# Patient Record
Sex: MALE | Race: WHITE
[De-identification: names, ages, dates, MRNs, and addresses within clinical notes are randomized per-mention and may not be internally consistent; named-entity substitution may affect disease eponyms.]

---

## 2022-03-25 ENCOUNTER — HOSPITAL ENCOUNTER (OUTPATIENT)
Dept: HOSPITAL 22 - ER | Age: 57
Setting detail: OBSERVATION
Discharge: HOME | End: 2022-03-25
Payer: COMMERCIAL

## 2022-03-25 VITALS
DIASTOLIC BLOOD PRESSURE: 99 MMHG | HEART RATE: 86 BPM | RESPIRATION RATE: 16 BRPM | OXYGEN SATURATION: 98 % | TEMPERATURE: 98.3 F | SYSTOLIC BLOOD PRESSURE: 134 MMHG

## 2022-03-25 VITALS
SYSTOLIC BLOOD PRESSURE: 123 MMHG | RESPIRATION RATE: 18 BRPM | OXYGEN SATURATION: 97 % | DIASTOLIC BLOOD PRESSURE: 91 MMHG | HEART RATE: 88 BPM

## 2022-03-25 VITALS
RESPIRATION RATE: 18 BRPM | SYSTOLIC BLOOD PRESSURE: 120 MMHG | DIASTOLIC BLOOD PRESSURE: 82 MMHG | HEART RATE: 110 BPM | OXYGEN SATURATION: 94 %

## 2022-03-25 VITALS
OXYGEN SATURATION: 95 % | SYSTOLIC BLOOD PRESSURE: 121 MMHG | RESPIRATION RATE: 18 BRPM | DIASTOLIC BLOOD PRESSURE: 86 MMHG | HEART RATE: 86 BPM

## 2022-03-25 VITALS
RESPIRATION RATE: 18 BRPM | HEART RATE: 95 BPM | DIASTOLIC BLOOD PRESSURE: 84 MMHG | OXYGEN SATURATION: 95 % | SYSTOLIC BLOOD PRESSURE: 142 MMHG

## 2022-03-25 VITALS
DIASTOLIC BLOOD PRESSURE: 80 MMHG | RESPIRATION RATE: 18 BRPM | SYSTOLIC BLOOD PRESSURE: 111 MMHG | HEART RATE: 85 BPM | OXYGEN SATURATION: 96 %

## 2022-03-25 VITALS
OXYGEN SATURATION: 92 % | HEART RATE: 103 BPM | SYSTOLIC BLOOD PRESSURE: 137 MMHG | DIASTOLIC BLOOD PRESSURE: 87 MMHG | RESPIRATION RATE: 18 BRPM

## 2022-03-25 VITALS
HEART RATE: 86 BPM | RESPIRATION RATE: 16 BRPM | OXYGEN SATURATION: 98 % | BODY MASS INDEX: 57.3 KG/M2 | DIASTOLIC BLOOD PRESSURE: 99 MMHG | SYSTOLIC BLOOD PRESSURE: 134 MMHG | BODY MASS INDEX: 53.8 KG/M2 | TEMPERATURE: 98.24 F

## 2022-03-25 VITALS
RESPIRATION RATE: 18 BRPM | HEART RATE: 92 BPM | SYSTOLIC BLOOD PRESSURE: 130 MMHG | OXYGEN SATURATION: 96 % | DIASTOLIC BLOOD PRESSURE: 86 MMHG

## 2022-03-25 VITALS
OXYGEN SATURATION: 95 % | HEART RATE: 88 BPM | RESPIRATION RATE: 16 BRPM | SYSTOLIC BLOOD PRESSURE: 128 MMHG | DIASTOLIC BLOOD PRESSURE: 81 MMHG

## 2022-03-25 VITALS
SYSTOLIC BLOOD PRESSURE: 129 MMHG | OXYGEN SATURATION: 95 % | RESPIRATION RATE: 16 BRPM | HEART RATE: 93 BPM | DIASTOLIC BLOOD PRESSURE: 84 MMHG

## 2022-03-25 VITALS
OXYGEN SATURATION: 97 % | SYSTOLIC BLOOD PRESSURE: 125 MMHG | DIASTOLIC BLOOD PRESSURE: 85 MMHG | HEART RATE: 86 BPM | RESPIRATION RATE: 16 BRPM

## 2022-03-25 VITALS — HEART RATE: 90 BPM

## 2022-03-25 VITALS
SYSTOLIC BLOOD PRESSURE: 124 MMHG | HEART RATE: 96 BPM | RESPIRATION RATE: 18 BRPM | DIASTOLIC BLOOD PRESSURE: 80 MMHG | OXYGEN SATURATION: 95 %

## 2022-03-25 DIAGNOSIS — I25.110: Primary | ICD-10-CM

## 2022-03-25 DIAGNOSIS — E78.5: ICD-10-CM

## 2022-03-25 DIAGNOSIS — Z79.4: ICD-10-CM

## 2022-03-25 DIAGNOSIS — I10: ICD-10-CM

## 2022-03-25 DIAGNOSIS — Z20.822: ICD-10-CM

## 2022-03-25 DIAGNOSIS — R07.9: ICD-10-CM

## 2022-03-25 DIAGNOSIS — R94.31: ICD-10-CM

## 2022-03-25 DIAGNOSIS — Z79.899: ICD-10-CM

## 2022-03-25 DIAGNOSIS — E11.9: ICD-10-CM

## 2022-03-25 LAB
ANION GAP SERPL CALC-SCNC: 10.9 MEQ/L (ref 5–15)
BUN SERPL-MCNC: 14 MG/DL (ref 9–20)
CALCIUM SPEC-MCNC: 9.7 MG/DL (ref 8.4–10.2)
CHLORIDE SPEC-SCNC: 102 MMOL/L (ref 98–107)
CO2 SERPL-SCNC: 28 MMOL/L (ref 22–30)
CREAT BLD-SCNC: 0.8 MG/DL (ref 0.66–1.25)
CREATININE CLEARANCE ESTIMATED: 102 ML/MIN (ref 50–200)
ESTIMATED GLOMERULAR FILT RATE: 101 ML/MIN (ref 60–?)
GFR (AFRICAN AMERICAN): 122 ML/MIN (ref 60–?)
GLUCOSE: 97 MG/DL (ref 74–100)
HCT VFR BLD CALC: 44.9 % (ref 42–52)
HGB BLD-MCNC: 14.2 G/DL (ref 14.1–18)
MCHC RBC-ENTMCNC: 31.5 G/DL (ref 31.8–35.4)
MCV RBC: 82 FL (ref 80–94)
MEAN CORPUSCULAR HEMOGLOBIN: 25.9 PG (ref 27–31.2)
PLATELET # BLD: 270 K/MM3 (ref 142–424)
POTASSIUM: 3.9 MMOL/L (ref 3.5–5.1)
RBC # BLD AUTO: 5.48 M/MM3 (ref 4.6–6.2)
SODIUM SPEC-SCNC: 137 MMOL/L (ref 136–145)
TROPONIN I: < 0.01 NG/ML (ref 0–0.03)
WBC # BLD AUTO: 5.1 K/MM3 (ref 4.8–10.8)

## 2022-03-25 PROCEDURE — 71275 CT ANGIOGRAPHY CHEST: CPT

## 2022-03-25 PROCEDURE — C1725 CATH, TRANSLUMIN NON-LASER: HCPCS

## 2022-03-25 PROCEDURE — C1769 GUIDE WIRE: HCPCS

## 2022-03-25 PROCEDURE — 93458 L HRT ARTERY/VENTRICLE ANGIO: CPT

## 2022-03-25 PROCEDURE — C9803 HOPD COVID-19 SPEC COLLECT: HCPCS

## 2022-03-25 PROCEDURE — C1760 CLOSURE DEV, VASC: HCPCS

## 2022-03-25 PROCEDURE — 99285 EMERGENCY DEPT VISIT HI MDM: CPT

## 2022-03-25 PROCEDURE — G0378 HOSPITAL OBSERVATION PER HR: HCPCS

## 2022-03-25 PROCEDURE — U0003 INFECTIOUS AGENT DETECTION BY NUCLEIC ACID (DNA OR RNA); SEVERE ACUTE RESPIRATORY SYNDROME CORONAVIRUS 2 (SARS-COV-2) (CORONAVIRUS DISEASE [COVID-19]), AMPLIFIED PROBE TECHNIQUE, MAKING USE OF HIGH THROUGHPUT TECHNOLOGIES AS DESCRIBED BY CMS-2020-01-R: HCPCS

## 2022-03-25 PROCEDURE — 80048 BASIC METABOLIC PNL TOTAL CA: CPT

## 2022-03-25 PROCEDURE — U0005 INFEC AGEN DETEC AMPLI PROBE: HCPCS

## 2022-03-25 PROCEDURE — 99152 MOD SED SAME PHYS/QHP 5/>YRS: CPT

## 2022-03-25 PROCEDURE — 85025 COMPLETE CBC W/AUTO DIFF WBC: CPT

## 2022-03-25 PROCEDURE — 84484 ASSAY OF TROPONIN QUANT: CPT

## 2022-03-25 NOTE — HMH.PHAINT
I verified the patients home medication list utilizing list from pharmacy and information provided by the patient.

## 2022-03-28 NOTE — CARE MANAGER
Called and spoke with Dr. Olivera about post discharge status. Patient states that he is doing well, back to work with no issues. He also stated that he was very pleased with the care he received at this facility. No known issues at this time.

## 2022-05-26 ENCOUNTER — DOCUMENTATION (OUTPATIENT)
Dept: BARIATRICS/WEIGHT MGMT | Facility: CLINIC | Age: 57
End: 2022-05-26

## 2022-05-26 ENCOUNTER — OFFICE VISIT (OUTPATIENT)
Dept: BEHAVIORAL HEALTH | Facility: CLINIC | Age: 57
End: 2022-05-26

## 2022-05-26 ENCOUNTER — OFFICE VISIT (OUTPATIENT)
Dept: BARIATRICS/WEIGHT MGMT | Facility: CLINIC | Age: 57
End: 2022-05-26

## 2022-05-26 VITALS
BODY MASS INDEX: 46.65 KG/M2 | TEMPERATURE: 97.9 F | WEIGHT: 315 LBS | DIASTOLIC BLOOD PRESSURE: 86 MMHG | HEIGHT: 69 IN | SYSTOLIC BLOOD PRESSURE: 130 MMHG | HEART RATE: 90 BPM | OXYGEN SATURATION: 98 %

## 2022-05-26 DIAGNOSIS — I10 ESSENTIAL HYPERTENSION: ICD-10-CM

## 2022-05-26 DIAGNOSIS — Z71.89 ENCOUNTER FOR PSYCHOLOGICAL ASSESSMENT PRIOR TO BARIATRIC SURGERY: ICD-10-CM

## 2022-05-26 DIAGNOSIS — Z79.4 TYPE 2 DIABETES MELLITUS WITHOUT COMPLICATION, WITH LONG-TERM CURRENT USE OF INSULIN: ICD-10-CM

## 2022-05-26 DIAGNOSIS — E11.9 TYPE 2 DIABETES MELLITUS WITHOUT COMPLICATION, WITH LONG-TERM CURRENT USE OF INSULIN: ICD-10-CM

## 2022-05-26 DIAGNOSIS — Z56.6 STRESSFUL WORKPLACE: ICD-10-CM

## 2022-05-26 DIAGNOSIS — J45.909 ASTHMA, UNSPECIFIED ASTHMA SEVERITY, UNSPECIFIED WHETHER COMPLICATED, UNSPECIFIED WHETHER PERSISTENT: ICD-10-CM

## 2022-05-26 DIAGNOSIS — K44.9 HIATAL HERNIA: ICD-10-CM

## 2022-05-26 DIAGNOSIS — E78.49 OTHER HYPERLIPIDEMIA: ICD-10-CM

## 2022-05-26 DIAGNOSIS — R12 HEARTBURN: ICD-10-CM

## 2022-05-26 DIAGNOSIS — F43.21 SITUATIONAL DEPRESSION: ICD-10-CM

## 2022-05-26 DIAGNOSIS — E66.01 MORBID OBESITY: Primary | ICD-10-CM

## 2022-05-26 PROBLEM — M54.9 BACK PAIN: Status: ACTIVE | Noted: 2022-05-26

## 2022-05-26 PROBLEM — H81.10 BENIGN PAROXYSMAL POSITIONAL VERTIGO: Status: ACTIVE | Noted: 2022-05-26

## 2022-05-26 PROBLEM — G47.33 OSA (OBSTRUCTIVE SLEEP APNEA): Status: ACTIVE | Noted: 2022-05-26

## 2022-05-26 PROCEDURE — 90791 PSYCH DIAGNOSTIC EVALUATION: CPT | Performed by: PSYCHOLOGIST

## 2022-05-26 PROCEDURE — 99204 OFFICE O/P NEW MOD 45 MIN: CPT | Performed by: PHYSICIAN ASSISTANT

## 2022-05-26 RX ORDER — AMLODIPINE BESYLATE 10 MG/1
10 TABLET ORAL
COMMUNITY
Start: 2022-03-15 | End: 2022-10-11 | Stop reason: HOSPADM

## 2022-05-26 RX ORDER — FLASH GLUCOSE SENSOR
KIT MISCELLANEOUS
COMMUNITY
Start: 2022-04-13

## 2022-05-26 RX ORDER — LOSARTAN POTASSIUM AND HYDROCHLOROTHIAZIDE 12.5; 1 MG/1; MG/1
TABLET ORAL
COMMUNITY
End: 2022-10-11 | Stop reason: HOSPADM

## 2022-05-26 RX ORDER — CARVEDILOL 12.5 MG/1
12.5 TABLET ORAL
COMMUNITY
Start: 2022-03-04 | End: 2022-10-11 | Stop reason: HOSPADM

## 2022-05-26 RX ORDER — DULAGLUTIDE 1.5 MG/.5ML
INJECTION, SOLUTION SUBCUTANEOUS
COMMUNITY
Start: 2022-03-30 | End: 2022-10-11 | Stop reason: HOSPADM

## 2022-05-26 RX ORDER — FLURBIPROFEN SODIUM 0.3 MG/ML
SOLUTION/ DROPS OPHTHALMIC
COMMUNITY
Start: 2022-03-04

## 2022-05-26 RX ORDER — ROSUVASTATIN CALCIUM 10 MG/1
20 TABLET, COATED ORAL
COMMUNITY
Start: 2022-04-05

## 2022-05-26 RX ORDER — INSULIN GLARGINE-YFGN 100 [IU]/ML
INJECTION, SOLUTION SUBCUTANEOUS
COMMUNITY
Start: 2022-03-30 | End: 2022-10-11 | Stop reason: HOSPADM

## 2022-05-26 RX ORDER — INSULIN GLARGINE 100 [IU]/ML
INJECTION, SOLUTION SUBCUTANEOUS EVERY 12 HOURS SCHEDULED
COMMUNITY

## 2022-05-26 RX ORDER — OMEPRAZOLE 20 MG/1
20 TABLET, DELAYED RELEASE ORAL
COMMUNITY
End: 2022-10-11 | Stop reason: HOSPADM

## 2022-05-26 SDOH — HEALTH STABILITY - MENTAL HEALTH: OTHER PHYSICAL AND MENTAL STRAIN RELATED TO WORK: Z56.6

## 2022-05-26 NOTE — PROGRESS NOTES
"[facility] BARIATRIC SURGERY  [address]  [address]  [facility] [address]  [phone]      Patient  Name:  Von Rodríguez  :  1965      Date of Visit: 2022      Chief Complaint:  weight gain; unable to maintain weight loss      History of Present Illness:  Von Rodríguez is a 56 y.o. male who presents today for evaluation, education and consultation regarding metabolic and bariatric surgery with Dr. Connelly.     Von has been overweight for at least 10 years, has been 35 pounds or more overweight for at least 6+ years, has been 100 pounds or more overweight for 6 or more years and started dieting at age 35.  Previous diet attempts include: Body for Life/Bill Martinez, High Protein, Low Carbohydrate, Rubio's Diet, [place] and Fasting; Weight Watchers; Wellbutrin and Ionamin/Adipex.  The most weight Von lost was 80-90 pounds following low carb diet and exercising, but was unable to maintain that weight loss.  His maximum lifetime weight is 380 pounds.  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     Patient was previously undergoing bariatric process at [facility].  He was scheduled for Zonia-en-Y gastric bypass August 3, 2021 with Dr. Jenkins.  The gastric bypass was aborted due to \"high trocar torque \".    GI: He has a history of chronic, episodic heartburn that is treated with daily omeprazole.  He underwent an EGD in May 2021 which did reveal a moderate sized hiatal hernia.  No prior history of H. pylori infection.  He had a laparoscopic cholecystectomy in  for gallstones.    Cardiac/Pulm: Reported history of asthma-not currently using any inhalers.  He also has a history of hypertension.  He underwent LHC 2022 w/ Dr. Berkowitz- no CAD.     Endo: He is a type II diabetic diagnosed in 2018.  He has been on insulin since time of diagnosis.  Most recent A1c 6.2.     Complete history has been obtained and discussed today, as pertinent to " metabolic/ bariatric surgery.     Past Medical History:   Diagnosis Date   • Asthma     childhood; does not have inhaler   • Back pain     chronic; no meds; no steroids   • Benign paroxysmal positional vertigo    • Heartburn     EGD May 2021; no hx of h pylori; symptoms well controlled on omeprazole   • Hypertension    • JAD (obstructive sleep apnea)     s/p uvulaplasty   • Other hyperlipidemia    • S/P cholecystectomy 1993    lap; gallstones   • Type 2 diabetes mellitus without complication, with long-term current use of insulin (HCC)     dx in 2018; on insulin since diagnosis; last A1c 6.2     Past Surgical History:   Procedure Laterality Date   • DIAGNOSTIC LAPAROSCOPY      aborted RNY gastric bypass due to high trocar torque. Dr. Jenkins [facility]   • LAPAROSCOPIC CHOLECYSTECTOMY  1993    gallstones   • TONSILLECTOMY AND ADENOIDECTOMY  2007       Allergies   Allergen Reactions   • Codeine Rash       Current Outpatient Medications:   •  amLODIPine (NORVASC) 10 MG tablet, 10 mg., Disp: , Rfl:   •  B-D UF III MINI PEN NEEDLES 31G X 5 MM misc, , Disp: , Rfl:   •  carvedilol (COREG) 12.5 MG tablet, 12.5 mg., Disp: , Rfl:   •  Continuous Blood Gluc Sensor (FreeStyle Matt 14 Day Sensor) misc, , Disp: , Rfl:   •  Insulin Glargine (Lantus SoloStar) 100 UNIT/ML injection pen, Every 12 (Twelve) Hours., Disp: , Rfl:   •  losartan-hydrochlorothiazide (HYZAAR) 100-12.5 MG per tablet, losartan 100 mg-hydrochlorothiazide 12.5 mg tablet  Take 1 tablet every day by oral route., Disp: , Rfl:   •  metFORMIN (GLUCOPHAGE) 1000 MG tablet, 1,000 mg 2 (Two) Times a Day., Disp: , Rfl:   •  omeprazole OTC (PriLOSEC OTC) 20 MG EC tablet, 20 mg., Disp: , Rfl:   •  rosuvastatin (CRESTOR) 10 MG tablet, 20 mg., Disp: , Rfl:   •  Semglee, yfgn, 100 UNIT/ML solution pen-injector, , Disp: , Rfl:   •  Trulicity 1.5 MG/0.5ML solution pen-injector, , Disp: , Rfl:   •  VITAMIN D PO, Take  by mouth., Disp: , Rfl:   •  Paxlovid 20 x 150 MG & 10 x  100MG tablet therapy pack tablet, TAKE 2 NIRMATRELVIR TABLETS AND 1 RITONAVIR TABLET TOGETHER BY MOUTH TWICE DAILY FOR 5 DAYS, Disp: , Rfl:     Social History     Socioeconomic History   • Marital status:    Tobacco Use   • Smoking status: Never Smoker   • Smokeless tobacco: Never Used   Vaping Use   • Vaping Use: Never used   Substance and Sexual Activity   • Alcohol use: Not Currently   • Drug use: Never     Social History     Social History Narrative    Lives in [place], [place]. 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 .        Family History   Problem Relation Age of Onset   • Hypertension Mother    • Dementia Mother    • Heart disease Father    • Hypertension Father    • Stroke Father    • Hypertension Brother    • Diabetes Brother    • No Known Problems Brother        Review of Systems:  Constitutional:  reports fatigue, weight gain and denies fevers, chills.  HEENT:  denies headache, ear pain or loss of hearing, blurred or double vision, nasal discharge or sore throat.  Cardiovascular:  reports HTN, HLD and denies CAD, Atrial Fib, hx heart disease, heart murmur, hx MI, chest pain, palpitations, edema, hx DVT.  Respiratory:  reports sleep apnea, asthma and denies dyspnea on exertion, shortness of breath , cough , wheezing, COPD, hx PE.  Gastrointestinal:  reports heartburn, gallbladder issues and denies dysphagia, nausea, vomiting, abdominal pain, IBS, diarrhea, constipation, melena, blood in stool, liver disease, hx pancreatitis.  Genitourinary:  reports none and denies history of  frequent UTI, incontinence, hematuria, dysuria, polyuria, polydipsia, renal insufficiency, renal failure.    Musculoskeletal:  reports back pain and denies joint pain, fibromyalgia, arthritis and autoimmune disease.  Neurological:  reports dizziness and denies headaches, migraines, numbness /tingling, confusion, seizure, stroke.  Psychiatric:  reports none and denies depressed mood, hx depression, feeling anxious, hx  anxiety, bipolar disorder, suicidal ideation, hx suicide attempt, hx self injury, hx substance abuse, eating disorder.  Endocrine:  reports diabetes and denies glucose intolerance, thyroid disease, gout.  Hematologic:  reports none and denies bruising, bleeding disorder, hx anemia, hx blood transfusion.  Skin:  reports none and denies rashes, hx MRSA.      COVID-19 Questionnaire:    1.  Have you previously been tested for COVID-19?    []  No  [x]  Yes  2.  Were you ever positive for COVID-19?    [x]  No  []  Yes  3.  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    []  No  [x]  Yes  4.  Are you symptomatic for COVID-19 as defined by the CDC (fever, cough)?  If so, when did symptoms begin?    [x]  No  []  Yes, symptoms began **  5.  Have you been hospitalized for COVID-19?  If so, were you in the ICU?  [x]  No  []  Yes, but not in the ICU  []  Yes, and I was in the ICU  6.  Are you a resident in a congregate (group care setting?)    [x]  No  []  Yes        Physical Exam:  Vital Signs:  Weight: (!) 171 kg (376 lb)   Body mass index is 56.33 kg/m².  Temp: 97.9 °F (36.6 °C)   Heart Rate: 90   BP: 130/86     Physical Exam  Constitutional:       General: He is not in acute distress.     Appearance: He is obese.   HENT:      Head: Normocephalic and atraumatic.   Cardiovascular:      Rate and Rhythm: Normal rate and regular rhythm.      Heart sounds: Normal heart sounds.   Pulmonary:      Effort: Pulmonary effort is normal.      Breath sounds: Normal breath sounds.   Abdominal:      General: Bowel sounds are normal. There is no distension.      Palpations: Abdomen is soft. There is no mass.      Tenderness: There is no abdominal tenderness.      Comments: Old lap scars   Musculoskeletal:      Cervical back: Normal range of motion and neck supple.   Skin:     General: Skin is warm and dry.   Neurological:      General: No focal deficit present.      Mental Status: He is alert and oriented to person, place, and time.    Psychiatric:         Mood and Affect: Mood normal.         Behavior: Behavior normal.         Patient Active Problem List   Diagnosis   • Asthma   • Hypertension   • Other hyperlipidemia   • JAD (obstructive sleep apnea)   • Heartburn   • S/P cholecystectomy   • Back pain   • Benign paroxysmal positional vertigo   • Type 2 diabetes mellitus without complication, with long-term current use of insulin (HCC)   • Hiatal hernia       Assessment:  56 y.o. male with medically complicated obesity pursuing sleeve gastrectomy.    Metabolic & Bariatric Surgery is deemed medically necessary given the following: BMI has not been calculated during today's encounter.         Plan:  Further evaluation will include: CBC, CMP, Lipids, TSH, HgA1C, H.Pylori serum, EKG, CXR, Pulmonary Function Testing, Gastric Emptying Study and EGD.    EGD March 2021-Dr. Debra Tucker:    Findings: Moderate hiatal hernia.    Additional clearances needed prior to surgery will include: Cardiology.     Patient understands that bariatric surgery is not cosmetic surgery but rather a tool to help make a lifelong commitment to lifestyle changes including diet, exercise and behavior modifications.  The patient has been educated today on those expected postoperative lifestyle changes.  Psychological and Nutritional consultations will be completed prior to surgery.  Instructions on how to access [employer] (an internet based site w/ educational surgical videos) were given to the patient.  Recommended perioperative vitamin supplementation was reviewed.  The importance of avoiding ASA/ NSAIDS/ steroids/ tobacco/nicotine/ hormones/ immunomodulators perioperatively was discussed in detail.  All questions/concerns have been addressed.      Further input to follow pending the above.           FABIANO Lyn      ADDENDUM:     h pylori Iga antibody test positive. Have sent in rx for Prevpac. Patient had EGD 5/2021. Discussed with Dr. Connelly and will not plan to repeat  EGD. Patient very adamant he can not hold his PPI for two weeks in order to retest. Has attempted in past with Pepcid as a substitute and was only able to hold for 3 days.

## 2022-05-26 NOTE — PROGRESS NOTES
PROGRESS NOTE    Data:    Sheriff Rodríguez is a 56 y.o. male who met with the undersigned for a scheduled psychological evaluation from 9:00 - 9:45am.      Clinical Maneuvering/Intervention:      Chief complaint and history of presenting illness/Problems: struggling with obesity for several years. Despite trying different weight loss plans and diets, the pt reported being unsuccessful in losing weight. A psychological evaluation was conducted in order to assess past and current level of functioning. Areas assessed included, but were not limited to: perception of social support, perception of ability to face and deal with challenges in life (positive functioning), anxiety symptoms, depressive symptoms, perspective on beliefs/belief system, coping skills for stress, intelligence level, addiction issues, etc. Therapeutic rapport was established. Interventions conducted today were geared towards assessing the pt's readiness for weight loss surgery and identifying and psychological contraindications for undergoing such a major life change. Social support was deemed strong (specific to weight loss surgery/weight loss in this manner and in a general sense): significant other, daughters, friends, and co-workers. Current psychological struggles were described as low, but included: depression situational to being overweight and irritability at work. 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 The pt endorsed having characteristics of readiness to undergo major life changes inherent in the journey of weight loss surgery. The pt could speak to the detailed process of becoming ready mentally for this major  life change, having 'suffered' enough, and how the pt has started incorporating healthier foods into his diet. The pt expressed gratitude for today's visit.     Past Family and Social History:      History of family mental health problems: brother (schizophrenia)     Psychosocial history: treatment of psychiatric care in the past (N/A), alcohol/substance abuse treatment in the past (N/A) , alcohol/substance abuse problems (N/A), inpatient psychiatric care (N/A).    Mental Status Exam (MSE):  Hygiene:  good  Dress: normal  Attitude:  cooperative and proactive  Motor Activity: normal  Speech: normal  Mood:   determined  Affect:  congruent  Thought Processes: normal  Thought Content:  normal  Suicidal Thoughts:  not endorsed  Homicidal Thoughts:  not endorsed  Crisis Safety Plan: not needed   Hallucinations:  none      Patient's Support Network Includes:  family, friends      Progress toward goal: there is evidence to suggest that he is taking measures to improve the quality of his life including seeking weight loss surgery.       Functional Status: moderate to high      Prognosis: good with weight loss surgery    Evaluation, Diagnoses, and Ability/Capacity to Respond to Treatment:      The pt presented to be struggling with depression situational to obesity (BMI = 56.3, morbid obesity) and irritability at work/work stress. Results of MSE demonstrated a functional status of moderate to high. Strengths: belief in self that he will be successful with weight loss surgery, etc (see detailed list of coping skills above). Needed for growth (CPT code requirement for Weaknesses): weight loss.       From a psychological standpoint, the pt presents as a good candidate for bariatric surgery. He is motivated for the surgery, has showed readiness for the lifestyle change in terms of starting to adjust his eating habits, and seems to have appropriate expectations of how to prepare and how to live after surgery in order to lose  weight successfully.    Treatment Plan:      Short term goals: Start improving his health by following up with his bariatric surgeon in order to receive weight loss surgery as soon as feasible/appropriate and demonstrate success with compliance to adhering to the recommended diet. Long term goals: reach a healthy weight and remittance of depression/work stress via taking control of his health.     Camille Rivas, PhD, LP

## 2022-05-26 NOTE — PROGRESS NOTES
"Weight Loss Surgery  Presurgical Nutrition Assessment     Von Rodríguez  05/26/2022  [id number]  [id number]  1965  male    Surgery desired: Sleeve Gastrectomy    Height: 174 cm (68.4\")  Weight: 171 kg (376 #)  BMI: 56.33    Past Medical History:   Diagnosis Date   • Asthma     childhood; does not have inhaler   • Back pain     chronic; no meds; no steroids   • Benign paroxysmal positional vertigo    • Heartburn     EGD May 2021; no hx of h pylori; symptoms well controlled on omeprazole   • Hypertension    • JAD (obstructive sleep apnea)     s/p uvulaplasty   • Other hyperlipidemia    • S/P cholecystectomy 1993    lap; gallstones   • Type 2 diabetes mellitus without complication, with long-term current use of insulin ([facility])     dx in 2018; on insulin since diagnosis; last A1c 6.2     Past Surgical History:   Procedure Laterality Date   • DIAGNOSTIC LAPAROSCOPY      aborted RNY gastric bypass due to high trocar torque. Dr. Gregory Pradhan [facility]   • LAPAROSCOPIC CHOLECYSTECTOMY  1993    gallstones   • TONSILLECTOMY AND ADENOIDECTOMY  2007     Allergies   Allergen Reactions   • Codeine Rash       Current Outpatient Medications:   •  amLODIPine (NORVASC) 10 MG tablet, 10 mg., Disp: , Rfl:   •  B-D UF III MINI PEN NEEDLES 31G X 5 MM misc, , Disp: , Rfl:   •  carvedilol (COREG) 12.5 MG tablet, 12.5 mg., Disp: , Rfl:   •  Continuous Blood Gluc Sensor (FreeStyle Matt 14 Day Sensor) misc, , Disp: , Rfl:   •  Insulin Glargine (Lantus SoloStar) 100 UNIT/ML injection pen, Every 12 (Twelve) Hours., Disp: , Rfl:   •  losartan-hydrochlorothiazide (HYZAAR) 100-12.5 MG per tablet, losartan 100 mg-hydrochlorothiazide 12.5 mg tablet  Take 1 tablet every day by oral route., Disp: , Rfl:   •  metFORMIN (GLUCOPHAGE) 1000 MG tablet, 1,000 mg 2 (Two) Times a Day., Disp: , Rfl:   •  omeprazole OTC (PriLOSEC OTC) 20 MG EC tablet, 20 mg., Disp: , Rfl:   •  Paxlovid 20 x 150 MG & 10 x 100MG tablet therapy pack tablet, TAKE 2 NIRMATRELVIR " TABLETS AND 1 RITONAVIR TABLET TOGETHER BY MOUTH TWICE DAILY FOR 5 DAYS, Disp: , Rfl:   •  rosuvastatin (CRESTOR) 10 MG tablet, 20 mg., Disp: , Rfl:   •  Semglee, yfgn, 100 UNIT/ML solution pen-injector, , Disp: , Rfl:   •  Trulicity 1.5 MG/0.5ML solution pen-injector, , Disp: , Rfl:   •  VITAMIN D PO, Take  by mouth., Disp: , Rfl:       Nutrition Assessment    Estimated energy needs:  2530 kcal    Estimated calories for weight loss:  1800 kcal    IBW (Pounds):  165 #        Excess body weight (Pounds):  210 #       Nutrition Recall  24 Hour recall: (B) (L) (D) -  Reviewed and discussed with patient, who states he is well aware that the convenience food on which he is currently relying is not conducive at all to reaching his weight loss goals. On the other hand, because of previous programs in which he has been involved (SANDY, et al) he capably articulates the principles of healthy lower carb lower total calorie eating. With eating episodes (B-L-D & snacks) not designated on food history form, intake was recorded as follows, in this order:  2 salami & cheese sandwiches on low carb bread, an orange, strawberries, 1 chicken strip, 2 pieces of cake, 1 diet Mt Dew, 2 Zero sugar Gatorade, 6 chicken nuggets, 6 cheese sticks, burrito, egg McMuffin.  Protein needs are approached.  Diet is high in processed foods, carbohydrate ones especially. Meals are fast convenience food primarily. Diet is lacking a variety of fruits and vegetables. Patient will focus on ingesting adequate protein for successful weight loss in 3 regular balanced meals + 2 to 3 high protein snacks per day.  He will wean himself off of all carbonated beverages.         Exercise  never      Education    Provided information packet re:  Sleeve Gastrectomy  1. Reviewed guidelines for higher protein, limited carbohydrate diet to promote weight loss.  Encouraged patient to incorporate these principles of healthy eating from now until approximately 2 weeks prior  to bariatric surgery date, when an even lower carbohydrate “liver-shrinking” regimen will be followed. (Information sheet re pre-op diet given).  Explained that after recovery from surgery this diet will again be followed to ensure further loss and for weight maintenance.    2. Encouraged patient to choose an acceptable protein supplement powder or shake for post-surgery liquid diet.  Provided product guidelines and examples.    3. Explained importance of goal setting to help in changing eating behaviors that are not conducive to weight loss.  Targeted several on a worksheet which also included spaces for patient to work on issues specific to them.  4. Provided follow-up options for support, including contact information for dietitians here, if desired.  Web-based support information and apps for smart phones and computers given.  Noted that monthly support group is offered at this clinic, and that support is associated with successful weight loss.    Recommend that team proceed with surgery and follow per protocol.      Nutrition Goals   Dietary Guidelines per information packet as described above  Protein goal:  grams per day   Carbohydrate goal:  100-140 grams per day  Eliminate soda, sweet tea, etc.     Exercise Goals  Continue current exercise routine   Add 15-30 minutes of activity per day as tolerated      Cris Hansen, KACEY  05/26/2022  10:48 EDT

## 2022-05-28 LAB
ALBUMIN SERPL-MCNC: 4.5 G/DL (ref 3.5–5.2)
ALBUMIN/GLOB SERPL: 1.4 G/DL
ALP SERPL-CCNC: 57 U/L (ref 39–117)
ALT SERPL-CCNC: 43 U/L (ref 1–41)
AST SERPL-CCNC: 28 U/L (ref 1–40)
BASOPHILS # BLD AUTO: 0.03 10*3/MM3 (ref 0–0.2)
BASOPHILS NFR BLD AUTO: 0.5 % (ref 0–1.5)
BILIRUB SERPL-MCNC: 0.4 MG/DL (ref 0–1.2)
BUN SERPL-MCNC: 17 MG/DL (ref 6–20)
BUN/CREAT SERPL: 18.9 (ref 7–25)
CALCIUM SERPL-MCNC: 10.5 MG/DL (ref 8.6–10.5)
CHLORIDE SERPL-SCNC: 101 MMOL/L (ref 98–107)
CHOLEST SERPL-MCNC: 127 MG/DL (ref 0–200)
CO2 SERPL-SCNC: 20.8 MMOL/L (ref 22–29)
CREAT SERPL-MCNC: 0.9 MG/DL (ref 0.76–1.27)
EGFRCR SERPLBLD CKD-EPI 2021: 101.5 ML/MIN/1.73
EOSINOPHIL # BLD AUTO: 0.33 10*3/MM3 (ref 0–0.4)
EOSINOPHIL NFR BLD AUTO: 5.6 % (ref 0.3–6.2)
ERYTHROCYTE [DISTWIDTH] IN BLOOD BY AUTOMATED COUNT: 14.2 % (ref 12.3–15.4)
GLOBULIN SER CALC-MCNC: 3.2 GM/DL
GLUCOSE SERPL-MCNC: 95 MG/DL (ref 65–99)
H PYLORI IGA SER-ACNC: 20.4 UNITS (ref 0–8.9)
H PYLORI IGG SER IA-ACNC: 0.29 INDEX VALUE (ref 0–0.79)
HBA1C MFR BLD: 6.1 % (ref 4.8–5.6)
HCT VFR BLD AUTO: 44 % (ref 37.5–51)
HDLC SERPL-MCNC: 45 MG/DL (ref 40–60)
HGB BLD-MCNC: 13.9 G/DL (ref 13–17.7)
IMM GRANULOCYTES # BLD AUTO: 0.02 10*3/MM3 (ref 0–0.05)
IMM GRANULOCYTES NFR BLD AUTO: 0.3 % (ref 0–0.5)
LDLC SERPL CALC-MCNC: 58 MG/DL (ref 0–100)
LYMPHOCYTES # BLD AUTO: 2.75 10*3/MM3 (ref 0.7–3.1)
LYMPHOCYTES NFR BLD AUTO: 46.8 % (ref 19.6–45.3)
MCH RBC QN AUTO: 25 PG (ref 26.6–33)
MCHC RBC AUTO-ENTMCNC: 31.6 G/DL (ref 31.5–35.7)
MCV RBC AUTO: 79.1 FL (ref 79–97)
MONOCYTES # BLD AUTO: 0.47 10*3/MM3 (ref 0.1–0.9)
MONOCYTES NFR BLD AUTO: 8 % (ref 5–12)
NEUTROPHILS # BLD AUTO: 2.28 10*3/MM3 (ref 1.7–7)
NEUTROPHILS NFR BLD AUTO: 38.8 % (ref 42.7–76)
NRBC BLD AUTO-RTO: 0 /100 WBC (ref 0–0.2)
PLATELET # BLD AUTO: 268 10*3/MM3 (ref 140–450)
POTASSIUM SERPL-SCNC: 4.8 MMOL/L (ref 3.5–5.2)
PROT SERPL-MCNC: 7.7 G/DL (ref 6–8.5)
RBC # BLD AUTO: 5.56 10*6/MM3 (ref 4.14–5.8)
SODIUM SERPL-SCNC: 141 MMOL/L (ref 136–145)
TRIGL SERPL-MCNC: 139 MG/DL (ref 0–150)
TSH SERPL DL<=0.005 MIU/L-ACNC: 1.74 UIU/ML (ref 0.27–4.2)
VLDLC SERPL CALC-MCNC: 24 MG/DL (ref 5–40)
WBC # BLD AUTO: 5.88 10*3/MM3 (ref 3.4–10.8)

## 2022-06-03 ENCOUNTER — CLINICAL SUPPORT NO REQUIREMENTS (OUTPATIENT)
Dept: PREADMISSION TESTING | Facility: HOSPITAL | Age: 57
End: 2022-06-03

## 2022-06-03 DIAGNOSIS — J45.909 ASTHMA, UNSPECIFIED ASTHMA SEVERITY, UNSPECIFIED WHETHER COMPLICATED, UNSPECIFIED WHETHER PERSISTENT: ICD-10-CM

## 2022-06-03 LAB — SARS-COV-2 RNA PNL SPEC NAA+PROBE: NOT DETECTED

## 2022-06-03 PROCEDURE — U0004 COV-19 TEST NON-CDC HGH THRU: HCPCS

## 2022-06-03 PROCEDURE — C9803 HOPD COVID-19 SPEC COLLECT: HCPCS

## 2022-06-06 ENCOUNTER — HOSPITAL ENCOUNTER (OUTPATIENT)
Dept: PULMONOLOGY | Facility: HOSPITAL | Age: 57
Discharge: HOME | End: 2022-06-06
Admitting: PHYSICIAN ASSISTANT

## 2022-06-06 DIAGNOSIS — J45.909 ASTHMA, UNSPECIFIED ASTHMA SEVERITY, UNSPECIFIED WHETHER COMPLICATED, UNSPECIFIED WHETHER PERSISTENT: ICD-10-CM

## 2022-06-06 PROCEDURE — 94727 GAS DIL/WSHOT DETER LNG VOL: CPT

## 2022-06-06 PROCEDURE — 94060 EVALUATION OF WHEEZING: CPT

## 2022-06-06 PROCEDURE — 94727 GAS DIL/WSHOT DETER LNG VOL: CPT | Performed by: INTERNAL MEDICINE

## 2022-06-06 PROCEDURE — 94060 EVALUATION OF WHEEZING: CPT | Performed by: INTERNAL MEDICINE

## 2022-06-06 PROCEDURE — 94729 DIFFUSING CAPACITY: CPT | Performed by: INTERNAL MEDICINE

## 2022-06-06 PROCEDURE — 94729 DIFFUSING CAPACITY: CPT

## 2022-06-06 RX ORDER — ALBUTEROL SULFATE 2.5 MG/3ML
2.5 SOLUTION RESPIRATORY (INHALATION) ONCE
Status: COMPLETED | OUTPATIENT
Start: 2022-06-06 | End: 2022-06-06

## 2022-06-06 RX ADMIN — ALBUTEROL SULFATE 2.5 MG: 2.5 SOLUTION RESPIRATORY (INHALATION) at 09:24

## 2022-06-08 RX ORDER — OMEPRAZOLE 20 MG/1
20 CAPSULE, DELAYED RELEASE ORAL 2 TIMES DAILY
Qty: 28 CAPSULE | Refills: 0 | Status: SHIPPED | OUTPATIENT
Start: 2022-06-08 | End: 2022-06-22

## 2022-06-08 RX ORDER — CLARITHROMYCIN 500 MG/1
500 TABLET, COATED ORAL 2 TIMES DAILY
Qty: 28 TABLET | Refills: 0 | Status: SHIPPED | OUTPATIENT
Start: 2022-06-08 | End: 2022-06-22

## 2022-06-08 RX ORDER — AMOXICILLIN 500 MG/1
1000 CAPSULE ORAL 2 TIMES DAILY
Qty: 56 CAPSULE | Refills: 0 | Status: SHIPPED | OUTPATIENT
Start: 2022-06-08 | End: 2022-06-22

## 2022-06-13 ENCOUNTER — HOSPITAL ENCOUNTER (OUTPATIENT)
Dept: NUCLEAR MEDICINE | Facility: HOSPITAL | Age: 57
Discharge: HOME | End: 2022-06-13

## 2022-06-13 DIAGNOSIS — E11.9 TYPE 2 DIABETES MELLITUS WITHOUT COMPLICATION, WITH LONG-TERM CURRENT USE OF INSULIN: ICD-10-CM

## 2022-06-13 DIAGNOSIS — Z79.4 TYPE 2 DIABETES MELLITUS WITHOUT COMPLICATION, WITH LONG-TERM CURRENT USE OF INSULIN: ICD-10-CM

## 2022-06-13 PROCEDURE — 78264 GASTRIC EMPTYING IMG STUDY: CPT

## 2022-06-13 PROCEDURE — A9541 TC99M SULFUR COLLOID: HCPCS | Performed by: PHYSICIAN ASSISTANT

## 2022-06-13 PROCEDURE — 0 TECHNETIUM SULFUR COLLOID: Performed by: PHYSICIAN ASSISTANT

## 2022-06-13 RX ADMIN — TECHNETIUM TC 99M SULFUR COLLOID 1 DOSE: KIT at 13:25

## 2022-07-25 DIAGNOSIS — R53.83 FATIGUE, UNSPECIFIED TYPE: ICD-10-CM

## 2022-07-25 DIAGNOSIS — R06.00 DYSPNEA, UNSPECIFIED TYPE: Primary | ICD-10-CM

## 2022-08-15 ENCOUNTER — LAB (OUTPATIENT)
Dept: LAB | Facility: HOSPITAL | Age: 57
End: 2022-08-15

## 2022-08-15 ENCOUNTER — HOSPITAL ENCOUNTER (OUTPATIENT)
Dept: GENERAL RADIOLOGY | Facility: HOSPITAL | Age: 57
Discharge: HOME | End: 2022-08-15

## 2022-08-15 DIAGNOSIS — R06.00 DYSPNEA, UNSPECIFIED TYPE: ICD-10-CM

## 2022-08-15 DIAGNOSIS — Z01.818 PRE-OP TESTING: Primary | ICD-10-CM

## 2022-08-15 DIAGNOSIS — R53.83 FATIGUE, UNSPECIFIED TYPE: ICD-10-CM

## 2022-08-15 LAB
ALBUMIN SERPL-MCNC: 4.1 G/DL (ref 3.5–5.2)
ALBUMIN/GLOB SERPL: 1.6 G/DL
ALP SERPL-CCNC: 50 U/L (ref 39–117)
ALT SERPL W P-5'-P-CCNC: 38 U/L (ref 1–41)
ANION GAP SERPL CALCULATED.3IONS-SCNC: 11.3 MMOL/L (ref 5–15)
AST SERPL-CCNC: 25 U/L (ref 1–40)
BILIRUB SERPL-MCNC: 0.3 MG/DL (ref 0–1.2)
BUN SERPL-MCNC: 19 MG/DL (ref 6–20)
BUN/CREAT SERPL: 17.9 (ref 7–25)
CALCIUM SPEC-SCNC: 10 MG/DL (ref 8.6–10.5)
CHLORIDE SERPL-SCNC: 102 MMOL/L (ref 98–107)
CO2 SERPL-SCNC: 28.7 MMOL/L (ref 22–29)
CREAT SERPL-MCNC: 1.06 MG/DL (ref 0.76–1.27)
DEPRECATED RDW RBC AUTO: 38.7 FL (ref 37–54)
EGFRCR SERPLBLD CKD-EPI 2021: 83.4 ML/MIN/1.73
ERYTHROCYTE [DISTWIDTH] IN BLOOD BY AUTOMATED COUNT: 14.2 % (ref 12.3–15.4)
GLOBULIN UR ELPH-MCNC: 2.5 GM/DL
GLUCOSE SERPL-MCNC: 99 MG/DL (ref 65–99)
HCT VFR BLD AUTO: 43.6 % (ref 37.5–51)
HGB BLD-MCNC: 14.2 G/DL (ref 13–17.7)
MCH RBC QN AUTO: 25.2 PG (ref 26.6–33)
MCHC RBC AUTO-ENTMCNC: 32.6 G/DL (ref 31.5–35.7)
MCV RBC AUTO: 77.4 FL (ref 79–97)
PLATELET # BLD AUTO: 230 10*3/MM3 (ref 140–450)
PMV BLD AUTO: 9.6 FL (ref 6–12)
POTASSIUM SERPL-SCNC: 4.6 MMOL/L (ref 3.5–5.2)
PROT SERPL-MCNC: 6.6 G/DL (ref 6–8.5)
QT INTERVAL: 348 MS
QTC INTERVAL: 408 MS
RBC # BLD AUTO: 5.63 10*6/MM3 (ref 4.14–5.8)
SODIUM SERPL-SCNC: 142 MMOL/L (ref 136–145)
WBC NRBC COR # BLD: 5.07 10*3/MM3 (ref 3.4–10.8)

## 2022-08-15 PROCEDURE — 71046 X-RAY EXAM CHEST 2 VIEWS: CPT

## 2022-08-15 PROCEDURE — 93010 ELECTROCARDIOGRAM REPORT: CPT | Performed by: INTERNAL MEDICINE

## 2022-08-15 PROCEDURE — 85027 COMPLETE CBC AUTOMATED: CPT

## 2022-08-15 PROCEDURE — 36415 COLL VENOUS BLD VENIPUNCTURE: CPT

## 2022-08-15 PROCEDURE — 80053 COMPREHEN METABOLIC PANEL: CPT

## 2022-08-15 PROCEDURE — 93005 ELECTROCARDIOGRAM TRACING: CPT

## 2022-09-06 ENCOUNTER — CONSULT (OUTPATIENT)
Dept: BARIATRICS/WEIGHT MGMT | Facility: CLINIC | Age: 57
End: 2022-09-06

## 2022-09-06 VITALS
HEART RATE: 110 BPM | BODY MASS INDEX: 46.65 KG/M2 | TEMPERATURE: 98.2 F | DIASTOLIC BLOOD PRESSURE: 82 MMHG | RESPIRATION RATE: 18 BRPM | SYSTOLIC BLOOD PRESSURE: 118 MMHG | WEIGHT: 315 LBS | HEIGHT: 69 IN | OXYGEN SATURATION: 97 %

## 2022-09-06 DIAGNOSIS — K44.9 HIATAL HERNIA: ICD-10-CM

## 2022-09-06 PROBLEM — E66.01 MORBID OBESITY WITH BODY MASS INDEX (BMI) OF 50.0 TO 59.9 IN ADULT: Status: ACTIVE | Noted: 2022-09-06

## 2022-09-06 PROCEDURE — 99214 OFFICE O/P EST MOD 30 MIN: CPT | Performed by: SURGERY

## 2022-09-06 RX ORDER — SCOLOPAMINE TRANSDERMAL SYSTEM 1 MG/1
1 PATCH, EXTENDED RELEASE TRANSDERMAL ONCE
Status: CANCELLED | OUTPATIENT
Start: 2022-09-06 | End: 2022-09-06

## 2022-09-06 RX ORDER — SODIUM CHLORIDE 0.9 % (FLUSH) 0.9 %
3-10 SYRINGE (ML) INJECTION AS NEEDED
Status: CANCELLED | OUTPATIENT
Start: 2022-09-06

## 2022-09-06 RX ORDER — SODIUM CHLORIDE 0.9 % (FLUSH) 0.9 %
3 SYRINGE (ML) INJECTION EVERY 12 HOURS SCHEDULED
Status: CANCELLED | OUTPATIENT
Start: 2022-09-06

## 2022-09-06 RX ORDER — CHLORHEXIDINE GLUCONATE 0.12 MG/ML
30 RINSE ORAL
Status: CANCELLED | OUTPATIENT
Start: 2022-09-06 | End: 2022-09-06

## 2022-09-06 RX ORDER — ENOXAPARIN SODIUM 150 MG/ML
40 INJECTION SUBCUTANEOUS ONCE
Status: CANCELLED | OUTPATIENT
Start: 2022-09-06 | End: 2022-09-06

## 2022-09-06 RX ORDER — INSULIN LISPRO 200 [IU]/ML
INJECTION, SOLUTION SUBCUTANEOUS
COMMUNITY
Start: 2022-06-25

## 2022-09-06 RX ORDER — ACETAMINOPHEN 500 MG
1000 TABLET ORAL ONCE
Status: CANCELLED | OUTPATIENT
Start: 2022-09-06 | End: 2022-09-06

## 2022-09-06 RX ORDER — GABAPENTIN 100 MG/1
600 CAPSULE ORAL ONCE
Status: CANCELLED | OUTPATIENT
Start: 2022-09-06 | End: 2022-09-06

## 2022-09-06 RX ORDER — PANTOPRAZOLE SODIUM 40 MG/10ML
40 INJECTION, POWDER, LYOPHILIZED, FOR SOLUTION INTRAVENOUS ONCE
Status: CANCELLED | OUTPATIENT
Start: 2022-09-06 | End: 2022-09-06

## 2022-09-06 RX ORDER — FLUCONAZOLE 150 MG/1
150 TABLET ORAL DAILY
COMMUNITY
Start: 2022-08-30 | End: 2022-10-11 | Stop reason: HOSPADM

## 2022-09-06 RX ORDER — SODIUM CHLORIDE 9 MG/ML
150 INJECTION, SOLUTION INTRAVENOUS CONTINUOUS
Status: CANCELLED | OUTPATIENT
Start: 2022-09-06

## 2022-09-06 RX ORDER — LANSOPRAZOLE 30 MG/1
CAPSULE, DELAYED RELEASE ORAL
COMMUNITY
Start: 2022-06-10 | End: 2022-09-06

## 2022-09-06 NOTE — PROGRESS NOTES
[facility] GROUP BARIATRIC SURGERY  [address]  [address]  [facility] [address]  [phone]      Patient  Name:  Von Rodríguez  :  1965      Date of Visit: 22    Chief Complaint:  weight gain; unable to maintain weight loss.   Evaluate for possible metabolic and bariatric surgery    History of Present Illness:  Von Rodríguez is a 56 y.o. male who presents today for evaluation, education and consultation regarding metabolic and bariatric surgery (MBS).  Since last seen 2022 she has gained 5 pounds.  The patient returns for final visit prior to metabolic and bariatric surgery specifically the sleeve gastrectomy.  Original intake evaluation Mayi Aguilar PA-C dated 2022 reviewed.    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  At the patient underwent the bariatric process at [facility] and underwent attempted Zonia-en-Y gastric bypass on 8/3/2021 with Dr. Jenkins and that the procedure was aborted due to high trocar torque.  And that he has history of chronic episodic heartburn treated with daily omeprazole and underwent EGD in May 2021 which revealed a moderate size hiatal hernia and that he had his gallbladder removed for gallstones  and he has history of asthma not currently using inhalers also has a history of hypertension and underwent left heart cath in 2022 with Dr. Berkowitz, no coronary artery disease and that he is a type II diabetic diagnosed in  and has been on insulin since the time of his diagnosis with a most recent A1c of 6.2.      The patient has had issues with morbid obesity for years and only temporary success with non-surgical methods of weight loss.  The patient is seeking LSG to help with the morbid obesity related conditions of abnormal pulmonary function tests, asthma, chronic back pain, fungal intertrigo, elevated ALT level, hiatal hernia  with GE reflux disease, history of H. pylori antibody positive, hypertension, incisional hernia, microcytic erythrocytes, hyperlipidemia, type 2 insulin-dependent diabetes mellitus.    56-year-old morbidly obese male physician from [place].  He is aware at his BMI, at least theoretically, sleeve gastrectomy may be a first stage procedure.  His tachycardia in the office today is asymptomatic.  He is here today with his significant other female.  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  He says he had intense pain in his right upper abdominal incision after his recent attempt at laparoscopic bypass and then at 3 weeks he heard a pop and was pain-free and now has noted an incisional hernia at that site.  He does not recall what the biopsies for H. pylori at the time of his outlying EGD showed.  He says he did take the Prevpac for H. pylori positivity antibody testing and noticed no difference in his symptoms.  He says he only gets reflux when he overeats and not with particular foods.  No dysphagia.  He said that Dr. Jenkins said that given the technical difficulties he had with the patient's body habitus at surgery, it would be helpful may be to do his sleeve with the robot which he apparently does not offer.  After discussion with the patient that although I frequently use the robotics in my bariatric surgical practice, I do not feel using the robot would be necessary and he is comfortable with this.  He says call him by his first name.  He says they did not discuss post op home anticoagulation after planned RNY gastric bypass but he thinks this is a good idea and is agreeable to post op Eliquis as below.  [facility] could not get a [employer] scale readout.      Past Medical History:   Diagnosis Date   • Abnormal PFT     mild obstruction   • Asthma     childhood; does not have inhaler   • Back pain     chronic; no meds; no steroids   • Benign paroxysmal positional  vertigo    • Candidal intertrigo    • Elevated alanine aminotransferase (ALT) level    • Heartburn     EGD May 2021; no hx of h pylori; symptoms well controlled on omeprazole   • Helicobacter pylori ab+     took prevpak 6/22   • Hypertension    • Incisional hernia     s/p attempted lap RNY RUQ incision   • Microcytic erythrocytes    • JAD (obstructive sleep apnea)     s/p uvulaplasty   • Other hyperlipidemia    • S/P cholecystectomy 1993    lap; gallstones   • Type 2 diabetes mellitus without complication, with long-term current use of insulin (HCC)     dx in 2018; on insulin since diagnosis; last A1c 6.2     Past Surgical History:   Procedure Laterality Date   • DIAGNOSTIC LAPAROSCOPY      aborted RNY gastric bypass due to high trocar torque. Dr. Gregory Pradhan [facility]. Complicated by incisional hernia   • LAPAROSCOPIC CHOLECYSTECTOMY  1993    gallstones   • TONSILLECTOMY AND ADENOIDECTOMY  2007   • UVULOPLASTY      at time tonsils 2007 for JAD       Allergies   Allergen Reactions   • Codeine Rash       Current Outpatient Medications:   •  amLODIPine (NORVASC) 10 MG tablet, 10 mg., Disp: , Rfl:   •  B-D UF III MINI PEN NEEDLES 31G X 5 MM misc, , Disp: , Rfl:   •  carvedilol (COREG) 12.5 MG tablet, 12.5 mg., Disp: , Rfl:   •  Continuous Blood Gluc Sensor (FreeStyle Matt 14 Day Sensor) misc, , Disp: , Rfl:   •  fluconazole (DIFLUCAN) 150 MG tablet, Take 150 mg by mouth Daily., Disp: , Rfl:   •  HumaLOG KwikPen 200 UNIT/ML solution pen-injector, , Disp: , Rfl:   •  Insulin Glargine (Lantus SoloStar) 100 UNIT/ML injection pen, Every 12 (Twelve) Hours., Disp: , Rfl:   •  losartan-hydrochlorothiazide (HYZAAR) 100-12.5 MG per tablet, losartan 100 mg-hydrochlorothiazide 12.5 mg tablet  Take 1 tablet every day by oral route., Disp: , Rfl:   •  metFORMIN (GLUCOPHAGE) 1000 MG tablet, 1,000 mg 2 (Two) Times a Day., Disp: , Rfl:   •  omeprazole OTC (PriLOSEC OTC) 20 MG EC tablet, 20 mg., Disp: , Rfl:   •  rosuvastatin (CRESTOR) 10  MG tablet, 20 mg., Disp: , Rfl:   •  Semglee, yfgn, 100 UNIT/ML solution pen-injector, , Disp: , Rfl:   •  Trulicity 1.5 MG/0.5ML solution pen-injector, , Disp: , Rfl:   •  VITAMIN D PO, Take  by mouth., Disp: , Rfl:   •  apixaban (Eliquis) 2.5 MG tablet tablet, Take 1 tablet by mouth Every 12 (Twelve) Hours for 42 doses., Disp: 42 tablet, Rfl: 0  •  Paxlovid 20 x 150 MG & 10 x 100MG tablet therapy pack tablet, TAKE 2 NIRMATRELVIR TABLETS AND 1 RITONAVIR TABLET TOGETHER BY MOUTH TWICE DAILY FOR 5 DAYS, Disp: , Rfl:     Social History     Socioeconomic History   • Marital status:    Tobacco Use   • Smoking status: Never Smoker   • Smokeless tobacco: Never Used   Vaping Use   • Vaping Use: Never used   Substance and Sexual Activity   • Alcohol use: Not Currently   • Drug use: Never     Family History   Problem Relation Age of Onset   • Hypertension Mother    • Dementia Mother    • Heart disease Father    • Hypertension Father    • Stroke Father    • Hypertension Brother    • Diabetes Brother    • No Known Problems Brother        Review of Systems   Constitutional: Positive for fatigue and unexpected weight gain. Negative for chills, diaphoresis, fever and unexpected weight loss.   HENT: Negative for congestion and facial swelling.    Eyes: Negative for blurred vision, double vision and discharge.   Respiratory: Negative for chest tightness, shortness of breath and stridor.    Cardiovascular: Negative for chest pain, palpitations and leg swelling.   Gastrointestinal: Negative for blood in stool.   Endocrine: Negative for polydipsia.   Genitourinary: Negative for hematuria.   Musculoskeletal: Positive for back pain.   Skin: Negative for color change.   Allergic/Immunologic: Negative for immunocompromised state.   Neurological: Negative for confusion.   Psychiatric/Behavioral: Negative for self-injury.       I have reviewed the ROS and confirm that it's accurate today.    Physical Exam:  Vital Signs:  Weight: (!)  173 kg (381 lb)   Body mass index is 57.09 kg/m².  Temp: 98.2 °F (36.8 °C)   Heart Rate: 110   BP: 118/82     Physical Exam  Vitals reviewed.   Constitutional:       Appearance: He is well-developed.   HENT:      Head: Normocephalic and atraumatic.      Nose:      Comments: mask  Eyes:      Conjunctiva/sclera: Conjunctivae normal.      Pupils: Pupils are equal, round, and reactive to light.   Neck:      Thyroid: No thyromegaly.      Vascular: No carotid bruit.      Trachea: No tracheal deviation.   Cardiovascular:      Rate and Rhythm: Regular rhythm. Tachycardia present.      Heart sounds: Normal heart sounds.   Pulmonary:      Effort: Pulmonary effort is normal. No respiratory distress.      Breath sounds: Normal breath sounds.   Abdominal:      General: There is no distension.      Palpations: Abdomen is soft.      Tenderness: There is no abdominal tenderness.          Comments: Pannus   Musculoskeletal:         General: No deformity. Normal range of motion.      Cervical back: Normal range of motion and neck supple.   Skin:     General: Skin is warm and dry.      Findings: No rash.   Neurological:      Mental Status: He is alert and oriented to person, place, and time.      Cranial Nerves: No cranial nerve deficit.      Coordination: Coordination normal.   Psychiatric:         Behavior: Behavior normal.         Thought Content: Thought content normal.         Judgment: Judgment normal.         Patient Active Problem List   Diagnosis   • Asthma   • Hypertension   • Other hyperlipidemia   • JAD (obstructive sleep apnea)   • Heartburn   • S/P cholecystectomy   • Back pain   • Benign paroxysmal positional vertigo   • Type 2 diabetes mellitus without complication, with long-term current use of insulin ([facility])   • Hiatal hernia   • Morbid obesity with body mass index (BMI) of 50.0 to 59.9 in adult ([facility])       Assessment:    Von Rodríguez is a 56 y.o. year old male with medically complicated obesity.    Metabolic and  bariatric surgery is deemed medically necessary given the following obesity related comorbidities including abnormal pulmonary function tests, asthma, chronic back pain, fungal intertrigo, elevated ALT level, hiatal hernia with GE reflux disease, history of H. pylori antibody positive, hypertension, incisional hernia, microcytic erythrocytes, hyperlipidemia, type 2 insulin-dependent diabetes mellitus with current Weight: (!) 173 kg (381 lb) and Body mass index is 57.09 kg/m²..    Encounter Diagnoses   Name Primary?   • Body mass index (BMI) of 50-59.9 in adult (HCC) Yes   • Hiatal hernia       Patient is aware that surgery is a tool, and that weight loss and improvement in comorbidities is not guaranteed but only seen in the context of appropriate use, follow up and physical activity.    The patient was present for an approximately a 2.5 hour discussion of the purpose of MBS, how MBS is a tool to assist in achieving weight loss goals, the most common complications and how best to avoid them, and the strategies for short and long term weight loss and improvement in comorbidities.  Ample opportunity to discuss questions was available both in group and during the time of individual examination.    I reviewed his Aditya report which is negative.  Labs dated 8/15/2022 normal CMP normal CBC except for microcytic indices of note hemoglobin 14.2 chest x-ray dated 8/15/20.  Pulmonary function tests dated 6/9/2022 showing mild obstruction read by , no response to bronchodilators.  Of note FVC 92 FEV1 78 FEF 25-70 542 and FEF 7528 of note negative numbers after bronchodilators.  DLCO 82.  EKG dated 8/15/2022 normal sinus rhythm with ST elevation probably due to early repolarization confirmed by Dr. Hernández.  Psychosocial evaluation dated 5/26/2022 Camille Rivas, PhD good candidate.  Dietitian evaluation dated 5/26/2022 Cris cesar RD noting that the diet is high in processed foods carbohydrate especially and meals  "are fast convenience food primarily in diet is lacking a variety of fruits and vegetables.  Negative serum H. pylori testing dated 5/28/2022 other labs from that date normal TSH normal lipid panel hemoglobin A1c 6.10 unremarkable CMP except for CO2 of 20.8 and ALT of 43 unremarkable CBC of note hemoglobin 13.9 and low MCH.  Normal gastric emptying study dated 6/13/2022 with 69% emptying at 90 minutes no reflux visualized.  Cardiology clearance dated 6/10/2022 signature illegible noting it is okay to hold antiplatelet agents 1 week prior in 6 weeks after sleeve gastrectomy.  Cardiac catheterization dated 3/25/2022 Dr. Ye Berkowitz noting normal coronary arteries and normal ejection fraction and mildly elevated left ventricular end-diastolic pressure previous psychological evaluation dated 5/10/2021 with [facility] psychology appropriate candidate.  Note dated 5/5/2022 from the patient saying that he attempted to have the gastric bypass at [facility] which cannot be achieved and then he followed up with [place] [facility] \"but their end has been quite suboptimal to see the least.  For reasons I do not know I cannot get a response from them despite numerous emails and phone calls\".  He notes this brings him back to [facility] bariatrics where he originally started and that he had previously filled out an information packet but however the time we were not [facility] certified.  EGD dated 5/10/2021 Debra Arzola MD noting a moderate hiatal hernia.  No pathology available.  Please see scanned records that I have reviewed and signed off on today.  All of this in addition to the patient's unique history and exam has been taken into consideration in determining their appropriate candidacy for MBS.    Complications  of laparoscopic/possible robotic gastric sleeve were discussed. The patient is well aware of the potential complications of surgery that include but not limited to " "bleeding, infections, deep venous thrombosis, pulmonary embolism, pulmonary complications such as pneumonia, cardiac events, hernias, small bowel obstruction, damage to the spleen or other organs, bowel injury, disfiguring scars, failure to lose weight, need for additional surgery, conversion to an open procedure, and death. Patient is also aware of complications which apply in this particular procedure that can include but are not limited to a \"leak\" at the staple line which in some instances may require conversion to gastric bypass.    The patient is aware if a hiatal hernia is encountered, it likely will be repaired.  R/B/A Rx to hiatal hernia repair were discussed as outlined in our long consent form.  Briefly risks in addition to those for LSG include recurrent hernia, FILI, dysphagia, esophageal injury, pneumothorax, injury to the vagus nerves, injury to the thoracic duct, aorta or vena cava.    I discussed avoiding all tobacco products, nicotine,  and second hand smoke at least 2 weeks pre-operatively and 6 weeks post-operatively to minimize the risk of sleeve leak.  This included discussing the importance of avoiding even secondhand smoke as the risk of leak is increased.  Examples discussed:  Avoid going in a house or riding in a car where someone has previously smoked in the last 2 weeks and for 6 weeks postoperatively.  Avoid living in a house where someone smokes (even if it's in a separate room/patio/attached garage, etc.).   Avoid congregating with a group of people who are smoking even if it's outside.  It is OK to be around wood burning fires and barbecue.  I explained that I do not know if marijuana has a same effects but my overall recommendation is to avoid it for 2 weeks prior in 6 weeks after surgery.     Discussed the risks, benefits and alternative therapies at great length as outlined in our extensive consent forms, consent videos, and educational teaching process under the direction of the " center's .    A copy of the patient's signed informed consent is on file.    R/B/A Rx discussed to postop anticoagulation incl but not limited to bleeding, drug reaction, venothromboembolic events, etc. and agreeable to taking post op  Eliquis 2.5 mg po Q 12 hrs #42      Plan: After evaluation today I think the patient is a reasonable candidate for laparoscopic sleeve gastrectomy, hiatal hernia repair, and EGD.  He is not interested in Zonia-en-Y gastric bypass at this time.  Once again given his BMI and poor dietary habits noted on dietitian evaluation sleeve gastrectomy may be a first stage procedure.  This may be technically challenging as previous bariatric surgeon at [facility] could not technically perform the procedure due to the patient's body habitus and what is described as high trocar torque.  Once again nonetheless I suspect this can be done laparoscopically and do not feel at this time that the robot will be required.  We discussed the risks, benefits, and alternative therapies to concomitant ventral incisional hernia repair.  He is interested in concomitant repair.  After discussion he agrees that I would not proceed with repair with mesh (he saw complications with mesh fascial reinforcement with open Zonia-en-Y gastric bypass during his training and agrees).  He also understands that it is unlikely that I would attempt primary repair and if so there would be a high risk of recurrence.  He understands I may have similar technical issues with inability to perform the procedure and nonetheless wishes to proceed.    No primary care provider listed.    Pete Connelly MD              Answers for HPI/ROS submitted by the patient on 8/30/2022  What is the primary reason for your visit?: Other  Please describe your symptoms.: Bariatric surgery preop  Have you had these symptoms before?: No  How long have you been having these symptoms?: Greater than 2 weeks

## 2022-09-26 ENCOUNTER — TELEPHONE (OUTPATIENT)
Dept: BARIATRICS/WEIGHT MGMT | Facility: CLINIC | Age: 57
End: 2022-09-26

## 2022-09-26 NOTE — TELEPHONE ENCOUNTER
Pt messaged that his Eliquis is not covered with insurance. Pt does not have pharmacy benefits so I cannot do a prior authorization. Please advise, thanks!

## 2022-09-28 NOTE — TELEPHONE ENCOUNTER
Pt declined Lovenox injections, will keep trying to get ahold of his insurance and see what I can get done for a PA.

## 2022-09-30 ENCOUNTER — PRE-ADMISSION TESTING (OUTPATIENT)
Dept: PREADMISSION TESTING | Facility: HOSPITAL | Age: 57
End: 2022-09-30

## 2022-09-30 ENCOUNTER — PRIOR AUTHORIZATION (OUTPATIENT)
Dept: BARIATRICS/WEIGHT MGMT | Facility: CLINIC | Age: 57
End: 2022-09-30

## 2022-09-30 DIAGNOSIS — K44.9 HIATAL HERNIA: ICD-10-CM

## 2022-09-30 LAB
ABO GROUP BLD: NORMAL
DEPRECATED RDW RBC AUTO: 43.4 FL (ref 37–54)
ERYTHROCYTE [DISTWIDTH] IN BLOOD BY AUTOMATED COUNT: 15.1 % (ref 12.3–15.4)
HBA1C MFR BLD: 6.2 % (ref 4.8–5.6)
HCT VFR BLD AUTO: 42 % (ref 37.5–51)
HGB BLD-MCNC: 13.4 G/DL (ref 13–17.7)
MCH RBC QN AUTO: 25.3 PG (ref 26.6–33)
MCHC RBC AUTO-ENTMCNC: 31.9 G/DL (ref 31.5–35.7)
MCV RBC AUTO: 79.2 FL (ref 79–97)
PLATELET # BLD AUTO: 215 10*3/MM3 (ref 140–450)
PMV BLD AUTO: 9.3 FL (ref 6–12)
POTASSIUM SERPL-SCNC: 4 MMOL/L (ref 3.5–5.2)
RBC # BLD AUTO: 5.3 10*6/MM3 (ref 4.14–5.8)
RH BLD: POSITIVE
WBC NRBC COR # BLD: 4.56 10*3/MM3 (ref 3.4–10.8)

## 2022-09-30 PROCEDURE — 84132 ASSAY OF SERUM POTASSIUM: CPT

## 2022-09-30 PROCEDURE — 83036 HEMOGLOBIN GLYCOSYLATED A1C: CPT

## 2022-09-30 PROCEDURE — 87081 CULTURE SCREEN ONLY: CPT

## 2022-09-30 PROCEDURE — 36415 COLL VENOUS BLD VENIPUNCTURE: CPT

## 2022-09-30 PROCEDURE — 86901 BLOOD TYPING SEROLOGIC RH(D): CPT

## 2022-09-30 PROCEDURE — 85027 COMPLETE CBC AUTOMATED: CPT

## 2022-09-30 PROCEDURE — 86900 BLOOD TYPING SEROLOGIC ABO: CPT

## 2022-09-30 NOTE — TELEPHONE ENCOUNTER
[facility] ([place]: [id number]) - [phone]  Eliquis 2.5MG tablets       Status: PA Request    Created: September 30th, 2022    Sent: September 30th, 2022

## 2022-10-01 LAB — MRSA SPEC QL CULT: NORMAL

## 2022-10-04 ENCOUNTER — ANESTHESIA EVENT (OUTPATIENT)
Dept: PERIOP | Facility: HOSPITAL | Age: 57
End: 2022-10-04

## 2022-10-04 ENCOUNTER — TELEPHONE (OUTPATIENT)
Dept: BARIATRICS/WEIGHT MGMT | Facility: CLINIC | Age: 57
End: 2022-10-04

## 2022-10-04 RX ORDER — ENOXAPARIN SODIUM 100 MG/ML
40 INJECTION SUBCUTANEOUS
Qty: 8.4 ML | Refills: 0 | Status: SHIPPED | OUTPATIENT
Start: 2022-10-04 | End: 2022-10-11 | Stop reason: HOSPADM

## 2022-10-04 RX ORDER — SODIUM CHLORIDE 0.9 % (FLUSH) 0.9 %
10 SYRINGE (ML) INJECTION AS NEEDED
Status: CANCELLED | OUTPATIENT
Start: 2022-10-04

## 2022-10-04 RX ORDER — SODIUM CHLORIDE 0.9 % (FLUSH) 0.9 %
10 SYRINGE (ML) INJECTION EVERY 12 HOURS SCHEDULED
Status: CANCELLED | OUTPATIENT
Start: 2022-10-04

## 2022-10-04 NOTE — TELEPHONE ENCOUNTER
He was agreeable to the Lovenox injections. Please send them in and he will go pick them up :) Also advised about the gatorade and he understood with no further questions or concerns.

## 2022-10-04 NOTE — TELEPHONE ENCOUNTER
Pts Eliquis was denied twice through prior auth. Pt is persistant on not wanting to do Lovenox injections. Please advise, thanks!

## 2022-10-05 ENCOUNTER — ANESTHESIA (OUTPATIENT)
Dept: PERIOP | Facility: HOSPITAL | Age: 57
End: 2022-10-05

## 2022-10-05 ENCOUNTER — APPOINTMENT (OUTPATIENT)
Dept: GENERAL RADIOLOGY | Facility: HOSPITAL | Age: 57
End: 2022-10-05

## 2022-10-05 ENCOUNTER — HOSPITAL ENCOUNTER (INPATIENT)
Facility: HOSPITAL | Age: 57
LOS: 6 days | Discharge: HOME HEALTH | End: 2022-10-11
Attending: SURGERY | Admitting: SURGERY

## 2022-10-05 ENCOUNTER — ANESTHESIA EVENT CONVERTED (OUTPATIENT)
Dept: ANESTHESIOLOGY | Facility: HOSPITAL | Age: 57
End: 2022-10-05

## 2022-10-05 DIAGNOSIS — E66.01 MORBID OBESITY WITH BODY MASS INDEX (BMI) OF 50.0 TO 59.9 IN ADULT: Primary | ICD-10-CM

## 2022-10-05 DIAGNOSIS — K44.9 HIATAL HERNIA: ICD-10-CM

## 2022-10-05 PROBLEM — K21.9 GERD (GASTROESOPHAGEAL REFLUX DISEASE): Status: ACTIVE | Noted: 2022-05-26

## 2022-10-05 LAB
ABO GROUP BLD: NORMAL
BLD GP AB SCN SERPL QL: NEGATIVE
GLUCOSE BLDC GLUCOMTR-MCNC: 131 MG/DL (ref 70–130)
GLUCOSE BLDC GLUCOMTR-MCNC: 175 MG/DL (ref 70–130)
GLUCOSE BLDC GLUCOMTR-MCNC: 189 MG/DL (ref 70–130)
HCT VFR BLD AUTO: 43.6 % (ref 37.5–51)
HGB BLD-MCNC: 13.5 G/DL (ref 13–17.7)
RH BLD: POSITIVE
T&S EXPIRATION DATE: NORMAL

## 2022-10-05 PROCEDURE — 43843 GSTR RSTCV OTH/THN V-BAND: CPT | Performed by: PHYSICIAN ASSISTANT

## 2022-10-05 PROCEDURE — 25010000002 HYDROMORPHONE HCL-NACL 30-0.9 MG/30ML-% SOLUTION PREFILLED SYRINGE

## 2022-10-05 PROCEDURE — 85014 HEMATOCRIT: CPT | Performed by: SURGERY

## 2022-10-05 PROCEDURE — 25010000002 DEXAMETHASONE PER 1 MG: Performed by: NURSE ANESTHETIST, CERTIFIED REGISTERED

## 2022-10-05 PROCEDURE — 25010000002 AMISULPRIDE (ANTIEMETIC) 10 MG/4ML SOLUTION: Performed by: NURSE ANESTHETIST, CERTIFIED REGISTERED

## 2022-10-05 PROCEDURE — C9399 UNCLASSIFIED DRUGS OR BIOLOG: HCPCS | Performed by: NURSE ANESTHETIST, CERTIFIED REGISTERED

## 2022-10-05 PROCEDURE — 86923 COMPATIBILITY TEST ELECTRIC: CPT

## 2022-10-05 PROCEDURE — 82962 GLUCOSE BLOOD TEST: CPT

## 2022-10-05 PROCEDURE — P9041 ALBUMIN (HUMAN),5%, 50ML: HCPCS | Performed by: NURSE ANESTHETIST, CERTIFIED REGISTERED

## 2022-10-05 PROCEDURE — 25010000002 FENTANYL CITRATE (PF) 50 MCG/ML SOLUTION: Performed by: NURSE ANESTHETIST, CERTIFIED REGISTERED

## 2022-10-05 PROCEDURE — 86900 BLOOD TYPING SEROLOGIC ABO: CPT | Performed by: SURGERY

## 2022-10-05 PROCEDURE — 94799 UNLISTED PULMONARY SVC/PX: CPT

## 2022-10-05 PROCEDURE — 88307 TISSUE EXAM BY PATHOLOGIST: CPT | Performed by: SURGERY

## 2022-10-05 PROCEDURE — 25010000002 HYDROMORPHONE 1 MG/ML SOLUTION

## 2022-10-05 PROCEDURE — 25010000002 DROPERIDOL PER 5 MG

## 2022-10-05 PROCEDURE — 25010000002 PROPOFOL 10 MG/ML EMULSION: Performed by: NURSE ANESTHETIST, CERTIFIED REGISTERED

## 2022-10-05 PROCEDURE — 25010000002 ALBUMIN HUMAN 5% PER 50 ML: Performed by: NURSE ANESTHETIST, CERTIFIED REGISTERED

## 2022-10-05 PROCEDURE — 0DB60Z3 EXCISION OF STOMACH, OPEN APPROACH, VERTICAL: ICD-10-PCS | Performed by: SURGERY

## 2022-10-05 PROCEDURE — 0DJ08ZZ INSPECTION OF UPPER INTESTINAL TRACT, VIA NATURAL OR ARTIFICIAL OPENING ENDOSCOPIC: ICD-10-PCS | Performed by: SURGERY

## 2022-10-05 PROCEDURE — 63710000001 INSULIN LISPRO (HUMAN) PER 5 UNITS: Performed by: SURGERY

## 2022-10-05 PROCEDURE — 25010000002 ONDANSETRON PER 1 MG: Performed by: NURSE ANESTHETIST, CERTIFIED REGISTERED

## 2022-10-05 PROCEDURE — 43843 GSTR RSTCV OTH/THN V-BAND: CPT | Performed by: SURGERY

## 2022-10-05 PROCEDURE — 25010000002 DEXAMETHASONE SODIUM PHOSPHATE 10 MG/ML SOLUTION: Performed by: NURSE ANESTHETIST, CERTIFIED REGISTERED

## 2022-10-05 PROCEDURE — 25010000002 FENTANYL CITRATE (PF) 50 MCG/ML SOLUTION

## 2022-10-05 PROCEDURE — 86901 BLOOD TYPING SEROLOGIC RH(D): CPT | Performed by: SURGERY

## 2022-10-05 PROCEDURE — 99222 1ST HOSP IP/OBS MODERATE 55: CPT | Performed by: PHYSICIAN ASSISTANT

## 2022-10-05 PROCEDURE — 25010000002 GLUCAGON (HUMAN RECOMBINANT) 1 MG RECONSTITUTED SOLUTION: Performed by: NURSE ANESTHETIST, CERTIFIED REGISTERED

## 2022-10-05 PROCEDURE — 86850 RBC ANTIBODY SCREEN: CPT | Performed by: SURGERY

## 2022-10-05 PROCEDURE — 99024 POSTOP FOLLOW-UP VISIT: CPT | Performed by: SURGERY

## 2022-10-05 PROCEDURE — 85018 HEMOGLOBIN: CPT | Performed by: SURGERY

## 2022-10-05 DEVICE — SEALANT WND FIBRIN TISSEEL PREFIL/SYR/PRIMAFZ 10ML: Type: IMPLANTABLE DEVICE | Site: ABDOMEN | Status: FUNCTIONAL

## 2022-10-05 DEVICE — ABSORBABLE WOUND CLOSURE DEVICE
Type: IMPLANTABLE DEVICE | Site: ABDOMEN | Status: FUNCTIONAL
Brand: SYNETURE

## 2022-10-05 RX ORDER — IPRATROPIUM BROMIDE AND ALBUTEROL SULFATE 2.5; .5 MG/3ML; MG/3ML
3 SOLUTION RESPIRATORY (INHALATION) ONCE AS NEEDED
Status: DISCONTINUED | OUTPATIENT
Start: 2022-10-05 | End: 2022-10-05 | Stop reason: HOSPADM

## 2022-10-05 RX ORDER — CHLORHEXIDINE GLUCONATE 0.12 MG/ML
30 RINSE ORAL
Status: COMPLETED | OUTPATIENT
Start: 2022-10-05 | End: 2022-10-05

## 2022-10-05 RX ORDER — SCOLOPAMINE TRANSDERMAL SYSTEM 1 MG/1
1 PATCH, EXTENDED RELEASE TRANSDERMAL ONCE
Status: DISCONTINUED | OUTPATIENT
Start: 2022-10-05 | End: 2022-10-05

## 2022-10-05 RX ORDER — PANTOPRAZOLE SODIUM 40 MG/10ML
40 INJECTION, POWDER, LYOPHILIZED, FOR SOLUTION INTRAVENOUS
Status: DISCONTINUED | OUTPATIENT
Start: 2022-10-06 | End: 2022-10-07

## 2022-10-05 RX ORDER — CARVEDILOL 12.5 MG/1
12.5 TABLET ORAL EVERY 12 HOURS SCHEDULED
Status: DISCONTINUED | OUTPATIENT
Start: 2022-10-05 | End: 2022-10-11 | Stop reason: HOSPADM

## 2022-10-05 RX ORDER — CEFAZOLIN SODIUM IN 0.9 % NACL 3 G/100 ML
3 INTRAVENOUS SOLUTION, PIGGYBACK (ML) INTRAVENOUS ONCE
Status: COMPLETED | OUTPATIENT
Start: 2022-10-05 | End: 2022-10-05

## 2022-10-05 RX ORDER — AMLODIPINE BESYLATE 10 MG/1
10 TABLET ORAL
Status: DISCONTINUED | OUTPATIENT
Start: 2022-10-06 | End: 2022-10-11 | Stop reason: HOSPADM

## 2022-10-05 RX ORDER — MAGNESIUM HYDROXIDE 1200 MG/15ML
LIQUID ORAL AS NEEDED
Status: DISCONTINUED | OUTPATIENT
Start: 2022-10-05 | End: 2022-10-05 | Stop reason: HOSPADM

## 2022-10-05 RX ORDER — NALOXONE HCL 0.4 MG/ML
0.1 VIAL (ML) INJECTION
Status: DISCONTINUED | OUTPATIENT
Start: 2022-10-05 | End: 2022-10-11 | Stop reason: HOSPADM

## 2022-10-05 RX ORDER — ALBUTEROL SULFATE 2.5 MG/3ML
2.5 SOLUTION RESPIRATORY (INHALATION) EVERY 4 HOURS PRN
Status: DISCONTINUED | OUTPATIENT
Start: 2022-10-05 | End: 2022-10-11 | Stop reason: HOSPADM

## 2022-10-05 RX ORDER — CEFAZOLIN SODIUM IN 0.9 % NACL 3 G/100 ML
3 INTRAVENOUS SOLUTION, PIGGYBACK (ML) INTRAVENOUS EVERY 8 HOURS
Status: DISPENSED | OUTPATIENT
Start: 2022-10-05 | End: 2022-10-06

## 2022-10-05 RX ORDER — ACETAMINOPHEN 500 MG
1000 TABLET ORAL ONCE
Status: COMPLETED | OUTPATIENT
Start: 2022-10-05 | End: 2022-10-05

## 2022-10-05 RX ORDER — SODIUM CHLORIDE 9 MG/ML
250 INJECTION, SOLUTION INTRAVENOUS CONTINUOUS
Status: ACTIVE | OUTPATIENT
Start: 2022-10-05 | End: 2022-10-06

## 2022-10-05 RX ORDER — DEXAMETHASONE SODIUM PHOSPHATE 10 MG/ML
INJECTION, SOLUTION INTRAMUSCULAR; INTRAVENOUS
Status: COMPLETED | OUTPATIENT
Start: 2022-10-05 | End: 2022-10-05

## 2022-10-05 RX ORDER — ACETAMINOPHEN 160 MG/5ML
1000 SOLUTION ORAL EVERY 8 HOURS SCHEDULED
Status: ACTIVE | OUTPATIENT
Start: 2022-10-05 | End: 2022-10-07

## 2022-10-05 RX ORDER — OXYCODONE HYDROCHLORIDE 5 MG/1
5 TABLET ORAL EVERY 6 HOURS PRN
Status: DISCONTINUED | OUTPATIENT
Start: 2022-10-05 | End: 2022-10-11 | Stop reason: HOSPADM

## 2022-10-05 RX ORDER — CYANOCOBALAMIN 1000 UG/ML
1000 INJECTION, SOLUTION INTRAMUSCULAR; SUBCUTANEOUS ONCE
Status: COMPLETED | OUTPATIENT
Start: 2022-10-06 | End: 2022-10-06

## 2022-10-05 RX ORDER — DIPHENHYDRAMINE HYDROCHLORIDE 50 MG/ML
25 INJECTION INTRAMUSCULAR; INTRAVENOUS EVERY 4 HOURS PRN
Status: DISCONTINUED | OUTPATIENT
Start: 2022-10-05 | End: 2022-10-11 | Stop reason: HOSPADM

## 2022-10-05 RX ORDER — LIDOCAINE HYDROCHLORIDE 10 MG/ML
0.5 INJECTION, SOLUTION EPIDURAL; INFILTRATION; INTRACAUDAL; PERINEURAL ONCE AS NEEDED
Status: COMPLETED | OUTPATIENT
Start: 2022-10-05 | End: 2022-10-05

## 2022-10-05 RX ORDER — SODIUM CHLORIDE 0.9 % (FLUSH) 0.9 %
3-10 SYRINGE (ML) INJECTION AS NEEDED
Status: DISCONTINUED | OUTPATIENT
Start: 2022-10-05 | End: 2022-10-05 | Stop reason: HOSPADM

## 2022-10-05 RX ORDER — FIBRINOGEN HUMAN AND THROMBIN HUMAN 10 ML
KIT TOPICAL AS NEEDED
Status: DISCONTINUED | OUTPATIENT
Start: 2022-10-05 | End: 2022-10-05 | Stop reason: HOSPADM

## 2022-10-05 RX ORDER — GABAPENTIN 100 MG/1
100 CAPSULE ORAL 3 TIMES DAILY
Status: DISPENSED | OUTPATIENT
Start: 2022-10-05 | End: 2022-10-07

## 2022-10-05 RX ORDER — SODIUM CHLORIDE AND POTASSIUM CHLORIDE 150; 450 MG/100ML; MG/100ML
125 INJECTION, SOLUTION INTRAVENOUS CONTINUOUS
Status: DISCONTINUED | OUTPATIENT
Start: 2022-10-06 | End: 2022-10-11 | Stop reason: HOSPADM

## 2022-10-05 RX ORDER — SODIUM CHLORIDE, SODIUM LACTATE, POTASSIUM CHLORIDE, CALCIUM CHLORIDE 600; 310; 30; 20 MG/100ML; MG/100ML; MG/100ML; MG/100ML
9 INJECTION, SOLUTION INTRAVENOUS CONTINUOUS
Status: DISCONTINUED | OUTPATIENT
Start: 2022-10-05 | End: 2022-10-05

## 2022-10-05 RX ORDER — ONDANSETRON 2 MG/ML
4 INJECTION INTRAMUSCULAR; INTRAVENOUS ONCE AS NEEDED
Status: DISCONTINUED | OUTPATIENT
Start: 2022-10-05 | End: 2022-10-05 | Stop reason: HOSPADM

## 2022-10-05 RX ORDER — TRANEXAMIC ACID 10 MG/ML
1000 INJECTION, SOLUTION INTRAVENOUS ONCE
Status: DISCONTINUED | OUTPATIENT
Start: 2022-10-05 | End: 2022-10-05 | Stop reason: HOSPADM

## 2022-10-05 RX ORDER — FENTANYL CITRATE 50 UG/ML
INJECTION, SOLUTION INTRAMUSCULAR; INTRAVENOUS
Status: COMPLETED
Start: 2022-10-05 | End: 2022-10-05

## 2022-10-05 RX ORDER — PROCHLORPERAZINE MALEATE 10 MG
10 TABLET ORAL EVERY 6 HOURS PRN
Status: DISCONTINUED | OUTPATIENT
Start: 2022-10-05 | End: 2022-10-11 | Stop reason: HOSPADM

## 2022-10-05 RX ORDER — PROMETHAZINE HYDROCHLORIDE 12.5 MG/1
12.5 TABLET ORAL EVERY 6 HOURS PRN
Status: DISCONTINUED | OUTPATIENT
Start: 2022-10-05 | End: 2022-10-11 | Stop reason: HOSPADM

## 2022-10-05 RX ORDER — ONDANSETRON 2 MG/ML
INJECTION INTRAMUSCULAR; INTRAVENOUS AS NEEDED
Status: DISCONTINUED | OUTPATIENT
Start: 2022-10-05 | End: 2022-10-05 | Stop reason: SURG

## 2022-10-05 RX ORDER — PROMETHAZINE HYDROCHLORIDE 25 MG/1
25 SUPPOSITORY RECTAL ONCE AS NEEDED
Status: DISCONTINUED | OUTPATIENT
Start: 2022-10-05 | End: 2022-10-05 | Stop reason: HOSPADM

## 2022-10-05 RX ORDER — FENTANYL CITRATE 50 UG/ML
INJECTION, SOLUTION INTRAMUSCULAR; INTRAVENOUS AS NEEDED
Status: DISCONTINUED | OUTPATIENT
Start: 2022-10-05 | End: 2022-10-05 | Stop reason: SURG

## 2022-10-05 RX ORDER — ONDANSETRON 4 MG/1
4 TABLET, FILM COATED ORAL EVERY 4 HOURS PRN
Status: DISCONTINUED | OUTPATIENT
Start: 2022-10-05 | End: 2022-10-11 | Stop reason: HOSPADM

## 2022-10-05 RX ORDER — LABETALOL HYDROCHLORIDE 5 MG/ML
5 INJECTION, SOLUTION INTRAVENOUS
Status: DISCONTINUED | OUTPATIENT
Start: 2022-10-05 | End: 2022-10-05 | Stop reason: HOSPADM

## 2022-10-05 RX ORDER — SIMETHICONE 80 MG
80 TABLET,CHEWABLE ORAL 4 TIMES DAILY PRN
Status: DISCONTINUED | OUTPATIENT
Start: 2022-10-05 | End: 2022-10-10

## 2022-10-05 RX ORDER — MIDAZOLAM HYDROCHLORIDE 1 MG/ML
1 INJECTION INTRAMUSCULAR; INTRAVENOUS
Status: DISCONTINUED | OUTPATIENT
Start: 2022-10-05 | End: 2022-10-05 | Stop reason: HOSPADM

## 2022-10-05 RX ORDER — ONDANSETRON 4 MG/1
4 TABLET, FILM COATED ORAL EVERY 6 HOURS PRN
Status: DISCONTINUED | OUTPATIENT
Start: 2022-10-09 | End: 2022-10-11 | Stop reason: HOSPADM

## 2022-10-05 RX ORDER — SODIUM CHLORIDE 0.9 % (FLUSH) 0.9 %
3 SYRINGE (ML) INJECTION EVERY 12 HOURS SCHEDULED
Status: DISCONTINUED | OUTPATIENT
Start: 2022-10-05 | End: 2022-10-05 | Stop reason: HOSPADM

## 2022-10-05 RX ORDER — GABAPENTIN 300 MG/1
600 CAPSULE ORAL ONCE
Status: COMPLETED | OUTPATIENT
Start: 2022-10-05 | End: 2022-10-05

## 2022-10-05 RX ORDER — NALOXONE HCL 0.4 MG/ML
0.4 VIAL (ML) INJECTION AS NEEDED
Status: DISCONTINUED | OUTPATIENT
Start: 2022-10-05 | End: 2022-10-05 | Stop reason: HOSPADM

## 2022-10-05 RX ORDER — ACETAMINOPHEN 500 MG
1000 TABLET ORAL EVERY 8 HOURS SCHEDULED
Status: DISPENSED | OUTPATIENT
Start: 2022-10-05 | End: 2022-10-07

## 2022-10-05 RX ORDER — SODIUM CHLORIDE 9 MG/ML
150 INJECTION, SOLUTION INTRAVENOUS CONTINUOUS
Status: DISCONTINUED | OUTPATIENT
Start: 2022-10-05 | End: 2022-10-05

## 2022-10-05 RX ORDER — LIDOCAINE HYDROCHLORIDE 10 MG/ML
INJECTION, SOLUTION EPIDURAL; INFILTRATION; INTRACAUDAL; PERINEURAL AS NEEDED
Status: DISCONTINUED | OUTPATIENT
Start: 2022-10-05 | End: 2022-10-05 | Stop reason: SURG

## 2022-10-05 RX ORDER — NALOXONE HCL 0.4 MG/ML
0.4 VIAL (ML) INJECTION
Status: DISCONTINUED | OUTPATIENT
Start: 2022-10-05 | End: 2022-10-11 | Stop reason: HOSPADM

## 2022-10-05 RX ORDER — DROPERIDOL 2.5 MG/ML
0.62 INJECTION, SOLUTION INTRAMUSCULAR; INTRAVENOUS ONCE AS NEEDED
Status: DISCONTINUED | OUTPATIENT
Start: 2022-10-05 | End: 2022-10-05 | Stop reason: HOSPADM

## 2022-10-05 RX ORDER — ENOXAPARIN SODIUM 100 MG/ML
40 INJECTION SUBCUTANEOUS ONCE
Status: DISCONTINUED | OUTPATIENT
Start: 2022-10-05 | End: 2022-10-05 | Stop reason: HOSPADM

## 2022-10-05 RX ORDER — BUPIVACAINE HYDROCHLORIDE AND EPINEPHRINE 5; 5 MG/ML; UG/ML
INJECTION, SOLUTION PERINEURAL AS NEEDED
Status: DISCONTINUED | OUTPATIENT
Start: 2022-10-05 | End: 2022-10-05 | Stop reason: HOSPADM

## 2022-10-05 RX ORDER — GABAPENTIN 250 MG/5ML
100 SOLUTION ORAL 3 TIMES DAILY
Status: ACTIVE | OUTPATIENT
Start: 2022-10-05 | End: 2022-10-07

## 2022-10-05 RX ORDER — TRANEXAMIC ACID 10 MG/ML
1000 INJECTION, SOLUTION INTRAVENOUS ONCE
Status: COMPLETED | OUTPATIENT
Start: 2022-10-05 | End: 2022-10-05

## 2022-10-05 RX ORDER — FENTANYL CITRATE 50 UG/ML
50 INJECTION, SOLUTION INTRAMUSCULAR; INTRAVENOUS
Status: COMPLETED | OUTPATIENT
Start: 2022-10-05 | End: 2022-10-05

## 2022-10-05 RX ORDER — INSULIN LISPRO 100 [IU]/ML
0-9 INJECTION, SOLUTION INTRAVENOUS; SUBCUTANEOUS
Status: DISCONTINUED | OUTPATIENT
Start: 2022-10-05 | End: 2022-10-11 | Stop reason: HOSPADM

## 2022-10-05 RX ORDER — DROPERIDOL 2.5 MG/ML
0.62 INJECTION, SOLUTION INTRAMUSCULAR; INTRAVENOUS
Status: DISCONTINUED | OUTPATIENT
Start: 2022-10-05 | End: 2022-10-05 | Stop reason: HOSPADM

## 2022-10-05 RX ORDER — PROMETHAZINE HYDROCHLORIDE 25 MG/1
25 TABLET ORAL ONCE AS NEEDED
Status: DISCONTINUED | OUTPATIENT
Start: 2022-10-05 | End: 2022-10-05 | Stop reason: HOSPADM

## 2022-10-05 RX ORDER — LORAZEPAM 1 MG/1
1 TABLET ORAL EVERY 12 HOURS PRN
Status: DISCONTINUED | OUTPATIENT
Start: 2022-10-05 | End: 2022-10-11 | Stop reason: HOSPADM

## 2022-10-05 RX ORDER — DEXAMETHASONE SODIUM PHOSPHATE 4 MG/ML
INJECTION, SOLUTION INTRA-ARTICULAR; INTRALESIONAL; INTRAMUSCULAR; INTRAVENOUS; SOFT TISSUE AS NEEDED
Status: DISCONTINUED | OUTPATIENT
Start: 2022-10-05 | End: 2022-10-05 | Stop reason: SURG

## 2022-10-05 RX ORDER — ROCURONIUM BROMIDE 10 MG/ML
INJECTION, SOLUTION INTRAVENOUS AS NEEDED
Status: DISCONTINUED | OUTPATIENT
Start: 2022-10-05 | End: 2022-10-05 | Stop reason: SURG

## 2022-10-05 RX ORDER — LABETALOL HYDROCHLORIDE 5 MG/ML
INJECTION, SOLUTION INTRAVENOUS
Status: COMPLETED
Start: 2022-10-05 | End: 2022-10-05

## 2022-10-05 RX ORDER — HYDRALAZINE HYDROCHLORIDE 20 MG/ML
5 INJECTION INTRAMUSCULAR; INTRAVENOUS
Status: DISCONTINUED | OUTPATIENT
Start: 2022-10-05 | End: 2022-10-05 | Stop reason: HOSPADM

## 2022-10-05 RX ORDER — MORPHINE SULFATE 4 MG/ML
4 INJECTION, SOLUTION INTRAMUSCULAR; INTRAVENOUS
Status: DISCONTINUED | OUTPATIENT
Start: 2022-10-05 | End: 2022-10-11 | Stop reason: HOSPADM

## 2022-10-05 RX ORDER — DROPERIDOL 2.5 MG/ML
INJECTION, SOLUTION INTRAMUSCULAR; INTRAVENOUS
Status: COMPLETED
Start: 2022-10-05 | End: 2022-10-05

## 2022-10-05 RX ORDER — MEPERIDINE HYDROCHLORIDE 25 MG/ML
12.5 INJECTION INTRAMUSCULAR; INTRAVENOUS; SUBCUTANEOUS
Status: DISCONTINUED | OUTPATIENT
Start: 2022-10-05 | End: 2022-10-05 | Stop reason: HOSPADM

## 2022-10-05 RX ORDER — ONDANSETRON 2 MG/ML
4 INJECTION INTRAMUSCULAR; INTRAVENOUS EVERY 4 HOURS PRN
Status: ACTIVE | OUTPATIENT
Start: 2022-10-05 | End: 2022-10-09

## 2022-10-05 RX ORDER — LOSARTAN POTASSIUM 50 MG/1
100 TABLET ORAL
Status: DISCONTINUED | OUTPATIENT
Start: 2022-10-06 | End: 2022-10-11 | Stop reason: HOSPADM

## 2022-10-05 RX ORDER — SODIUM CHLORIDE, SODIUM LACTATE, POTASSIUM CHLORIDE, CALCIUM CHLORIDE 600; 310; 30; 20 MG/100ML; MG/100ML; MG/100ML; MG/100ML
150 INJECTION, SOLUTION INTRAVENOUS CONTINUOUS
Status: DISCONTINUED | OUTPATIENT
Start: 2022-10-05 | End: 2022-10-11 | Stop reason: HOSPADM

## 2022-10-05 RX ORDER — BUPIVACAINE HYDROCHLORIDE 2.5 MG/ML
INJECTION, SOLUTION EPIDURAL; INFILTRATION; INTRACAUDAL
Status: COMPLETED | OUTPATIENT
Start: 2022-10-05 | End: 2022-10-05

## 2022-10-05 RX ORDER — ALPRAZOLAM 0.25 MG/1
0.25 TABLET ORAL ONCE AS NEEDED
Status: DISCONTINUED | OUTPATIENT
Start: 2022-10-05 | End: 2022-10-11 | Stop reason: HOSPADM

## 2022-10-05 RX ORDER — LORAZEPAM 2 MG/ML
0.5 INJECTION INTRAMUSCULAR EVERY 12 HOURS PRN
Status: DISCONTINUED | OUTPATIENT
Start: 2022-10-05 | End: 2022-10-11 | Stop reason: HOSPADM

## 2022-10-05 RX ORDER — ENOXAPARIN SODIUM 100 MG/ML
40 INJECTION SUBCUTANEOUS EVERY 12 HOURS
Status: DISCONTINUED | OUTPATIENT
Start: 2022-10-06 | End: 2022-10-11 | Stop reason: HOSPADM

## 2022-10-05 RX ORDER — PANTOPRAZOLE SODIUM 40 MG/10ML
40 INJECTION, POWDER, LYOPHILIZED, FOR SOLUTION INTRAVENOUS ONCE
Status: COMPLETED | OUTPATIENT
Start: 2022-10-05 | End: 2022-10-05

## 2022-10-05 RX ORDER — HYDROMORPHONE HYDROCHLORIDE 1 MG/ML
0.5 INJECTION, SOLUTION INTRAMUSCULAR; INTRAVENOUS; SUBCUTANEOUS
Status: DISCONTINUED | OUTPATIENT
Start: 2022-10-05 | End: 2022-10-05 | Stop reason: HOSPADM

## 2022-10-05 RX ORDER — ROSUVASTATIN CALCIUM 10 MG/1
10 TABLET, COATED ORAL DAILY
Status: DISCONTINUED | OUTPATIENT
Start: 2022-10-06 | End: 2022-10-11 | Stop reason: HOSPADM

## 2022-10-05 RX ORDER — PROPOFOL 10 MG/ML
VIAL (ML) INTRAVENOUS AS NEEDED
Status: DISCONTINUED | OUTPATIENT
Start: 2022-10-05 | End: 2022-10-05 | Stop reason: SURG

## 2022-10-05 RX ORDER — HYDROMORPHONE HYDROCHLORIDE 2 MG/1
2 TABLET ORAL EVERY 4 HOURS PRN
Status: DISCONTINUED | OUTPATIENT
Start: 2022-10-05 | End: 2022-10-11 | Stop reason: HOSPADM

## 2022-10-05 RX ORDER — HYDRALAZINE HYDROCHLORIDE 20 MG/ML
10 INJECTION INTRAMUSCULAR; INTRAVENOUS
Status: DISCONTINUED | OUTPATIENT
Start: 2022-10-05 | End: 2022-10-11 | Stop reason: HOSPADM

## 2022-10-05 RX ORDER — HYDROCODONE BITARTRATE AND ACETAMINOPHEN 5; 325 MG/1; MG/1
1 TABLET ORAL ONCE AS NEEDED
Status: DISCONTINUED | OUTPATIENT
Start: 2022-10-05 | End: 2022-10-05 | Stop reason: HOSPADM

## 2022-10-05 RX ORDER — ALBUMIN, HUMAN INJ 5% 5 %
SOLUTION INTRAVENOUS CONTINUOUS PRN
Status: DISCONTINUED | OUTPATIENT
Start: 2022-10-05 | End: 2022-10-05 | Stop reason: SURG

## 2022-10-05 RX ADMIN — SODIUM CHLORIDE 1000 ML: 9 INJECTION, SOLUTION INTRAVENOUS at 06:38

## 2022-10-05 RX ADMIN — Medication 3 G: at 11:40

## 2022-10-05 RX ADMIN — ROCURONIUM BROMIDE 10 MG: 50 INJECTION, SOLUTION INTRAVENOUS at 08:45

## 2022-10-05 RX ADMIN — GABAPENTIN 600 MG: 300 CAPSULE ORAL at 06:37

## 2022-10-05 RX ADMIN — AMISULPRIDE 10 MG: 2.5 INJECTION, SOLUTION INTRAVENOUS at 10:29

## 2022-10-05 RX ADMIN — DEXAMETHASONE SODIUM PHOSPHATE 4 MG: 10 INJECTION, SOLUTION INTRAMUSCULAR; INTRAVENOUS at 07:43

## 2022-10-05 RX ADMIN — ALBUMIN HUMAN: 0.05 INJECTION, SOLUTION INTRAVENOUS at 09:55

## 2022-10-05 RX ADMIN — FENTANYL CITRATE 50 MCG: 50 INJECTION, SOLUTION INTRAMUSCULAR; INTRAVENOUS at 12:47

## 2022-10-05 RX ADMIN — HYDROMORPHONE HYDROCHLORIDE 0.5 MG: 1 INJECTION, SOLUTION INTRAMUSCULAR; INTRAVENOUS; SUBCUTANEOUS at 13:05

## 2022-10-05 RX ADMIN — Medication 3 G: at 07:43

## 2022-10-05 RX ADMIN — ONDANSETRON 4 MG: 2 INJECTION INTRAMUSCULAR; INTRAVENOUS at 10:29

## 2022-10-05 RX ADMIN — LABETALOL HYDROCHLORIDE 5 MG: 5 INJECTION, SOLUTION INTRAVENOUS at 12:56

## 2022-10-05 RX ADMIN — DROPERIDOL 0.62 MG: 2.5 INJECTION, SOLUTION INTRAMUSCULAR; INTRAVENOUS at 13:42

## 2022-10-05 RX ADMIN — FENTANYL CITRATE 50 MCG: 50 INJECTION, SOLUTION INTRAMUSCULAR; INTRAVENOUS at 12:05

## 2022-10-05 RX ADMIN — ROCURONIUM BROMIDE 10 MG: 50 INJECTION, SOLUTION INTRAVENOUS at 08:26

## 2022-10-05 RX ADMIN — LABETALOL HYDROCHLORIDE 5 MG: 5 INJECTION, SOLUTION INTRAVENOUS at 14:15

## 2022-10-05 RX ADMIN — PROPOFOL 200 MG: 10 INJECTION, EMULSION INTRAVENOUS at 07:40

## 2022-10-05 RX ADMIN — DEXAMETHASONE SODIUM PHOSPHATE 8 MG: 4 INJECTION, SOLUTION INTRA-ARTICULAR; INTRALESIONAL; INTRAMUSCULAR; INTRAVENOUS; SOFT TISSUE at 07:43

## 2022-10-05 RX ADMIN — SODIUM CHLORIDE 250 ML/HR: 9 INJECTION, SOLUTION INTRAVENOUS at 20:25

## 2022-10-05 RX ADMIN — GLUCAGON HYDROCHLORIDE 1 MG: KIT at 10:02

## 2022-10-05 RX ADMIN — INSULIN LISPRO 2 UNITS: 100 INJECTION, SOLUTION INTRAVENOUS; SUBCUTANEOUS at 20:34

## 2022-10-05 RX ADMIN — ACETAMINOPHEN 1000 MG: 500 TABLET, FILM COATED ORAL at 06:37

## 2022-10-05 RX ADMIN — ROCURONIUM BROMIDE 100 MG: 50 INJECTION, SOLUTION INTRAVENOUS at 07:40

## 2022-10-05 RX ADMIN — FENTANYL CITRATE 50 MCG: 50 INJECTION, SOLUTION INTRAMUSCULAR; INTRAVENOUS at 12:59

## 2022-10-05 RX ADMIN — ROCURONIUM BROMIDE 50 MG: 50 INJECTION, SOLUTION INTRAVENOUS at 08:05

## 2022-10-05 RX ADMIN — GLUCAGON HYDROCHLORIDE 1 MG: KIT at 09:19

## 2022-10-05 RX ADMIN — ROCURONIUM BROMIDE 10 MG: 50 INJECTION, SOLUTION INTRAVENOUS at 09:00

## 2022-10-05 RX ADMIN — HYDROMORPHONE HYDROCHLORIDE 0.5 MG: 1 INJECTION, SOLUTION INTRAMUSCULAR; INTRAVENOUS; SUBCUTANEOUS at 12:35

## 2022-10-05 RX ADMIN — FENTANYL CITRATE 50 MCG: 50 INJECTION, SOLUTION INTRAMUSCULAR; INTRAVENOUS at 12:21

## 2022-10-05 RX ADMIN — CARVEDILOL 12.5 MG: 12.5 TABLET, FILM COATED ORAL at 20:31

## 2022-10-05 RX ADMIN — CHLORHEXIDINE GLUCONATE 0.12% ORAL RINSE 30 ML: 1.2 LIQUID ORAL at 06:46

## 2022-10-05 RX ADMIN — PANTOPRAZOLE SODIUM 40 MG: 40 INJECTION, POWDER, FOR SOLUTION INTRAVENOUS at 06:37

## 2022-10-05 RX ADMIN — CHLORHEXIDINE GLUCONATE 0.12% ORAL RINSE 30 ML: 1.2 LIQUID ORAL at 06:38

## 2022-10-05 RX ADMIN — BUPIVACAINE HYDROCHLORIDE 60 ML: 2.5 INJECTION, SOLUTION EPIDURAL; INFILTRATION; INTRACAUDAL; PERINEURAL at 07:43

## 2022-10-05 RX ADMIN — ACETAMINOPHEN 1000 MG: 500 TABLET, FILM COATED ORAL at 22:27

## 2022-10-05 RX ADMIN — FENTANYL CITRATE 100 MCG: 50 INJECTION, SOLUTION INTRAMUSCULAR; INTRAVENOUS at 07:40

## 2022-10-05 RX ADMIN — LIDOCAINE HYDROCHLORIDE 100 MG: 10 INJECTION, SOLUTION EPIDURAL; INFILTRATION; INTRACAUDAL; PERINEURAL at 07:40

## 2022-10-05 RX ADMIN — SODIUM CHLORIDE 150 ML/HR: 9 INJECTION, SOLUTION INTRAVENOUS at 07:12

## 2022-10-05 RX ADMIN — TRANEXAMIC ACID 1000 MG: 10 INJECTION, SOLUTION INTRAVENOUS at 08:26

## 2022-10-05 RX ADMIN — LIDOCAINE HYDROCHLORIDE 0.5 ML: 10 INJECTION, SOLUTION EPIDURAL; INFILTRATION; INTRACAUDAL; PERINEURAL at 06:37

## 2022-10-05 RX ADMIN — GABAPENTIN 100 MG: 100 CAPSULE ORAL at 20:31

## 2022-10-05 RX ADMIN — Medication: at 13:17

## 2022-10-05 RX ADMIN — ROCURONIUM BROMIDE 20 MG: 50 INJECTION, SOLUTION INTRAVENOUS at 09:57

## 2022-10-05 RX ADMIN — SCOPALAMINE 1 PATCH: 1 PATCH, EXTENDED RELEASE TRANSDERMAL at 06:37

## 2022-10-05 RX ADMIN — SUGAMMADEX 200 MG: 100 INJECTION, SOLUTION INTRAVENOUS at 11:14

## 2022-10-05 RX ADMIN — SODIUM CHLORIDE: 9 INJECTION, SOLUTION INTRAVENOUS at 08:30

## 2022-10-05 NOTE — PLAN OF CARE
Goal Outcome Evaluation:     Pt. Admitted to [facility] from PACU, VSS on nonrebretaher, c/o pain controlled wih pca, pt. Still very drowsy, has not yet walked, will continue to monitor

## 2022-10-05 NOTE — OP NOTE
Preoperative Diagnosis:   Super morbid Obesity (173 kg, BMI 57.09) with Multiple Co-Morbidities    Postoperative Diagnosis:   Same    Procedure:                                             Laparoscopic converted to open vertical sleeve gastrectomy (85% subtotal vertical gastrectomy) over a 38 Niuean bougie dilator                                                                        EGD                                                                       Surgeon:                                                       SYDNI Connelly MD    Assistant:  Rosa Elena Rincon PA  was responsible for performing the following activities: Retraction, Suction, Irrigation, Closing and Placing Dressing and their skilled assistance was necessary for the success of this case.    Anesthesia:                                                   GETA    EBL:                                                              300 cc    Fluids:                                                           Crystalloid    Specimens:                                                   Subtotal gastrectomy    Drains:                                                           #10 RIP    Counts:                                                          Correct    Complications:                                               Intraperitoneal bleeding requiring emergent conversion to open laparotomy    Findings:   Extremely technically challenging laparoscopic operation.  After placing the third staple line and attempting posterior exposure there was grasper injury to a large vein requiring emergent conversion to open laparotomy.  On entering the abdomen the bleeding had stopped and the exact etiology unclear and relatively minor amount of blood and clot in the abdomen.  Sleeve completed but extremely technically challenging open as well.  Counts correct but x-rays obtained as there was emergent conversion to open.  No visible hiatal hernia laparoscopically or  "endoscopically, Z-line 40 cm.  Operative time roughly 3 hours 20 minutes.    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  He presents for elective laparoscopic sleeve gastrectomy.  He has undergone our extensive preoperative education teaching and consent process everything is in order and he wishes to proceed.      Operative technique:     The patient was brought to the operating room, and placed supine upon the operating room table.  SCD hose were placed, he underwent uneventful general endotracheal anesthesia per the anesthesiology staff, he received IV Ancef, and subcutaneous Lovenox, the anesthesiology staff performed a tap block, and his abdomen was prepped and draped with ChloraPrep in a sterile fashion, an Ioban was used as well, a Portillo catheter was not placed.    The peritoneal cavity was entered in the left upper quadrant initially using an 11 mm fascial splitting trocar however it did not reach and therefore an extra long 12 mm fascial splitting trocar was used utilizing an Optiview technique and the abdomen was insufflated to a pressure of 15 mmHg with CO2 gas.  Exploratory laparoscopy revealed no evidence of injury from the entrance technique, a mildly enlarged smooth fatty liver, status postcholecystectomy, a couple widemouth fascial weaknesses in the right upper abdomen without definite incisional hernia and a single omental adhesion to the right lateral abdomen.    Remaining trocars were placed under direct visualization including extra long 5 mm trochars in the right, mid, and left lateral abdomen and after infiltration of the peritoneum with local anesthetic (using a spinal needle which barely reached) under direct visualization the 19 mm Titan trocar was placed in the upper abdomen to the right of midline away " from the rectus diastases.  It did not reach the peritoneal cavity and therefore temporarily this incision was reapproximated with towel clips.    Through a stab incision in the epigastrium a Irving retractor was used to elevate the left lobe of the liver.  As noted in the operative report at [facility], there was extreme torque on the abdominal wall with resulted decrease in pneumoperitoneum.  We assured the patient was fully paralyzed.  There was an inordinate amount of intra-abdominal fat.  The visible stomach was very enlarged and thickened even after decompression.  In order to perform the procedure I essentially had a move all trochars up under direct visualization to a subcostal position and several laparoscopy incisions made as needed as the procedure proceeded.  It seemed to work best when I repositioned the entrance trocar at a low almost horizontal angle so as not to push down as hard on the abdominal wall.    Beginning approximately two thirds of the way around the greater curvature the stomach, the gastrocolic vessels were divided using the Enseal device.  This was a slow tedious process requiring repositioning of the fascial entrances of the trochars.  Excessive oozing noted and 1 g TXA IV given.  I replaced one of the upper left subcostal trochars with an extra long 12 mm fascial splitting trocar to allow placement of a Ray-Alena and better CO2 insufflation.  The several additional trochars were used to help retract.  Gastrocolic division proceeded slowly and tediously proximally taking down all the short gastric vessels and exposing the left cipriano.  No visible anterior or posterior hiatal hernias or lipomas and photodocumentation of the hiatus obtained.  Splenomegaly noted.  1 mg glucagon IV given.  Gastrocolic vessels were then divided medially to a few cm proximal to the pylorus.  Extensive adhesions of the posterior stomach to the pancreas and retroperitoneum essentially obliterating the  lesser sac were divided.  In addition omental adhesions to the medial right lobe of the liver were divided in order to fully mobilized the antrum for subtotal gastrectomy.      The Titan stapler had been opened at the beginning of the case but it was clear with the lack of trocar length, his body habitus, and his very enlarged stomach that its use would not be possible.  A iKONVERSE electric articulating stapler was therefore obtained.  Loads were 60 mm with included attached absorbable strips.  3 cm from the angularis was marked with a Kitner saturated with a marking pen.  The previously placed 38 Tunisian blunt-tipped Titan bougie was positioned into the antrum and the balloon inflated with 15 cc of saline and withdrawn to the angularis with the balloon's center lined up with the marking.  Under direct visualization an extra long 15 mm trocar was placed in the upper abdomen to the right and the first firing performed approximately 6 cm from the pylorus to the marking at the angularis lateral to the balloon with a black load.  The 15 mm fascial splitting trocar was then moved more medially through one of the other laparoscopy sites and a second firing was performed with another 60 mm black load.  A third firing of a 60 mm black load was positioned and on trying to expose a view from the posterior stomach the grasper inadvertently hit what appeared to be a large vein with brisk bleeding.  Given his body habitus and degree of bleeding I felt there was no way to control this laparoscopically and to minimize the amount of blood loss and hopefully avoid hemodynamic instability I elected to convert emergently to open laparotomy.  The stapler was removed without firing and pressure held on the area while open instruments were obtained.  An additional 1 g of TXA IV given.    Fortunately the patient remained hemodynamically stable while waiting as well as throughout the procedure.  The peritoneal cavity was entered  through a generous left subcostal incision extended in a chevron fashion for short distance on the right.  Large amount of subcutaneous tissue as expected.  A Bookwalter retractor was placed.  On entering the abdomen there was much less blood than expected.  Clot noted.  Surprisingly there appeared to be no active bleeding.  Is difficult as exposure was laparoscopically, it was even more challenging open.  The upper abdomen could only be exposed briefly with extreme difficulty.  The previous bougie dilator remained in place.  The subtotal vertical sleeve gastrectomy was completed with 3 additional black loads.  Incidentally all firings registered as thick tissue on the Signia indicator (3).  The bougie dilator balloon was desufflated and the bougie was removed.  The subtotal gastrectomy specimen was retrieved, it was a massively enlarged and thickened specimen.  It was sent to pathology for permanent section.    The sleeve was submerged under saline.  Upper endoscopy was performed, and the endoscope was advanced into the duodenal bulb.  No air bubbles or leak seen, no bleeding at the staple line, no narrowing at the angularis, no pyloric spasm or deformity, no gastritis, no hiatal hernia or Stuart's esophagus, Z-line roughly 40 cm, and the endoscope was withdrawn.  Endoscopic photodocumentation of the hiatus obtained showing no hiatal hernia.  Irrigation fluid was suctioned free.  The sleeve was resting nicely and hemostatic.  The sleeve staple line was treated with previously obtained 10 cc of aerosolized Tisseel fibrin glue.  A #10 flat fully perforated RIP drain was placed under the left lobe of the liver and up over the spleen and brought out through the superior right-sided trocar site incision and anchored to the skin with a 2-0 nylon suture.   10 mg Barhemsys IV given.  Fascia was closed in 2 layers using a running #1 Vicryl to close peritoneum, posterior rectus fascia on the right and posterior rectus  fascia, transversalis and internal oblique fascias on the left.  Anterior rectus fascia, linea alba, and external oblique fascia were closed with running #1 PDS suture beginning at both ends and tying in the middle of the right rectus.  Subcutaneous tissue was irrigated with saline and skin in all incisions were reapproximated with staples.  Dry sterile covderderms placed in a dry sterile dressing was placed around the RIP site.    Intraoperative x-rays confirmed no foreign bodies and confirmed by the radiologist. The patient tolerated the procedure well without further incident and was taken to the recovery room in stable condition.

## 2022-10-05 NOTE — PROGRESS NOTES
"Cc: NOS lap converted to open LSG  \"doing ok\"    He is in bed wearing a nonrebreather facemask.  He says he does not use a CPAP.  He has not used his spirometer.  He was able to get it to almost 2000.  No pulmonary complaints.  Says his pain is controlled with PCA.  He says his mouth feels dry.  He has voided at the bedside but not been out of bed.  RIP bulb full with bloody nonclotting fluid and the bulb is not compressed, emptied with the nurse.  Blood pressure has been running very high since surgery, currently listed at 163/113 but as high as 183/116.  Heart rate at one-point listed at 186, currently 96.  Saturation 93%.  He is groggy but no apparent distress.  Not pale.  Abdomen seems appropriate but difficult to assess given the size.  Dressings are dry.    Will check an H&H, consult hospitalist service for blood pressure management.  Bolus normal saline.  Continue close observation and telemetry with heart monitor and oxygen saturation monitor.  Discussed your findings and that no hiatal hernia encountered laparoscopically or endoscopically at surgery and the hiatus left undisturbed.  "

## 2022-10-05 NOTE — ANESTHESIA POSTPROCEDURE EVALUATION
Patient: Von Rodríguez    Procedure Summary     Date: 10/05/22 [facility] / [facility]    Anesthesia Start: 0733 Anesthesia Stop: 1155    Procedures:       GASTRIC SLEEVE LAPAROSCOPIC (N/A Abdomen)      ESOPHAGOGASTRODUODENOSCOPY (N/A Esophagus)      LAPAROTOMY EXPLORATORY (N/A Abdomen) Diagnosis:       Body mass index (BMI) of 50-59.9 in adult (HCC)      (Body mass index (BMI) of 50-59.9 in adult (HCC) [Z68.43])      (Hiatal hernia [K44.9])    Surgeons: Pete Connelly MD Provider: Dat Estrella MD    Anesthesia Type: general with block ASA Status: 3          Anesthesia Type: general with block    Vitals  Vitals Value Taken Time   /93 10/05/22 1150   Temp     Pulse 183 10/05/22 1154   Resp     SpO2 93 % 10/05/22 1154   Vitals shown include unvalidated device data.        Post Anesthesia Care and Evaluation    Patient location during evaluation: PACU  Patient participation: complete - patient participated  Level of consciousness: awake and alert  Pain management: adequate    Airway patency: patent  Anesthetic complications: No anesthetic complications  PONV Status: none  Cardiovascular status: hemodynamically stable and acceptable  Respiratory status: nonlabored ventilation, acceptable and nasal cannula  Hydration status: acceptable

## 2022-10-05 NOTE — ANESTHESIA PREPROCEDURE EVALUATION
Anesthesia Evaluation     Patient summary reviewed and Nursing notes reviewed   NPO Solid Status: > 8 hours  NPO Liquid Status: > 2 hours           Airway   Mallampati: IV  TM distance: >3 FB  Neck ROM: full  Difficult intubation highly probable  Dental      Pulmonary     breath sounds clear to auscultation  (+) asthma,sleep apnea,   Cardiovascular     ECG reviewed  Rhythm: regular  Rate: normal    (+) hypertension, hyperlipidemia,       Neuro/Psych  GI/Hepatic/Renal/Endo    (+) morbid obesity,  diabetes mellitus,     Musculoskeletal     (+) back pain,   Abdominal   (+) obese,    Substance History      OB/GYN          Other                        Anesthesia Plan    ASA 3     general with block     intravenous induction     Anesthetic plan, risks, benefits, and alternatives have been provided, discussed and informed consent has been obtained with: patient.    Plan discussed with CRNA.        CODE STATUS:

## 2022-10-05 NOTE — BRIEF OP NOTE
GASTRIC SLEEVE LAPAROSCOPIC, ESOPHAGOGASTRODUODENOSCOPY, LAPAROTOMY EXPLORATORY  Progress Note    Von Rodríguez  10/5/2022    Pre-op Diagnosis:   Body mass index (BMI) of 50-59.9 in adult ([facility]) [Z68.43]  Hiatal hernia [K44.9]       Post-Op Diagnosis Codes:     * Body mass index (BMI) of 50-59.9 in adult ([facility]) [Z68.43]    Procedure/CPT® Codes:  LA LAP, KENDALL RESTRICT PROC, LONGITUDINAL GASTRECTOMY [46597]  LA EXPLORATORY OF ABDOMEN [82138]  LA ESOPHAGOGASTRODUODENOSCOPY TRANSORAL DIAGNOSTIC [75316]      Procedure(s):  GASTRIC SLEEVE LAPAROSCOPIC CONVERTED TO OPEN LAPAROTOMY EXPLORATORY  ESOPHAGOGASTRODUODENOSCOPY          Surgeon(s):  Pete Connelly MD    Anesthesia: General with Block    Staff:   Circulator: Bere Smith RN; Elisa Hammer, ARLETTE; Jacque Camargo RN  Scrub Person: Arlene Chung; Efren Palm; Bernadette Blanco  Nursing Assistant: Lopez Reyes PCT  Assistant: Rosa Elena Rincon PA  Assistant: Rosa Elena Rincon PA retraction, suction, irrigation, closure.      Estimated Blood Loss: 300 mL    Urine Voided: * No values recorded between 10/5/2022  7:33 AM and 10/5/2022 11:24 AM *    Specimens:                Specimens     ID Source Type Tests Collected By Collected At Frozen?    A Stomach Tissue · TISSUE PATHOLOGY EXAM   Pete Connelly MD 10/5/22 3502 No    Description: SUB-TOTAL GASTRECTOMY    This specimen was not marked as sent.                Drains:   Closed/Suction Drain 1 Lateral RLQ (Active)       Findings:   Extremely technically challenging laparoscopic operation.  After placing the third staple line and attempting posterior exposure there was grasper injury to a large vein requiring emergent conversion to open laparotomy.  On entering the abdomen the bleeding had stopped and the exact etiology unclear and relatively minor amount of blood and clot in the abdomen.  Sleeve completed but extremely technically challenging open as well.  Counts correct but x-rays obtained as  there was emergent conversion to open.  No visible hiatal hernia laparoscopically or endoscopically, Z-line 40 cm.  Operative time roughly 3 hours 20 minutes.        Complications: Intraperitoneal bleeding requiring emergent conversion to open laparotomy.    Assistant: Rosa Elena Rincon PA  was responsible for performing the following activities: Retraction, Suction, Irrigation, Closing and Placing Dressing and their skilled assistance was necessary for the success of this case.    Pete Connelly MD     Date: 10/5/2022  Time: 11:24 EDT

## 2022-10-05 NOTE — ANESTHESIA PROCEDURE NOTES
Airway  Urgency: elective    Date/Time: 10/5/2022 7:41 AM  Airway not difficult    General Information and Staff    Patient location during procedure: OR  CRNA/CAA: Wilfredo Pak CRNA    Indications and Patient Condition  Indications for airway management: airway protection    Preoxygenated: yes  MILS not maintained throughout  Mask difficulty assessment: 1 - vent by mask    Final Airway Details  Final airway type: endotracheal airway      Successful airway: ETT  Cuffed: yes   Successful intubation technique: direct laryngoscopy and video laryngoscopy  Endotracheal tube insertion site: oral  Blade: María  Blade size: 4  ETT size (mm): 8.0  Cormack-Lehane Classification: grade I - full view of glottis  Placement verified by: chest auscultation and capnometry   Measured from: lips  ETT/EBT  to lips (cm): 22  Number of attempts at approach: 1  Assessment: lips, teeth, and gum same as pre-op and atraumatic intubation    Additional Comments  Negative epigastric sounds, Breath sound equal bilaterally with symmetric chest rise and fall

## 2022-10-05 NOTE — CONSULTS
[facility] Medicine Services  CONSULT NOTE      Patient Name: Von Rodríguez  : 1965  MRN: [id number]    Primary Care Physician: Provider, No Known  Provider requesting consultation: Pete Connelly MD    Subjective   Subjective     Reason for Consultation:  Medical management    HPI:  Von Rodríguez is a 56 y.o. male with a past medical history significant for hypertension, HLD, DM2, BPPV, GERD, asthma, JAD, and morbid obesity. Presents as a consult from Dr. Connelly. He is less than 24 hours s/p laparoscopic gastric sleeve and EGD. He is stable. Tolerated procedure well. Pain is controlled with PCA. Nurse is reporting oxygenation does drop mildly while patient is sleeping. He has worn CPAP remotely but wishes to avoid it if possible. Reports he still feels groggy from anesthesia, but otherwise no complaints. No fever, cough, congestion, SOB, or chest pain. No headache or focal weakness/paratehesias. Blood pressure has been elevated slightly. Antihypertensives resumed by bariatric service. Will continue to follow for medical management.      Review of Systems   Constitutional: Negative for chills, fatigue and fever.   HENT: Negative for congestion and trouble swallowing.    Eyes: Negative for photophobia and visual disturbance.   Respiratory: Negative for cough and shortness of breath.    Cardiovascular: Negative for chest pain and leg swelling.   Gastrointestinal: Negative for abdominal pain, diarrhea, nausea and vomiting.   Endocrine: Negative for cold intolerance and heat intolerance.   Genitourinary: Negative for dysuria and flank pain.   Musculoskeletal: Negative for back pain and gait problem.   Skin: Negative for pallor and rash.   Allergic/Immunologic: Negative for immunocompromised state.   Neurological: Negative for dizziness.   Hematological: Negative for adenopathy.   Psychiatric/Behavioral: Negative for agitation and confusion.       All other systems reviewed and  are negative.     Personal History     Past Medical History:   Diagnosis Date   • Abnormal PFT     mild obstruction   • Asthma     childhood; does not have inhaler   • Back pain     chronic; no meds; no steroids   • Benign paroxysmal positional vertigo    • Candidal intertrigo    • Cornea transplant recipient     left only   • Elevated alanine aminotransferase (ALT) level    • Heartburn     EGD May 2021; no hx of h pylori; symptoms well controlled on omeprazole   • Helicobacter pylori ab+     took prevpak 6/22   • Hiatal hernia with gastroesophageal reflux     EGD 5/21 Debra Tucker MD   • Hypertension    • Incisional hernia     s/p attempted lap RNY RUQ incision   • Microcytic erythrocytes    • JAD (obstructive sleep apnea)     s/p uvulaplasty   • Other hyperlipidemia    • S/P cholecystectomy 1993    lap; gallstones   • Type 2 diabetes mellitus without complication, with long-term current use of insulin ([facility])     dx in 2018; on insulin since diagnosis; last A1c 6.2   • Wears glasses        Past Surgical History:   Procedure Laterality Date   • COLONOSCOPY     • CORNEAL TRANSPLANT Left    • DIAGNOSTIC LAPAROSCOPY      aborted RNY gastric bypass due to high trocar torque. Dr. Gregory [facility]. Complicated by incisional hernia   • ENDOSCOPY     • LAPAROSCOPIC CHOLECYSTECTOMY  1993    gallstones   • TONSILLECTOMY AND ADENOIDECTOMY  2007   • UVULOPLASTY      at time tonsils 2007 for JAD   • WISDOM TOOTH EXTRACTION      all removed       Family History: family history includes Dementia in his mother; Diabetes in his brother; Heart disease in his father; Hypertension in his brother, father, and mother; No Known Problems in his brother; Stroke in his father. Otherwise pertinent FHx was reviewed and unremarkable.     Social History:  reports that he has never smoked. He has never used smokeless tobacco. He reports previous alcohol use. He reports that he does not use drugs.    Medications:  Dulaglutide, Enoxaparin Sodium,  FreeStyle Matt 14 Day Sensor, Insulin Glargine, Insulin Glargine-yfgn, Insulin Lispro, Insulin Pen Needle, Nirmatrelvir&Ritonavir 300/100, Vitamin D, amLODIPine, carvedilol, fluconazole, losartan-hydrochlorothiazide, metFORMIN, omeprazole OTC, and rosuvastatin    Scheduled Meds:acetaminophen, 1,000 mg, Oral, Q8H   Or  acetaminophen, 1,000 mg, Oral, Q8H  [START ON 10/6/2022] amLODIPine, 10 mg, Oral, Q24H  carvedilol, 12.5 mg, Oral, Q12H  ceFAZolin, 3 g, Intravenous, Q8H  [START ON 10/6/2022] cyanocobalamin, 1,000 mcg, Intramuscular, Once  [START ON 10/6/2022] enoxaparin, 40 mg, Subcutaneous, Q12H  gabapentin, 100 mg, Oral, TID   Or  gabapentin, 100 mg, Oral, TID  insulin lispro, 0-9 Units, Subcutaneous, 4x Daily AC & at Bedtime  [START ON 10/6/2022] losartan, 100 mg, Oral, Q24H  [START ON 10/6/2022] pantoprazole, 40 mg, Intravenous, Q AM  [START ON 10/6/2022] rosuvastatin, 10 mg, Oral, Daily  Scopolamine, 1 patch, Transdermal, Once  [START ON 10/6/2022] IV Fluids 1000 mL + additives, 100 mL/hr, Intravenous, Once      Continuous Infusions:HYDROmorphone HCl-NaCl,   lactated ringers, 9 mL/hr  lactated ringers, 150 mL/hr, Last Rate: 150 mL/hr (10/05/22 1613)  [START ON 10/6/2022] sodium chloride 0.45 % with KCl 20 mEq, 125 mL/hr  sodium chloride, 150 mL/hr, Last Rate: 150 mL/hr (10/05/22 0733)  sodium chloride, 250 mL/hr      PRN Meds:.Albuterol Sulfate NEB Orderable  •  ALPRAZolam  •  diphenhydrAMINE  •  [START ON 10/6/2022] ferric gluconate  •  hydrALAZINE  •  HYDROmorphone **AND** naloxone  •  HYDROmorphone  •  LORazepam  •  LORazepam  •  Morphine **AND** naloxone  •  naloxone  •  ondansetron **FOLLOWED BY** [START ON 10/9/2022] ondansetron  •  ondansetron  •  oxyCODONE  •  phenol  •  prochlorperazine  •  promethazine  •  simethicone    Allergies   Allergen Reactions   • Codeine Rash       Objective   Objective     Vital Signs:   Temp:  [97 °F (36.1 °C)-97.4 °F (36.3 °C)] 97.4 °F (36.3 °C)  Heart Rate:  []  96  Resp:  [14-18] 18  BP: (130-183)/() 163/113  Flow (L/min):  [15] 15    Physical Exam  Constitutional: Awake, alert  Eyes: PERRLA, sclerae anicteric, no conjunctival injection  HENT: NCAT, mucous membranes moist  Neck: Supple, no thyromegaly, no lymphadenopathy, trachea midline  Respiratory: Clear to auscultation bilaterally, nonlabored respirations   Cardiovascular: RRR, no murmurs, rubs, or gallops, palpable pedal pulses bilaterally  Gastrointestinal: Positive bowel sounds, soft, nontender, nondistended   Abdominal incisions clean and viable  Musculoskeletal: No bilateral ankle edema, no clubbing or cyanosis to extremities  Psychiatric: Appropriate affect, cooperative  Neurologic: Oriented x 3, strength symmetric in all extremities, Cranial Nerves grossly intact to confrontation, speech clear  Skin: No rashes      Result Review:  I have personally reviewed the results from the time of admission and agree with these findings:  [x]  Laboratory  [x]  Radiology  []  EKG/Telemetry   []  Pathology  []  Old records  []  Other:  Most notable findings include: vitals stable    LAB RESULTS:      Lab 09/30/22 0937   WBC 4.56   HEMOGLOBIN 13.4   HEMATOCRIT 42.0   PLATELETS 215   MCV 79.2         Lab 09/30/22 0937   POTASSIUM 4.0   HEMOGLOBIN A1C 6.20*                     Lab 10/05/22  1015   ABO TYPING AB   RH TYPING Positive   ANTIBODY SCREEN Negative         Brief Urine Lab Results     None        Microbiology Results (last 10 days)     Procedure Component Value - Date/Time    MRSA Screen Culture (Outpatient) - Swab, Nares [[id number]]  (Normal) Collected: 09/30/22 0937    Lab Status: Final result Specimen: Swab from Nares Updated: 10/01/22 1357     MRSA Screen Cx No Methicillin Resistant Staphylococcus aureus isolated    Narrative:      The negative predictive value of this diagnostic test is high and should only be used to consider de-escalating anti-MRSA therapy. A positive result may indicate colonization with  "MRSA and must be correlated clinically.          Peripheral Block    Result Date: 10/5/2022  Wilfredo Pak CRNA     10/5/2022  7:44 AM Peripheral Block Patient reassessed immediately prior to procedure Patient location during procedure: OR Reason for block: at surgeon's request and post-op pain management Performed by CRNA/CAA: Uma Wilson CRNA Preanesthetic Checklist Completed: patient identified, IV checked, site marked, risks and benefits discussed, surgical consent, monitors and equipment checked, pre-op evaluation and timeout performed Prep: Pt Position: supine Sterile barriers:cap, gloves, mask and washed/disinfected hands Prep: ChloraPrep Patient monitoring: blood pressure monitoring, continuous pulse oximetry and EKG Procedure Sedation: yes Performed under: general Guidance:ultrasound guided Images:still images obtained, printed/placed on chart Laterality:Bilateral Block Type:TAP Injection Technique:single-shot Needle Type:short-bevel and echogenic Needle Gauge:20 G Resistance on Injection: none Medications Used: dexamethasone sodium phosphate injection, 4 mg bupivacaine PF (MARCAINE) 0.25 % injection, 60 mL Med administered at 10/5/2022 7:43 AM Medications Comment:Block Injection:  LA dose divided between Right and Left block Post Assessment Injection Assessment: negative aspiration for heme, incremental injection and no paresthesia on injection Patient Tolerance:comfortable throughout block Complications:no Additional Notes Subcostal TAPs A high-frequency linear transducer, with sterile cover, was placed sub-xiphoid to identify Linea Alba, right and left Rectus Abdominus Muscles (SHANTI). The transducer was moved either right or left subcostally to identify the SHANTI and the Transverse Abdominus Muscle (MARI). The insertion site was prepped in sterile fashion and then localized with 2-5 ml of 1% Lidocaine. Using ultrasound-guidance, a 20-gauge B-Canales 4\" Ultraplex 360 non-stimulating echogenic " "needle was advanced in plane, from medial to lateral, until the tip of the needle was in the fascial plane between the SHANTI and MARI. 1-3ml of preservative free normal saline was used to hydro-dissect the fascial planes. After the fascial plane was verified, the local anesthetic (LA) was injected. The procedure was repeated on the opposite side for bilateral coverage. Aspiration every 5 ml to prevent intravascular injection. Injection was completed with negative aspiration of blood and negative intravascular injection. Injection pressures were normal with minimal resistance. The subcostal approach to the TAP nerve block ideally anesthetizes the intercostal nerves T6-T9. Mid-Axillary/Lateral TAPs A high-frequency linear transducer, with sterile cover, was placed in the midaxillary line between the ASIS and costal margin. The External Oblique Muscle (EOM), Internal Oblique Muscle (IOM), Transverse Abdominus Muscle (MARI), and Peritoneum were identified. The insertion site was prepped in sterile fashion and then localized with 2-5 ml of 1% Lidocaine. Using ultrasound-guidance, a 20-gauge B-Canales 4\" Ultraplex 360 non-stimulating echogenic needle was advanced in plane, from medial to lateral, until the tip of the needle was in the fascial plane between the IOM and MARI. 1-3ml of preservative free normal saline was used to hydro-dissect the fascial planes. After the fascial plane was verified, the local anesthetic (LA) was injected. The procedure was repeated on the opposite side for bilateral coverage. Aspiration every 5 ml to prevent intravascular injection. Injection was completed with negative aspiration of blood and negative intravascular injection. Injection pressures were normal with minimal resistance. Midaxillary TAPs should reach intercostal nerves T10- T11 and the subcostal nerve T12.     XR Lost Needle / Instrument    Result Date: 10/5/2022  DATE OF EXAM: 10/5/2022 10:37 AM  PROCEDURE: XR LOST NEEDLE/INSTRUMENT-  " INDICATIONS: no count ; K44.9-Diaphragmatic hernia without obstruction or gangrene; Z68.43-Body mass index (BMI) 50.0-59.9, adult  COMPARISON: No comparisons available.  TECHNIQUE: A total of three portable AP images to include the abdomen and pelvis.  FINDINGS: History indicates no preprocedure count. RIP drain is seen in the upper abdominal midline. There are multiple skin staples. Various lines are superimposed lateral to the right chest and abdomen. No metallic foreign body is seen to suggest retained needle or instrument. Bowel gas pattern is nonobstructive.      Impression: No evidence of retained needle or instrument.            Assessment & Plan   Assessment & Plan     Active Hospital Problems    Diagnosis  POA   • **Morbid obesity with body mass index (BMI) of 50.0 to 59.9 in adult ([facility]) [E66.01, Z68.43]  Not Applicable     Priority: High   • Hypertension [I10]  Yes     Priority: Medium   • Type 2 diabetes mellitus without complication, with long-term current use of insulin ([facility]) [E11.9, Z79.4]  Not Applicable     Priority: Medium   • Asthma [J45.909]  Yes     Priority: Medium   • Other hyperlipidemia [E78.49]  Yes     Priority: Low   • JAD (obstructive sleep apnea) [G47.33]  Yes     Priority: Low   • GERD (gastroesophageal reflux disease) [K21.9]  Yes     Priority: Low   • Benign paroxysmal positional vertigo [H81.10]  Yes     Priority: Low      Resolved Hospital Problems    Diagnosis Date Resolved POA   • Hiatal hernia [K44.9] 10/05/2022 Unknown       Morbid Obesity S/P Lap Gastric Sleeve  - less than 24 hours post op ( Connelly)  - defer to bariatric surgery and wound care  - pain and nausea controlled  - close monitor on telemetry    Hypertension  HLD  - currently stable  - resume Norvasc 10 mg, Coreg 12.5 mg, and cozaar 100 mg  - PRN hydralazine    DM2  - glucose stable. Hb A1c 6.2  - hold oral hypoglcemics  - SSI with scheduled accu checks    Asthma  JAD  - continue IS  - duo nebs with additional  pulmonary toilet as needed  - encourage CPAP      Thank you for allowing [facility] Medicine Service to provide consultative care for your patient, we will continue to follow while clinically appropriate.    Electronically signed by Ramirez Rob PA-C, 10/05/22, 7:42 PM EDT.

## 2022-10-05 NOTE — ANESTHESIA PROCEDURE NOTES
"Peripheral Block      Patient reassessed immediately prior to procedure    Patient location during procedure: OR  Reason for block: at surgeon's request and post-op pain management  Performed by  CRNA/CAA: Uma Wilson CRNA  Preanesthetic Checklist  Completed: patient identified, IV checked, site marked, risks and benefits discussed, surgical consent, monitors and equipment checked, pre-op evaluation and timeout performed  Prep:  Pt Position: supine  Sterile barriers:cap, gloves, mask and washed/disinfected hands  Prep: ChloraPrep  Patient monitoring: blood pressure monitoring, continuous pulse oximetry and EKG  Procedure    Sedation: yes  Performed under: general  Guidance:ultrasound guided  Images:still images obtained, printed/placed on chart    Laterality:Bilateral  Block Type:TAP  Injection Technique:single-shot  Needle Type:short-bevel and echogenic  Needle Gauge:20 G  Resistance on Injection: none    Medications Used: dexamethasone sodium phosphate injection, 4 mg  bupivacaine PF (MARCAINE) 0.25 % injection, 60 mL  Med administered at 10/5/2022 7:43 AM      Medications  Comment:Block Injection:  LA dose divided between Right and Left block        Post Assessment  Injection Assessment: negative aspiration for heme, incremental injection and no paresthesia on injection  Patient Tolerance:comfortable throughout block  Complications:no  Additional Notes  Subcostal TAPs  A high-frequency linear transducer, with sterile cover, was placed sub-xiphoid to identify Linea Alba, right and left Rectus Abdominus Muscles (SHANTI). The transducer was moved either right or left subcostally to identify the SHANTI and the Transverse Abdominus Muscle (MARI). The insertion site was prepped in sterile fashion and then localized with 2-5 ml of 1% Lidocaine. Using ultrasound-guidance, a 20-gauge B-Canales 4\" Ultraplex 360 non-stimulating echogenic needle was advanced in plane, from medial to lateral, until the tip of the needle was " "in the fascial plane between the SHANTI and MARI. 1-3ml of preservative free normal saline was used to hydro-dissect the fascial planes. After the fascial plane was verified, the local anesthetic (LA) was injected. The procedure was repeated on the opposite side for bilateral coverage. Aspiration every 5 ml to prevent intravascular injection. Injection was completed with negative aspiration of blood and negative intravascular injection. Injection pressures were normal with minimal resistance. The subcostal approach to the TAP nerve block ideally anesthetizes the intercostal nerves T6-T9.     Mid-Axillary/Lateral TAPs  A high-frequency linear transducer, with sterile cover, was placed in the midaxillary line between the ASIS and costal margin. The External Oblique Muscle (EOM), Internal Oblique Muscle (IOM), Transverse Abdominus Muscle (MARI), and Peritoneum were identified. The insertion site was prepped in sterile fashion and then localized with 2-5 ml of 1% Lidocaine. Using ultrasound-guidance, a 20-gauge B-Canales 4\" Ultraplex 360 non-stimulating echogenic needle was advanced in plane, from medial to lateral, until the tip of the needle was in the fascial plane between the IOM and MARI. 1-3ml of preservative free normal saline was used to hydro-dissect the fascial planes. After the fascial plane was verified, the local anesthetic (LA) was injected. The procedure was repeated on the opposite side for bilateral coverage. Aspiration every 5 ml to prevent intravascular injection. Injection was completed with negative aspiration of blood and negative intravascular injection. Injection pressures were normal with minimal resistance. Midaxillary TAPs should reach intercostal nerves T10- T11 and the subcostal nerve T12.             "

## 2022-10-06 ENCOUNTER — APPOINTMENT (OUTPATIENT)
Dept: GENERAL RADIOLOGY | Facility: HOSPITAL | Age: 57
End: 2022-10-06

## 2022-10-06 LAB
ALBUMIN SERPL-MCNC: 4 G/DL (ref 3.5–5.2)
ALBUMIN/GLOB SERPL: 1.6 G/DL
ALP SERPL-CCNC: 48 U/L (ref 39–117)
ALT SERPL W P-5'-P-CCNC: 108 U/L (ref 1–41)
ANION GAP SERPL CALCULATED.3IONS-SCNC: 11 MMOL/L (ref 5–15)
AST SERPL-CCNC: 92 U/L (ref 1–40)
BASOPHILS # BLD AUTO: 0 10*3/MM3 (ref 0–0.2)
BASOPHILS NFR BLD AUTO: 0 % (ref 0–1.5)
BILIRUB SERPL-MCNC: 0.3 MG/DL (ref 0–1.2)
BUN SERPL-MCNC: 22 MG/DL (ref 6–20)
BUN/CREAT SERPL: 21.6 (ref 7–25)
CALCIUM SPEC-SCNC: 9.1 MG/DL (ref 8.6–10.5)
CHLORIDE SERPL-SCNC: 103 MMOL/L (ref 98–107)
CO2 SERPL-SCNC: 25 MMOL/L (ref 22–29)
CREAT SERPL-MCNC: 1.02 MG/DL (ref 0.76–1.27)
CYTO UR: NORMAL
DEPRECATED RDW RBC AUTO: 43.8 FL (ref 37–54)
EGFRCR SERPLBLD CKD-EPI 2021: 87.3 ML/MIN/1.73
EOSINOPHIL # BLD AUTO: 0 10*3/MM3 (ref 0–0.4)
EOSINOPHIL NFR BLD AUTO: 0 % (ref 0.3–6.2)
ERYTHROCYTE [DISTWIDTH] IN BLOOD BY AUTOMATED COUNT: 15.1 % (ref 12.3–15.4)
GLOBULIN UR ELPH-MCNC: 2.5 GM/DL
GLUCOSE BLDC GLUCOMTR-MCNC: 135 MG/DL (ref 70–130)
GLUCOSE BLDC GLUCOMTR-MCNC: 137 MG/DL (ref 70–130)
GLUCOSE BLDC GLUCOMTR-MCNC: 145 MG/DL (ref 70–130)
GLUCOSE BLDC GLUCOMTR-MCNC: 160 MG/DL (ref 70–130)
GLUCOSE SERPL-MCNC: 157 MG/DL (ref 65–99)
HCT VFR BLD AUTO: 39.5 % (ref 37.5–51)
HGB BLD-MCNC: 12.4 G/DL (ref 13–17.7)
IMM GRANULOCYTES # BLD AUTO: 0.02 10*3/MM3 (ref 0–0.05)
IMM GRANULOCYTES NFR BLD AUTO: 0.2 % (ref 0–0.5)
IRON 24H UR-MRATE: 20 MCG/DL (ref 59–158)
LAB AP CASE REPORT: NORMAL
LAB AP CLINICAL INFORMATION: NORMAL
LYMPHOCYTES # BLD AUTO: 0.97 10*3/MM3 (ref 0.7–3.1)
LYMPHOCYTES NFR BLD AUTO: 11.7 % (ref 19.6–45.3)
MCH RBC QN AUTO: 25.4 PG (ref 26.6–33)
MCHC RBC AUTO-ENTMCNC: 31.4 G/DL (ref 31.5–35.7)
MCV RBC AUTO: 80.8 FL (ref 79–97)
MONOCYTES # BLD AUTO: 0.87 10*3/MM3 (ref 0.1–0.9)
MONOCYTES NFR BLD AUTO: 10.5 % (ref 5–12)
NEUTROPHILS NFR BLD AUTO: 6.45 10*3/MM3 (ref 1.7–7)
NEUTROPHILS NFR BLD AUTO: 77.6 % (ref 42.7–76)
NRBC BLD AUTO-RTO: 0 /100 WBC (ref 0–0.2)
PATH REPORT.FINAL DX SPEC: NORMAL
PATH REPORT.GROSS SPEC: NORMAL
PLATELET # BLD AUTO: 218 10*3/MM3 (ref 140–450)
PMV BLD AUTO: 9.4 FL (ref 6–12)
POTASSIUM SERPL-SCNC: 4.9 MMOL/L (ref 3.5–5.2)
PROT SERPL-MCNC: 6.5 G/DL (ref 6–8.5)
RBC # BLD AUTO: 4.89 10*6/MM3 (ref 4.14–5.8)
SODIUM SERPL-SCNC: 139 MMOL/L (ref 136–145)
WBC NRBC COR # BLD: 8.31 10*3/MM3 (ref 3.4–10.8)

## 2022-10-06 PROCEDURE — 25010000002 HYDRALAZINE PER 20 MG: Performed by: SURGERY

## 2022-10-06 PROCEDURE — 83540 ASSAY OF IRON: CPT | Performed by: SURGERY

## 2022-10-06 PROCEDURE — 85025 COMPLETE CBC W/AUTO DIFF WBC: CPT | Performed by: SURGERY

## 2022-10-06 PROCEDURE — 97161 PT EVAL LOW COMPLEX 20 MIN: CPT

## 2022-10-06 PROCEDURE — 25010000002 ENOXAPARIN PER 10 MG: Performed by: SURGERY

## 2022-10-06 PROCEDURE — 25010000002 CYANOCOBALAMIN PER 1000 MCG: Performed by: SURGERY

## 2022-10-06 PROCEDURE — 74240 X-RAY XM UPR GI TRC 1CNTRST: CPT

## 2022-10-06 PROCEDURE — 25010000002 CEFAZOLIN PER 500 MG: Performed by: SURGERY

## 2022-10-06 PROCEDURE — 99233 SBSQ HOSP IP/OBS HIGH 50: CPT | Performed by: INTERNAL MEDICINE

## 2022-10-06 PROCEDURE — 63710000001 INSULIN LISPRO (HUMAN) PER 5 UNITS: Performed by: SURGERY

## 2022-10-06 PROCEDURE — 80053 COMPREHEN METABOLIC PANEL: CPT | Performed by: SURGERY

## 2022-10-06 PROCEDURE — 25010000002 NA FERRIC GLUC CPLX PER 12.5 MG: Performed by: SURGERY

## 2022-10-06 PROCEDURE — 0 DIATRIZOATE MEGLUMINE & SODIUM PER 1 ML

## 2022-10-06 PROCEDURE — 0 POTASSIUM CHLORIDE PER 2 MEQ: Performed by: SURGERY

## 2022-10-06 PROCEDURE — 99024 POSTOP FOLLOW-UP VISIT: CPT | Performed by: SURGERY

## 2022-10-06 PROCEDURE — 97116 GAIT TRAINING THERAPY: CPT

## 2022-10-06 PROCEDURE — 82962 GLUCOSE BLOOD TEST: CPT

## 2022-10-06 RX ADMIN — CYANOCOBALAMIN 1000 MCG: 1000 INJECTION, SOLUTION INTRAMUSCULAR; SUBCUTANEOUS at 08:40

## 2022-10-06 RX ADMIN — POTASSIUM CHLORIDE AND SODIUM CHLORIDE 125 ML/HR: 450; 150 INJECTION, SOLUTION INTRAVENOUS at 20:38

## 2022-10-06 RX ADMIN — CEFAZOLIN 3 G: 10 INJECTION, POWDER, FOR SOLUTION INTRAVENOUS at 00:48

## 2022-10-06 RX ADMIN — PANTOPRAZOLE SODIUM 40 MG: 40 INJECTION, POWDER, FOR SOLUTION INTRAVENOUS at 05:17

## 2022-10-06 RX ADMIN — ENOXAPARIN SODIUM 40 MG: 40 INJECTION SUBCUTANEOUS at 20:37

## 2022-10-06 RX ADMIN — AMLODIPINE BESYLATE 10 MG: 10 TABLET ORAL at 08:40

## 2022-10-06 RX ADMIN — GABAPENTIN 100 MG: 100 CAPSULE ORAL at 08:40

## 2022-10-06 RX ADMIN — ENOXAPARIN SODIUM 40 MG: 40 INJECTION SUBCUTANEOUS at 08:40

## 2022-10-06 RX ADMIN — ACETAMINOPHEN 1000 MG: 500 TABLET, FILM COATED ORAL at 21:20

## 2022-10-06 RX ADMIN — SODIUM CHLORIDE 125 MG: 9 INJECTION, SOLUTION INTRAVENOUS at 11:18

## 2022-10-06 RX ADMIN — CARVEDILOL 12.5 MG: 12.5 TABLET, FILM COATED ORAL at 20:38

## 2022-10-06 RX ADMIN — GABAPENTIN 100 MG: 100 CAPSULE ORAL at 20:38

## 2022-10-06 RX ADMIN — HYDROMORPHONE HYDROCHLORIDE 2 MG: 2 TABLET ORAL at 12:17

## 2022-10-06 RX ADMIN — HYDRALAZINE HYDROCHLORIDE 10 MG: 20 INJECTION INTRAMUSCULAR; INTRAVENOUS at 13:48

## 2022-10-06 RX ADMIN — LOSARTAN POTASSIUM 100 MG: 50 TABLET, FILM COATED ORAL at 08:40

## 2022-10-06 RX ADMIN — CARVEDILOL 12.5 MG: 12.5 TABLET, FILM COATED ORAL at 08:40

## 2022-10-06 RX ADMIN — ACETAMINOPHEN 1000 MG: 500 TABLET, FILM COATED ORAL at 05:17

## 2022-10-06 RX ADMIN — SIMETHICONE 80 MG: 80 TABLET, CHEWABLE ORAL at 12:17

## 2022-10-06 RX ADMIN — INSULIN LISPRO 2 UNITS: 100 INJECTION, SOLUTION INTRAVENOUS; SUBCUTANEOUS at 12:17

## 2022-10-06 RX ADMIN — GABAPENTIN 100 MG: 100 CAPSULE ORAL at 15:05

## 2022-10-06 RX ADMIN — ACETAMINOPHEN 1000 MG: 500 TABLET, FILM COATED ORAL at 15:05

## 2022-10-06 RX ADMIN — ROSUVASTATIN CALCIUM 10 MG: 10 TABLET, FILM COATED ORAL at 08:40

## 2022-10-06 NOTE — PLAN OF CARE
Problem: Adult Inpatient Plan of Care  Goal: Plan of Care Review  Outcome: Ongoing, Progressing  Flowsheets (Taken 10/6/2022 1743)  Outcome Evaluation: VSS, RA-2LNC when asleep. Pt ambulating in the halls and tolerating protein shakes. PCA in place. One dose of hydralazine given for HTN-improved. IS use and ambulation encouraged   Goal Outcome Evaluation:              Outcome Evaluation: VSS, RA-2LNC when asleep. Pt ambulating in the halls and tolerating protein shakes. PCA in place. One dose of hydralazine given for HTN-improved. IS use and ambulation encouraged

## 2022-10-06 NOTE — PROGRESS NOTES
[facility] Medicine Services  PROGRESS NOTE    Patient Name: Von Rodríguez  : 1965  MRN: [id number]    Date of Admission: 10/5/2022  Primary Care Physician: Provider, No Known    Subjective   Subjective     CC:  Med management post op sleeve     HPI:  Patient very pleasant and doing well. Was ambulating in the hallway earlier. On PCA but reports good pain control. BP labile but last readings improved. No other complaints at this time     ROS:  Gen- No fevers, chills  CV- No chest pain, palpitations  Resp- No cough, dyspnea  GI- No N/V/D, abd pain        Objective   Objective     Vital Signs:   Temp:  [97.1 °F (36.2 °C)-98.5 °F (36.9 °C)] 98.5 °F (36.9 °C)  Heart Rate:  [] 116  Resp:  [14-18] 18  BP: (126-183)/() 126/99  Flow (L/min):  [6-15] 6     Physical Exam:  GEN: NAD, resting in chair, BMI 57  HEENT: on room air, atraumatic, normocephalic  NECK: supple, no masses  RESP: on room air, normal effort  CV: on tele, sinus rhythm  PSYCH: normal affect, appropriate  NEURO: awake, alert, no focal deficits noted  MSK: no edema noted  SKIN: no rashes noted       Results Reviewed:  LAB RESULTS:      Lab 10/06/22  0725 10/05/22  1913 09/30/22  0937   WBC 8.31  --  4.56   HEMOGLOBIN 12.4* 13.5 13.4   HEMATOCRIT 39.5 43.6 42.0   PLATELETS 218  --  215   NEUTROS ABS 6.45  --   --    IMMATURE GRANS (ABS) 0.02  --   --    LYMPHS ABS 0.97  --   --    MONOS ABS 0.87  --   --    EOS ABS 0.00  --   --    MCV 80.8  --  79.2         Lab 10/06/22  0725 22  0937   SODIUM 139  --    POTASSIUM 4.9 4.0   CHLORIDE 103  --    CO2 25.0  --    ANION GAP 11.0  --    BUN 22*  --    CREATININE 1.02  --    EGFR 87.3  --    GLUCOSE 157*  --    CALCIUM 9.1  --    HEMOGLOBIN A1C  --  6.20*         Lab 10/06/22  0725   TOTAL PROTEIN 6.5   ALBUMIN 4.00   GLOBULIN 2.5   ALT (SGPT) 108*   AST (SGOT) 92*   BILIRUBIN 0.3   ALK PHOS 48                 Lab 10/06/22  0725 10/05/22  1015   IRON 20*  --     ABO TYPING  --  AB   RH TYPING  --  Positive   ANTIBODY SCREEN  --  Negative         Brief Urine Lab Results     None          Microbiology Results Abnormal     None          Peripheral Block    Result Date: 10/5/2022  Wilfredo Pak CRNA     10/5/2022  7:44 AM Peripheral Block Patient reassessed immediately prior to procedure Patient location during procedure: OR Reason for block: at surgeon's request and post-op pain management Performed by CRNA/CAA: Uma Wilson CRNA Preanesthetic Checklist Completed: patient identified, IV checked, site marked, risks and benefits discussed, surgical consent, monitors and equipment checked, pre-op evaluation and timeout performed Prep: Pt Position: supine Sterile barriers:cap, gloves, mask and washed/disinfected hands Prep: ChloraPrep Patient monitoring: blood pressure monitoring, continuous pulse oximetry and EKG Procedure Sedation: yes Performed under: general Guidance:ultrasound guided Images:still images obtained, printed/placed on chart Laterality:Bilateral Block Type:TAP Injection Technique:single-shot Needle Type:short-bevel and echogenic Needle Gauge:20 G Resistance on Injection: none Medications Used: dexamethasone sodium phosphate injection, 4 mg bupivacaine PF (MARCAINE) 0.25 % injection, 60 mL Med administered at 10/5/2022 7:43 AM Medications Comment:Block Injection:  LA dose divided between Right and Left block Post Assessment Injection Assessment: negative aspiration for heme, incremental injection and no paresthesia on injection Patient Tolerance:comfortable throughout block Complications:no Additional Notes Subcostal TAPs A high-frequency linear transducer, with sterile cover, was placed sub-xiphoid to identify [place], right and left Rectus Abdominus Muscles (SHANTI). The transducer was moved either right or left subcostally to identify the SHANTI and the Transverse Abdominus Muscle (MARI). The insertion site was prepped in sterile fashion and then  "localized with 2-5 ml of 1% Lidocaine. Using ultrasound-guidance, a 20-gauge B-Canales 4\" Ultraplex 360 non-stimulating echogenic needle was advanced in plane, from medial to lateral, until the tip of the needle was in the fascial plane between the SHANTI and MARI. 1-3ml of preservative free normal saline was used to hydro-dissect the fascial planes. After the fascial plane was verified, the local anesthetic (LA) was injected. The procedure was repeated on the opposite side for bilateral coverage. Aspiration every 5 ml to prevent intravascular injection. Injection was completed with negative aspiration of blood and negative intravascular injection. Injection pressures were normal with minimal resistance. The subcostal approach to the TAP nerve block ideally anesthetizes the intercostal nerves T6-T9. Mid-Axillary/Lateral TAPs A high-frequency linear transducer, with sterile cover, was placed in the midaxillary line between the ASIS and costal margin. The External Oblique Muscle (EOM), Internal Oblique Muscle (IOM), Transverse Abdominus Muscle (MARI), and Peritoneum were identified. The insertion site was prepped in sterile fashion and then localized with 2-5 ml of 1% Lidocaine. Using ultrasound-guidance, a 20-gauge B-Canales 4\" Ultraplex 360 non-stimulating echogenic needle was advanced in plane, from medial to lateral, until the tip of the needle was in the fascial plane between the IOM and MARI. 1-3ml of preservative free normal saline was used to hydro-dissect the fascial planes. After the fascial plane was verified, the local anesthetic (LA) was injected. The procedure was repeated on the opposite side for bilateral coverage. Aspiration every 5 ml to prevent intravascular injection. Injection was completed with negative aspiration of blood and negative intravascular injection. Injection pressures were normal with minimal resistance. Midaxillary TAPs should reach intercostal nerves T10- T11 and the subcostal nerve T12. "     XR Lost Needle / Instrument    Result Date: 10/5/2022  DATE OF EXAM: 10/5/2022 10:37 AM  PROCEDURE: XR LOST NEEDLE/INSTRUMENT-  INDICATIONS: no count ; K44.9-Diaphragmatic hernia without obstruction or gangrene; Z68.43-Body mass index (BMI) 50.0-59.9, adult  COMPARISON: No comparisons available.  TECHNIQUE: A total of three portable AP images to include the abdomen and pelvis.  FINDINGS: History indicates no preprocedure count. RIP drain is seen in the upper abdominal midline. There are multiple skin staples. Various lines are superimposed lateral to the right chest and abdomen. No metallic foreign body is seen to suggest retained needle or instrument. Bowel gas pattern is nonobstructive.      Impression: No evidence of retained needle or instrument.  This report was finalized on 10/5/2022 10:03 PM by Dr. Lit Mcgowan MD.            I have reviewed the medications:  Scheduled Meds:acetaminophen, 1,000 mg, Oral, Q8H   Or  acetaminophen, 1,000 mg, Oral, Q8H  amLODIPine, 10 mg, Oral, Q24H  carvedilol, 12.5 mg, Oral, Q12H  enoxaparin, 40 mg, Subcutaneous, Q12H  gabapentin, 100 mg, Oral, TID   Or  gabapentin, 100 mg, Oral, TID  insulin lispro, 0-9 Units, Subcutaneous, 4x Daily AC & at Bedtime  losartan, 100 mg, Oral, Q24H  pantoprazole, 40 mg, Intravenous, Q AM  rosuvastatin, 10 mg, Oral, Daily  IV Fluids 1000 mL + additives, 100 mL/hr, Intravenous, Once      Continuous Infusions:HYDROmorphone HCl-NaCl,   lactated ringers, 150 mL/hr, Last Rate: 150 mL/hr (10/05/22 1613)  sodium chloride 0.45 % with KCl 20 mEq, 125 mL/hr, Last Rate: 125 mL/hr (10/06/22 0840)      PRN Meds:.Albuterol Sulfate NEB Orderable  •  ALPRAZolam  •  diphenhydrAMINE  •  ferric gluconate  •  hydrALAZINE  •  HYDROmorphone **AND** naloxone  •  HYDROmorphone  •  LORazepam  •  LORazepam  •  Morphine **AND** naloxone  •  naloxone  •  ondansetron **FOLLOWED BY** [START ON 10/9/2022] ondansetron  •  ondansetron  •  oxyCODONE  •  phenol  •   prochlorperazine  •  promethazine  •  simethicone    Assessment & Plan   Assessment & Plan     Active Hospital Problems    Diagnosis  POA   • **Morbid obesity with body mass index (BMI) of 50.0 to 59.9 in adult ([facility]) [E66.01, Z68.43]  Not Applicable   • Hypertension [I10]  Yes   • Other hyperlipidemia [E78.49]  Yes   • JAD (obstructive sleep apnea) [G47.33]  Yes   • Type 2 diabetes mellitus without complication, with long-term current use of insulin ([facility]) [E11.9, Z79.4]  Not Applicable   • GERD (gastroesophageal reflux disease) [K21.9]  Yes   • Benign paroxysmal positional vertigo [H81.10]  Yes   • Asthma [J45.909]  Yes      Resolved Hospital Problems    Diagnosis Date Resolved POA   • Hiatal hernia [K44.9] 10/05/2022 Unknown        Brief Hospital Course to date:  Von Rodríguez is a 56 y.o. male with a past medical history significant for hypertension, HLD, DM2, BPPV, GERD, asthma, JAD, and morbid obesity. Presents as a consult from Dr. Connelly. He is less than 24 hours s/p laparoscopic gastric sleeve and EGD. He is stable. Tolerated procedure well. Pain is controlled with PCA. Nurse is reporting oxygenation does drop mildly while patient is sleeping. He has worn CPAP remotely but wishes to avoid it if possible. Reports he still feels groggy from anesthesia, but otherwise no complaints. No fever, cough, congestion, SOB, or chest pain. No headache or focal weakness/paratehesias. Blood pressure has been elevated slightly. Antihypertensives resumed by bariatric service. Will continue to follow for medical management    Morbid Obesity S/P Lap Gastric Sleeve  - POD1 ( Maricel), doing well   - defer to bariatric surgery and wound care  - pain and nausea controlled  - close monitor on telemetry     Hypertension  HLD  - currently stable though has had some labile readings in past 24h. No changes currently as last readings 120s/90s  - resume Norvasc 10 mg, Coreg 12.5 mg, and cozaar 100 mg  - PRN hydralazine  - has room to  uptitrate coreg if needed      DM2  - glucose stable. Hb A1c 6.2  - hold oral hypoglcemics  - SSI with scheduled accu checks     Asthma  JAD  - continue IS  - duo nebs with additional pulmonary toilet as needed- on RA during visit this AM  - encourage CPAP        Thank you for allowing [facility] Medicine Service to provide consultative care for your patient, we will continue to follow while clinically appropriate.     Junie Reece MD  10/06/22

## 2022-10-06 NOTE — PROGRESS NOTES
"Cc: POD#1  Lap to open SG  \"feel ok\"    He is alone in the room.  He feels pretty good other than wanting to pass some gas.  He says the block worked very well and he does not have much pain.  He says he is ambulating and voiding well.  No pulmonary complaints, spirometer greater than 2000 witnessed.  Room air currently.  No flatus or bowel movement.  Tolerating his diet without nausea.    No fever pulse 97 respirations 18 blood pressure 141/97 but as high as 183/116 yesterday.  Saturation 93%    - 40 SS is no apparent distress.  Abdomen soft and appropriate.  Dressings dry.    CMP normal except glucose of 157 BUN of 22  AST 92.  Iron is 20.  White count 8.31 with 78 segs 12 lymphs 11 monocytes no bands.  H&H 12.4 and 39.5 with microcytic indices.  H&H last p.m. 13.5 and 43.6.  Preoperative H&H 13.4 and 42.  Hemoglobin A1c 6.20.  Upper GI images and report reviewed, unremarkable.    Impression: Postop day #1 laparoscopic converted to open sleeve gastrectomy.  No signs of active bleeding on Lovenox.  Iron deficiency anemia without evidence of bleeding on Lovenox.  Insulin-dependent diabetes.  Elevated transaminases likely related to fatty liver and retraction during surgery, less likely shock liver.  Hypertension improved, appreciate hospitalist assistance.    Plan: Continue liquids, out of bed, pulmonary toilet, VTE prophylaxis.  IV iron.  Recheck labs in the morning.  Remove dressings tomorrow.  See orders.  "

## 2022-10-06 NOTE — THERAPY DISCHARGE NOTE
Patient Name: Von Rodríguez  : 1965    MRN: [id number]                              Today's Date: 10/6/2022       Admit Date: 10/5/2022    Visit Dx:     ICD-10-CM ICD-9-CM   1. Hiatal hernia  K44.9 553.3   2. Body mass index (BMI) of 50-59.9 in adult ([facility])  Z68.43 V85.43     Patient Active Problem List   Diagnosis   • Asthma   • Hypertension   • Other hyperlipidemia   • JAD (obstructive sleep apnea)   • GERD (gastroesophageal reflux disease)   • S/P cholecystectomy   • Back pain   • Benign paroxysmal positional vertigo   • Type 2 diabetes mellitus without complication, with long-term current use of insulin ([facility])   • Morbid obesity with body mass index (BMI) of 50.0 to 59.9 in adult ([facility])     Past Medical History:   Diagnosis Date   • Abnormal PFT     mild obstruction   • Asthma     childhood; does not have inhaler   • Back pain     chronic; no meds; no steroids   • Benign paroxysmal positional vertigo    • Candidal intertrigo    • Cornea transplant recipient     left only   • Elevated alanine aminotransferase (ALT) level    • Heartburn     EGD May 2021; no hx of h pylori; symptoms well controlled on omeprazole   • Helicobacter pylori ab+     took prevpak    • Hiatal hernia with gastroesophageal reflux     EGD  Debra Tucker MD   • Hypertension    • Incisional hernia     s/p attempted lap RNY RUQ incision   • Microcytic erythrocytes    • JAD (obstructive sleep apnea)     s/p uvulaplasty   • Other hyperlipidemia    • S/P cholecystectomy     lap; gallstones   • Type 2 diabetes mellitus without complication, with long-term current use of insulin ([facility])     dx in 2018; on insulin since diagnosis; last A1c 6.2   • Wears glasses      Past Surgical History:   Procedure Laterality Date   • COLONOSCOPY     • CORNEAL TRANSPLANT Left    • DIAGNOSTIC LAPAROSCOPY      aborted RNY gastric bypass due to high trocar torque. Dr. Gregory Rodriguez [facility]. Complicated by incisional hernia   • ENDOSCOPY     • ENDOSCOPY N/A  10/5/2022    Procedure: ESOPHAGOGASTRODUODENOSCOPY;  Surgeon: Pete Connelly MD;  Location:  [facility];  Service: Bariatric;  Laterality: N/A;   • EXPLORATORY LAPAROTOMY N/A 10/5/2022    Procedure: LAPAROTOMY EXPLORATORY;  Surgeon: Pete Connelly MD;  Location:  [facility];  Service: Bariatric;  Laterality: N/A;   • GASTRIC SLEEVE LAPAROSCOPIC N/A 10/5/2022    Procedure: GASTRIC SLEEVE LAPAROSCOPIC;  Surgeon: Pete Connelly MD;  Location:  [facility];  Service: Bariatric;  Laterality: N/A;   • LAPAROSCOPIC CHOLECYSTECTOMY  1993    gallstones   • TONSILLECTOMY AND ADENOIDECTOMY  2007   • UVULOPLASTY      at time tonsils 2007 for JAD   • WISDOM TOOTH EXTRACTION      all removed      General Information     Row Name 10/06/22 1147          Physical Therapy Time and Intention    Document Type discharge evaluation/summary  -AE     Mode of Treatment physical therapy  -AE     Row Name 10/06/22 1147          General Information    Patient Profile Reviewed yes  -AE     Prior Level of Function independent:;all household mobility;gait;transfer;bed mobility;ADL's;dressing;bathing  -AE     Existing Precautions/Restrictions fall;other (see comments)  back spasms with increased ambulation  -AE     Barriers to Rehab medically complex;physical barrier  -AE     Row Name 10/06/22 1147          Living Environment    People in Home significant other  -AE     Row Name 10/06/22 1147          Home Main Entrance    Number of Stairs, Main Entrance one  -AE     Stair Railings, Main Entrance none  -AE     Row Name 10/06/22 1147          Stairs Within Home, Primary    Stairs, Within Home, Primary Flight to bedrooms  -AE     Number of Stairs, Within Home, Primary --  6+6 with landing in between  -AE     Stair Railings, Within Home, Primary railing on left side (ascending);railing on right side (ascending)  First 6 steps R handrail, last 6 steps L handrail  -AE     Row Name 10/06/22 1147          Cognition    Orientation Status  (Cognition) oriented x 4  -AE     Row Name 10/06/22 1147          Safety Issues, Functional Mobility    Safety Issues Affecting Function (Mobility) awareness of need for assistance;insight into deficits/self-awareness;safety precaution awareness  -AE     Impairments Affecting Function (Mobility) endurance/activity tolerance;shortness of breath;pain  -AE           User Key  (r) = Recorded By, (t) = Taken By, (c) = Cosigned By    Initials Name Provider Type    AE Landon Downey, PT Physical Therapist               Mobility     Row Name 10/06/22 1149          Bed Mobility    Comment, (Bed Mobility) Pt received and left UIC.  -AE     Row Name 10/06/22 1149          Transfers    Comment, (Transfers) VCs for hand placement and sequencing. Pt demo good safety awareness and understands limitations of mobility.  -AE     Row Name 10/06/22 1149          Sit-Stand Transfer    Sit-Stand [place] (Transfers) contact guard;verbal cues  -AE     Row Name 10/06/22 1149          Gait/Stairs (Locomotion)    [place] Level (Gait) contact guard;1 person assist  progressing to SBA  -AE     Distance in Feet (Gait) 450  -AE     Deviations/Abnormal Patterns (Gait) bilateral deviations;base of support, wide;marjan decreased;gait speed decreased  -AE     Comment, (Gait/Stairs) Pt demo step through gait pattern with slowed marjan, decreased gait speed, and wide LILIANE. Pt reports this is baseline mobility and he does not ambulate further than this at home. Pt also completed stair training of 3 steps with L hand rail, step to step pattern, further training limited by IV pole.  -AE           User Key  (r) = Recorded By, (t) = Taken By, (c) = Cosigned By    Initials Name Provider Type    AE Landon Downey, PT Physical Therapist               Obj/Interventions     Row Name 10/06/22 1155          Range of Motion Comprehensive    General Range of Motion bilateral lower extremity ROM WFL  -AE     Row Name 10/06/22 1155          Strength  Comprehensive (MMT)    General Manual Muscle Testing (MMT) Assessment lower extremity strength deficits identified  -AE     Comment, General Manual Muscle Testing (MMT) Assessment BLE grossly 4/5  -AE     Row Name 10/06/22 1155          Balance    Balance Assessment sitting static balance;sitting dynamic balance;sit to stand dynamic balance;standing static balance;standing dynamic balance  -AE     Static Sitting Balance independent  -AE     Dynamic Sitting Balance standby assist  -AE     Position, Sitting Balance unsupported;sitting in chair  -AE     Sit to Stand Dynamic Balance contact guard  -AE     Static Standing Balance contact guard  -AE     Dynamic Standing Balance contact guard  -AE     Position/Device Used, Standing Balance supported  RUE pushing IV pole  -AE     Row Name 10/06/22 1155          Sensory Assessment (Somatosensory)    Sensory Assessment (Somatosensory) LE sensation intact  -AE           User Key  (r) = Recorded By, (t) = Taken By, (c) = Cosigned By    Initials Name Provider Type    AE Landon Downey, CHRISTINE Physical Therapist               Goals/Plan    No documentation.                Clinical Impression     Row Name 10/06/22 1151          Pain    Pretreatment Pain Rating 3/10  -AE     Posttreatment Pain Rating 5/10  -AE     Pain Location lower  -AE     Pain Location - back  -AE     Pre/Posttreatment Pain Comment pt c/o spasms in lower back limiting further mobility  -AE     Pain Intervention(s) Repositioned;Ambulation/increased activity  -AE     Row Name 10/06/22 1157          Plan of Care Review    Plan of Care Reviewed With patient  -AE     Progress no change  -AE     Outcome Evaluation PT eval completed. Pt presents with decreased functional mobility and decreased endurance with activity. Pt ambulated 450ft with CGA progressing to SBA. Pt also completed stair training of 3 steps, step to step gait pattern, further training limited by IV line length. Pt demo good mobility overall with good  safety awareness. Pt already ambulating in hallways, does not demo need for skilled IP PT interventions at this time, will sign off. Recommend D/C home with assist.  -AE     Row Name 10/06/22 1157          Therapy Assessment/Plan (PT)    Patient/Family Therapy Goals Statement (PT) Return home  -AE     Criteria for Skilled Interventions Met (PT) no;does not meet criteria for skilled intervention  -AE     Therapy Frequency (PT) evaluation only  -AE     Row Name 10/06/22 1157          Vital Signs    Pre Systolic BP Rehab 128  -AE     Pre Treatment Diastolic   -AE     Pretreatment Heart Rate (beats/min) 96  -AE     Intratreatment Heart Rate (beats/min) 123  -AE     Posttreatment Heart Rate (beats/min) 103  -AE     Pre SpO2 (%) 94  -AE     O2 Delivery Pre Treatment room air  -AE     O2 Delivery Intra Treatment room air  -AE     Post SpO2 (%) 96  -AE     O2 Delivery Post Treatment room air  -AE     Pre Patient Position Sitting  -AE     Intra Patient Position Standing  -AE     Post Patient Position Sitting  -AE     Row Name 10/06/22 1157          Positioning and Restraints    Pre-Treatment Position sitting in chair/recliner  -AE     Post Treatment Position chair  -AE     In Chair notified nsg;sitting;call light within reach;encouraged to call for assist;with family/caregiver  -AE           User Key  (r) = Recorded By, (t) = Taken By, (c) = Cosigned By    Initials Name Provider Type    AE Landon Downey, PT Physical Therapist               Outcome Measures     Row Name 10/06/22 1202 10/06/22 0800       How much help from another person do you currently need...    Turning from your back to your side while in flat bed without using bedrails? 4  -AE 4  -EN    Moving from lying on back to sitting on the side of a flat bed without bedrails? 4  -AE 4  -EN    Moving to and from a bed to a chair (including a wheelchair)? 3  -AE 3  -EN    Standing up from a chair using your arms (e.g., wheelchair, bedside chair)? 3  -AE 3   -EN    Climbing 3-5 steps with a railing? 3  -AE 3  -EN    To walk in hospital room? 3  -AE 3  -EN    AM-PAC 6 Clicks Score (PT) 20  -AE 20  -EN    Highest level of mobility 6 --> Walked 10 steps or more  -AE 6 --> Walked 10 steps or more  -EN    Row Name 10/06/22 1202          Functional Assessment    Outcome Measure Options AM-PAC 6 Clicks Basic Mobility (PT)  -AE           User Key  (r) = Recorded By, (t) = Taken By, (c) = Cosigned By    Initials Name Provider Type    EN Mary Pérez RN Registered Nurse    Landon Restrepo PT Physical Therapist              Physical Therapy Education                 Title: PT OT SLP Therapies (In Progress)     Topic: Physical Therapy (In Progress)     Point: Mobility training (Done)     Learning Progress Summary           Patient Acceptance, E, VU by AE at 10/6/2022 1120                   Point: Home exercise program (Not Started)     Learner Progress:  Not documented in this visit.          Point: Body mechanics (Done)     Learning Progress Summary           Patient Acceptance, E, VU by AE at 10/6/2022 1120                   Point: Precautions (Done)     Learning Progress Summary           Patient Acceptance, E, VU by AE at 10/6/2022 1120                               User Key     Initials Effective Dates Name Provider Type Discipline    AE 09/21/21 -  Landon Downey PT Physical Therapist PT              PT Recommendation and Plan     Plan of Care Reviewed With: patient  Progress: no change  Outcome Evaluation: PT eval completed. Pt presents with decreased functional mobility and decreased endurance with activity. Pt ambulated 450ft with CGA progressing to SBA. Pt also completed stair training of 3 steps, step to step gait pattern, further training limited by IV line length. Pt demo good mobility overall with good safety awareness. Pt already ambulating in hallways, does not demo need for skilled IP PT interventions at this time, will sign off. Recommend D/C home  with assist.     Time Calculation:    PT Charges     Row Name 10/06/22 1203             Time Calculation    Start Time 1120  -AE      PT Received On 10/06/22  -AE              Time Calculation- PT    Total Timed Code Minutes- PT 9 minute(s)  -AE              Timed Charges    75886 - Gait Training Minutes  9  -AE              Untimed Charges    PT Eval/Re-eval Minutes 31  -AE              Total Minutes    Timed Charges Total Minutes 9  -AE      Untimed Charges Total Minutes 31  -AE       Total Minutes 40  -AE            User Key  (r) = Recorded By, (t) = Taken By, (c) = Cosigned By    Initials Name Provider Type    AE Landon Downey, PT Physical Therapist              Therapy Charges for Today     Code Description Service Date Service Provider Modifiers Qty    30522526205 HC GAIT TRAINING EA 15 MIN 10/6/2022 Landon Downey, PT GP 1    97311162392 HC PT EVAL LOW COMPLEXITY 3 10/6/2022 Landon Downey, PT GP 1          PT G-Codes  Outcome Measure Options: AM-PAC 6 Clicks Basic Mobility (PT)  AM-PAC 6 Clicks Score (PT): 20    PT Discharge Summary  Anticipated Discharge Disposition (PT): home with assist    Landon Downey PT  10/6/2022

## 2022-10-06 NOTE — PLAN OF CARE
Goal Outcome Evaluation:           Progress: improving  Outcome Evaluation: Patient has had a decent shift, sleeping on and off tonight. VSS, and patient has been alert and oriented times four. Patient has been utilizing his dilaudid PCA pump tonight. Patient has only walked to the bathroom tonight. Nursing staff will continue to monitor and assess the patient.

## 2022-10-06 NOTE — CASE MANAGEMENT/SOCIAL WORK
Discharge Planning Assessment  [facility]     Patient Name: Von Rodríguez  MRN: [id number]  Today's Date: 10/6/2022    Admit Date: 10/5/2022    Plan: IDP   Discharge Needs Assessment     Row Name 10/06/22 1419       Living Environment    People in Home significant other    Name(s) of People in Home Renetta Lance    Current Living Arrangements home    Potentially Unsafe Housing Conditions unable to assess    Primary Care Provided by self    Provides Primary Care For no one    Family Caregiver if Needed significant other    Quality of Family Relationships involved;helpful    Able to Return to Prior Arrangements yes       Resource/Environmental Concerns    Resource/Environmental Concerns none    Transportation Concerns none       Transition Planning    Patient/Family Anticipates Transition to home    Patient/Family Anticipated Services at Transition none    Transportation Anticipated family or friend will provide       Discharge Needs Assessment    Readmission Within the Last 30 Days no previous admission in last 30 days    Equipment Currently Used at Home none    Concerns to be Addressed no discharge needs identified    Equipment Needed After Discharge other (see comments)  TBD    Discharge Facility/Level of Care Needs other (see comments)  TBD               Discharge Plan     Row Name 10/06/22 8624       Plan    Plan IDP    Plan Comments MSW met with pt. and his S.O. Renetta at bedside. Pt. lives with his S.O. in [place] [place]. Pt. reports that he will find his own PCP and that he is moving soon. Pt.’s pharmacy is [facility]. Pt.’s insurance is [employer]. Pt. reports that S.O. assists with ADL’s/IADL’s as needed. Pt. reports he is independent with his bathing. Pt. denies DME, O2, or HH services currently. Pt. reports that he has transportation back home when he is medically ready to d/c. Pt. does not have an advanced directive or HCS documentation on file. CM will continue to follow pt. throughout his stay.     Final Discharge Disposition Code 30 - still a patient              Continued Care and Services - Admitted Since 10/5/2022    Coordination has not been started for this encounter.          Demographic Summary     Row Name 10/06/22 1418       General Information    Admission Type inpatient    Arrived From home    Referral Source admission list    Reason for Consult discharge planning    Preferred Language English               Functional Status     Row Name 10/06/22 1418       Functional Status, IADL    Medications independent    Meal Preparation assistive person    Housekeeping assistive person    Laundry assistive person    Shopping assistive person       Mental Status Summary    Recent Changes in Mental Status/Cognitive Functioning unable to assess       Employment/    Employment Status self-employed               Psychosocial    No documentation.                Abuse/Neglect    No documentation.                Legal    No documentation.                Substance Abuse    No documentation.                Patient Forms    No documentation.                   ARNAV Mccormick

## 2022-10-06 NOTE — PLAN OF CARE
Goal Outcome Evaluation:  Plan of Care Reviewed With: patient        Progress: no change  Outcome Evaluation: PT eval completed. Pt presents with decreased functional mobility and decreased endurance with activity. Pt ambulated 450ft with CGA progressing to SBA. Pt also completed stair training of 3 steps, step to step gait pattern, further training limited by IV line length. Pt demo good mobility overall with good safety awareness. Pt already ambulating in hallways, does not demo need for skilled IP PT interventions at this time, will sign off. Recommend D/C home with assist.

## 2022-10-07 LAB
ALBUMIN SERPL-MCNC: 3.5 G/DL (ref 3.5–5.2)
ALBUMIN/GLOB SERPL: 1.3 G/DL
ALP SERPL-CCNC: 53 U/L (ref 39–117)
ALT SERPL W P-5'-P-CCNC: 88 U/L (ref 1–41)
ANION GAP SERPL CALCULATED.3IONS-SCNC: 11 MMOL/L (ref 5–15)
AST SERPL-CCNC: 83 U/L (ref 1–40)
BASOPHILS # BLD AUTO: 0.01 10*3/MM3 (ref 0–0.2)
BASOPHILS NFR BLD AUTO: 0.1 % (ref 0–1.5)
BILIRUB SERPL-MCNC: 0.4 MG/DL (ref 0–1.2)
BUN SERPL-MCNC: 18 MG/DL (ref 6–20)
BUN/CREAT SERPL: 22.8 (ref 7–25)
CALCIUM SPEC-SCNC: 9 MG/DL (ref 8.6–10.5)
CHLORIDE SERPL-SCNC: 104 MMOL/L (ref 98–107)
CO2 SERPL-SCNC: 23 MMOL/L (ref 22–29)
CREAT SERPL-MCNC: 0.79 MG/DL (ref 0.76–1.27)
DEPRECATED RDW RBC AUTO: 45.7 FL (ref 37–54)
EGFRCR SERPLBLD CKD-EPI 2021: 105.6 ML/MIN/1.73
EOSINOPHIL # BLD AUTO: 0.01 10*3/MM3 (ref 0–0.4)
EOSINOPHIL NFR BLD AUTO: 0.1 % (ref 0.3–6.2)
ERYTHROCYTE [DISTWIDTH] IN BLOOD BY AUTOMATED COUNT: 15.3 % (ref 12.3–15.4)
GLOBULIN UR ELPH-MCNC: 2.6 GM/DL
GLUCOSE BLDC GLUCOMTR-MCNC: 128 MG/DL (ref 70–130)
GLUCOSE BLDC GLUCOMTR-MCNC: 130 MG/DL (ref 70–130)
GLUCOSE BLDC GLUCOMTR-MCNC: 132 MG/DL (ref 70–130)
GLUCOSE BLDC GLUCOMTR-MCNC: 138 MG/DL (ref 70–130)
GLUCOSE SERPL-MCNC: 134 MG/DL (ref 65–99)
HCT VFR BLD AUTO: 37.6 % (ref 37.5–51)
HGB BLD-MCNC: 11.7 G/DL (ref 13–17.7)
IMM GRANULOCYTES # BLD AUTO: 0.02 10*3/MM3 (ref 0–0.05)
IMM GRANULOCYTES NFR BLD AUTO: 0.3 % (ref 0–0.5)
LYMPHOCYTES # BLD AUTO: 1.62 10*3/MM3 (ref 0.7–3.1)
LYMPHOCYTES NFR BLD AUTO: 23.3 % (ref 19.6–45.3)
MCH RBC QN AUTO: 25.4 PG (ref 26.6–33)
MCHC RBC AUTO-ENTMCNC: 31.1 G/DL (ref 31.5–35.7)
MCV RBC AUTO: 81.7 FL (ref 79–97)
MONOCYTES # BLD AUTO: 0.76 10*3/MM3 (ref 0.1–0.9)
MONOCYTES NFR BLD AUTO: 10.9 % (ref 5–12)
NEUTROPHILS NFR BLD AUTO: 4.54 10*3/MM3 (ref 1.7–7)
NEUTROPHILS NFR BLD AUTO: 65.3 % (ref 42.7–76)
NRBC BLD AUTO-RTO: 0 /100 WBC (ref 0–0.2)
PLATELET # BLD AUTO: 204 10*3/MM3 (ref 140–450)
PMV BLD AUTO: 9.6 FL (ref 6–12)
POTASSIUM SERPL-SCNC: 4.4 MMOL/L (ref 3.5–5.2)
PROT SERPL-MCNC: 6.1 G/DL (ref 6–8.5)
RBC # BLD AUTO: 4.6 10*6/MM3 (ref 4.14–5.8)
SODIUM SERPL-SCNC: 138 MMOL/L (ref 136–145)
WBC NRBC COR # BLD: 6.96 10*3/MM3 (ref 3.4–10.8)

## 2022-10-07 PROCEDURE — 0 POTASSIUM CHLORIDE PER 2 MEQ: Performed by: SURGERY

## 2022-10-07 PROCEDURE — 99024 POSTOP FOLLOW-UP VISIT: CPT | Performed by: SURGERY

## 2022-10-07 PROCEDURE — 82962 GLUCOSE BLOOD TEST: CPT

## 2022-10-07 PROCEDURE — 25010000002 ENOXAPARIN PER 10 MG: Performed by: SURGERY

## 2022-10-07 PROCEDURE — 80053 COMPREHEN METABOLIC PANEL: CPT | Performed by: SURGERY

## 2022-10-07 PROCEDURE — 99232 SBSQ HOSP IP/OBS MODERATE 35: CPT | Performed by: INTERNAL MEDICINE

## 2022-10-07 PROCEDURE — 85025 COMPLETE CBC W/AUTO DIFF WBC: CPT | Performed by: SURGERY

## 2022-10-07 RX ORDER — BENZONATATE 100 MG/1
100 CAPSULE ORAL 3 TIMES DAILY PRN
Status: DISCONTINUED | OUTPATIENT
Start: 2022-10-07 | End: 2022-10-11 | Stop reason: HOSPADM

## 2022-10-07 RX ORDER — FLUCONAZOLE 150 MG/1
150 TABLET ORAL ONCE
Status: COMPLETED | OUTPATIENT
Start: 2022-10-07 | End: 2022-10-07

## 2022-10-07 RX ORDER — PANTOPRAZOLE SODIUM 40 MG/1
40 TABLET, DELAYED RELEASE ORAL
Status: DISCONTINUED | OUTPATIENT
Start: 2022-10-08 | End: 2022-10-09

## 2022-10-07 RX ADMIN — OXYCODONE 5 MG: 5 TABLET ORAL at 21:40

## 2022-10-07 RX ADMIN — GABAPENTIN 100 MG: 100 CAPSULE ORAL at 08:51

## 2022-10-07 RX ADMIN — ACETAMINOPHEN 1000 MG: 500 TABLET, FILM COATED ORAL at 05:24

## 2022-10-07 RX ADMIN — PANTOPRAZOLE SODIUM 40 MG: 40 INJECTION, POWDER, FOR SOLUTION INTRAVENOUS at 05:24

## 2022-10-07 RX ADMIN — POTASSIUM CHLORIDE AND SODIUM CHLORIDE 125 ML/HR: 450; 150 INJECTION, SOLUTION INTRAVENOUS at 12:24

## 2022-10-07 RX ADMIN — FLUCONAZOLE 150 MG: 150 TABLET ORAL at 08:51

## 2022-10-07 RX ADMIN — CARVEDILOL 12.5 MG: 12.5 TABLET, FILM COATED ORAL at 08:51

## 2022-10-07 RX ADMIN — ENOXAPARIN SODIUM 40 MG: 40 INJECTION SUBCUTANEOUS at 20:44

## 2022-10-07 RX ADMIN — ROSUVASTATIN CALCIUM 10 MG: 10 TABLET, FILM COATED ORAL at 08:51

## 2022-10-07 RX ADMIN — CARVEDILOL 12.5 MG: 12.5 TABLET, FILM COATED ORAL at 20:44

## 2022-10-07 RX ADMIN — AMLODIPINE BESYLATE 10 MG: 10 TABLET ORAL at 08:51

## 2022-10-07 RX ADMIN — ENOXAPARIN SODIUM 40 MG: 40 INJECTION SUBCUTANEOUS at 08:51

## 2022-10-07 RX ADMIN — POTASSIUM CHLORIDE AND SODIUM CHLORIDE 125 ML/HR: 450; 150 INJECTION, SOLUTION INTRAVENOUS at 19:08

## 2022-10-07 RX ADMIN — LOSARTAN POTASSIUM 100 MG: 50 TABLET, FILM COATED ORAL at 08:51

## 2022-10-07 NOTE — PAYOR COMM NOTE
"Von Rodríguez (56 y.o. Male)     [facility]   phone: [phone]  fax: [phone]    Updated clinical            Date of Birth   1965    Social Security Number       Address   [address] [place] [address]    Home Phone   [phone]    MRN   [id number]       Alevism   Voodoo    Marital Status                               Admission Date   10/5/22    Admission Type   Elective    Admitting Provider   Pete Connelly MD    Attending Provider   Pete Connelly MD    Department, Room/Bed   [facility] 2F, S210/1       Discharge Date       Discharge Disposition       Discharge Destination                               Attending Provider: Pete Connelly MD    Allergies: Codeine    Isolation: None   Infection: None   Code Status: CPR   Advance Care Planning Activity    Ht: 174 cm (68.5\")   Wt: 172 kg (379 lb 3.1 oz)    Admission Cmt: None   Principal Problem: Morbid obesity with body mass index (BMI) of 50.0 to 59.9 in adult (HCC) [E66.01,Z68.43]                 Active Insurance as of 10/5/2022     Primary Coverage     Payor Plan Insurance Group Employer/Plan Group    [employer]UE [employer]OSS [employer]UE [employer] PPO [id number]     Payor Plan Address Payor Plan Phone Number Payor Plan Fax Number Effective Dates    [address] [phone]  1/1/2021 - None Entered    [place] [address]       Subscriber Name Subscriber Birth Date Member ID       TOBIAS ARTEAGA 11/8/1968 [id number]                 Emergency Contacts      (Rel.) Home Phone Work Phone Mobile Phone    TOBIAS ARTEAGA (Significant Other) -- -- [phone]            Lab Results (last 48 hours)     Procedure Component Value Units Date/Time    CBC & Differential [[id number]]  (Abnormal) Collected: 10/07/22 0644    Specimen: Blood Updated: 10/07/22 0824    Narrative:      The following orders were created for panel order CBC & Differential.  Procedure                           "     Abnormality         Status                     ---------                               -----------         ------                     CBC Auto Differential[[id number]]        Abnormal            Final result                 Please view results for these tests on the individual orders.    CBC Auto Differential [[id number]]  (Abnormal) Collected: 10/07/22 0644    Specimen: Blood Updated: 10/07/22 0824     WBC 6.96 10*3/mm3      RBC 4.60 10*6/mm3      Hemoglobin 11.7 g/dL      Hematocrit 37.6 %      MCV 81.7 fL      MCH 25.4 pg      MCHC 31.1 g/dL      RDW 15.3 %      RDW-SD 45.7 fl      MPV 9.6 fL      Platelets 204 10*3/mm3      Neutrophil % 65.3 %      Lymphocyte % 23.3 %      Monocyte % 10.9 %      Eosinophil % 0.1 %      Basophil % 0.1 %      Immature Grans % 0.3 %      Neutrophils, Absolute 4.54 10*3/mm3      Lymphocytes, Absolute 1.62 10*3/mm3      Monocytes, Absolute 0.76 10*3/mm3      Eosinophils, Absolute 0.01 10*3/mm3      Basophils, Absolute 0.01 10*3/mm3      Immature Grans, Absolute 0.02 10*3/mm3      nRBC 0.0 /100 WBC     Comprehensive Metabolic Panel [[id number]] Collected: 10/07/22 0644    Specimen: Blood Updated: 10/07/22 0813    POC Glucose Once [[id number]]  (Normal) Collected: 10/07/22 0727    Specimen: Blood Updated: 10/07/22 0729     Glucose 130 mg/dL      Comment: Meter: [id number] : [id number] Jonah Trujillo       POC Glucose Once [[id number]]  (Abnormal) Collected: 10/06/22 2013    Specimen: Blood Updated: 10/06/22 2019     Glucose 135 mg/dL      Comment: Meter: [id number] : [id number] Aureliano Bean       Tissue Pathology Exam [[id number]] Collected: 10/05/22 0813    Specimen: Tissue from Stomach Updated: 10/06/22 1619     Case Report --     Surgical Pathology Report                         Case: [id number]                                  Authorizing Provider:  Pete Connelly MD    Collected:           10/05/2022 08:13 AM          Ordering Location:     [facility]    Received:            10/05/2022 12:05 PM                                 OR                                                                           Pathologist:           Hiram Dudley DO                                                         Specimen:    Stomach, SUB-TOTAL GASTRECTOMY                                                              Clinical Information --     Body mass index (BMI) of 50-59.9 in adult (HCC)  Hiatal hernia       Final Diagnosis --     STOMACH, SUBTOTAL GASTRECTOMY:  Benign hyperplastic polyp  Oxyntic-type gastric mucosa with minimal chronic inactive inflammation  Negative for intestinal metaplasia, dysplasia and malignancy  Stomach wall within normal limits       Gross Description --     1. Stomach.  Received in formalin labeled subtotal gastrectomy is a 20 x 6.5 x 4.8 cm intact, unopened portion of stomach with a staple line running the length of the specimen.  The serosa is tan-pink with minimal attached fat.  No significant surgical disruption is present.  The lumen contains a moderate amount of dark red viscous blood and the mucosa is red-pink and congested with normal, prominent rugal folds and minimal mucosal sloughing.  A single sessile polyp is present, 0.8 cm in greatest dimension.  No ulcers or areas of mucosal thickening are present.  Representative sections are submitted in blocks 1A-1C with the polyp in block 1A..   HDM           Microscopic Description --     The slides are reviewed and demonstrate histopathologic features supporting the above rendered diagnosis.        POC Glucose Once [[id number]]  (Abnormal) Collected: 10/06/22 1521    Specimen: Blood Updated: 10/06/22 1525     Glucose 137 mg/dL      Comment: Meter: [id number] : [id number] SellABand       POC Glucose Once [[id number]]  (Abnormal) Collected: 10/06/22 1203    Specimen: Blood Updated: 10/06/22 1209     Glucose 160 mg/dL      Comment: Meter: [id number] : [id number] Angeles Lore        Comprehensive Metabolic Panel [[id number]]  (Abnormal) Collected: 10/06/22 0725    Specimen: Blood Updated: 10/06/22 0907     Glucose 157 mg/dL      BUN 22 mg/dL      Creatinine 1.02 mg/dL      Sodium 139 mmol/L      Potassium 4.9 mmol/L      Chloride 103 mmol/L      CO2 25.0 mmol/L      Calcium 9.1 mg/dL      Total Protein 6.5 g/dL      Albumin 4.00 g/dL      ALT (SGPT) 108 U/L      AST (SGOT) 92 U/L      Alkaline Phosphatase 48 U/L      Total Bilirubin 0.3 mg/dL      Globulin 2.5 gm/dL      Comment: Calculated Result        A/G Ratio 1.6 g/dL      BUN/Creatinine Ratio 21.6     Anion Gap 11.0 mmol/L      eGFR 87.3 mL/min/1.73      Comment: National Kidney Foundation and American Society of Nephrology (ASN) Task Force recommended calculation based on the Chronic Kidney Disease Epidemiology Collaboration (CKD-EPI) equation refit without adjustment for race.       Narrative:      GFR Normal >60  Chronic Kidney Disease <60  Kidney Failure <15      Iron [[id number]]  (Abnormal) Collected: 10/06/22 0725    Specimen: Blood Updated: 10/06/22 0907     Iron 20 mcg/dL     CBC & Differential [[id number]]  (Abnormal) Collected: 10/06/22 0725    Specimen: Blood Updated: 10/06/22 0843    Narrative:      The following orders were created for panel order CBC & Differential.  Procedure                               Abnormality         Status                     ---------                               -----------         ------                     CBC Auto Differential[[id number]]        Abnormal            Final result                 Please view results for these tests on the individual orders.    CBC Auto Differential [[id number]]  (Abnormal) Collected: 10/06/22 0725    Specimen: Blood Updated: 10/06/22 0843     WBC 8.31 10*3/mm3      RBC 4.89 10*6/mm3      Hemoglobin 12.4 g/dL      Hematocrit 39.5 %      MCV 80.8 fL      MCH 25.4 pg      MCHC 31.4 g/dL      RDW 15.1 %      RDW-SD 43.8 fl      MPV 9.4 fL      Platelets 218 10*3/mm3       Neutrophil % 77.6 %      Lymphocyte % 11.7 %      Monocyte % 10.5 %      Eosinophil % 0.0 %      Basophil % 0.0 %      Immature Grans % 0.2 %      Neutrophils, Absolute 6.45 10*3/mm3      Lymphocytes, Absolute 0.97 10*3/mm3      Monocytes, Absolute 0.87 10*3/mm3      Eosinophils, Absolute 0.00 10*3/mm3      Basophils, Absolute 0.00 10*3/mm3      Immature Grans, Absolute 0.02 10*3/mm3      nRBC 0.0 /100 WBC     POC Glucose Once [[id number]]  (Abnormal) Collected: 10/06/22 0742    Specimen: Blood Updated: 10/06/22 0758     Glucose 145 mg/dL      Comment: Meter: [id number] : [id number] Guillermina Fulton       Hemoglobin & Hematocrit, Blood [[id number]]  (Normal) Collected: 10/05/22 1913    Specimen: Blood Updated: 10/05/22 2009     Hemoglobin 13.5 g/dL      Hematocrit 43.6 %     POC Glucose Once [[id number]]  (Abnormal) Collected: 10/05/22 1957    Specimen: Blood Updated: 10/05/22 1959     Glucose 175 mg/dL      Comment: Meter: [id number] : [id number] Aureliano Bean       POC Glucose Once [[id number]]  (Abnormal) Collected: 10/05/22 1645    Specimen: Blood Updated: 10/05/22 1647     Glucose 189 mg/dL      Comment: Meter: [id number] : [id number] Guillermina Fulton             Imaging Results (Last 48 Hours)     Procedure Component Value Units Date/Time    FL Upper GI Single Contrast With KUB [[id number]] Collected: 10/06/22 1616     Updated: 10/06/22 1616    Narrative:      EXAMINATION: FL UPPER GI SINGLE CONTRAST W KUB-     INDICATION: sleeve water soluble; K44.9-Diaphragmatic hernia without  obstruction or gangrene; Z68.43-Body mass index (BMI) 50.0-59.9, adult     TECHNIQUE: 24 seconds of fluoroscopic time was used for this exam. 7  associated fluoroscopic series were saved.  imaging reveals a  nonobstructive bowel gas pattern. Surgical clips are visible across the  upper abdomen. A suture line is visible in the left upper quadrant. A RIP  drain is visible across the left and right upper quadrants.     COMPARISON:  NONE     FINDINGS: Under fluoroscopic observation, the patient ingested  water-soluble contrast. The oral phase of deglutition appeared normal.  The esophageal mucosa appeared grossly normal. There was no evidence of  a focal esophageal stricture. Examination of the stomach demonstrated  postoperative changes that are consistent with a vertical sleeve  gastrectomy. No extravasation of contrast was seen. There is no delay in  gastric emptying.          Impression:      Status post vertical sleeve gastrectomy. There was no  evidence of extraluminal contrast. There was no delay in gastric  emptying.           XR Lost Needle / Instrument [[id number]] Collected: 10/05/22 1622     Updated: 10/05/22 2206    Narrative:      DATE OF EXAM: 10/5/2022 10:37 AM     PROCEDURE: XR LOST NEEDLE/INSTRUMENT-     INDICATIONS: no count ; K44.9-Diaphragmatic hernia without obstruction  or gangrene; Z68.43-Body mass index (BMI) 50.0-59.9, adult     COMPARISON: No comparisons available.     TECHNIQUE: A total of three portable AP images to include the abdomen  and pelvis.      FINDINGS:   History indicates no preprocedure count. RIP drain is seen in the upper  abdominal midline. There are multiple skin staples. Various lines are  superimposed lateral to the right chest and abdomen. No metallic foreign  body is seen to suggest retained needle or instrument. Bowel gas pattern  is nonobstructive.        Impression:      No evidence of retained needle or instrument.     This report was finalized on 10/5/2022 10:03 PM by Dr. Lit Mcgowan MD.              Operative/Procedure Notes (last 72 hours)      Pete Connelly MD at 10/05/22 0801          GASTRIC SLEEVE LAPAROSCOPIC, ESOPHAGOGASTRODUODENOSCOPY, LAPAROTOMY EXPLORATORY  Progress Note    Von Rodríguez  10/5/2022    Pre-op Diagnosis:   Body mass index (BMI) of 50-59.9 in adult (HCC) [Z68.43]  Hiatal hernia [K44.9]       Post-Op Diagnosis Codes:     * Body mass index (BMI) of 50-59.9 in  adult (HCC) [Z68.43]    Procedure/CPT® Codes:  FL LAP, KENDALL RESTRICT PROC, LONGITUDINAL GASTRECTOMY [88906]  FL EXPLORATORY OF ABDOMEN [86500]  FL ESOPHAGOGASTRODUODENOSCOPY TRANSORAL DIAGNOSTIC [36084]      Procedure(s):  GASTRIC SLEEVE LAPAROSCOPIC CONVERTED TO OPEN LAPAROTOMY EXPLORATORY  ESOPHAGOGASTRODUODENOSCOPY          Surgeon(s):  Pete Connelly MD    Anesthesia: General with Block    Staff:   Circulator: Bere Smith RN; Elisa Hammer RN; Jacque Camargo RN  Scrub Person: Arlene Chung; Efren Palm; Bernadette Blanco  Nursing Assistant: Lopez Reyes PCT  Assistant: Rosa Elena Rincon PA  Assistant: Rosa Elena Rincon PA retraction, suction, irrigation, closure.      Estimated Blood Loss: 300 mL    Urine Voided: * No values recorded between 10/5/2022  7:33 AM and 10/5/2022 11:24 AM *    Specimens:                Specimens     ID Source Type Tests Collected By Collected At Frozen?    A Stomach Tissue · TISSUE PATHOLOGY EXAM   Pete Connelly MD 10/5/22 0813 No    Description: SUB-TOTAL GASTRECTOMY    This specimen was not marked as sent.                Drains:   Closed/Suction Drain 1 Lateral RLQ (Active)       Findings:   Extremely technically challenging laparoscopic operation.  After placing the third staple line and attempting posterior exposure there was grasper injury to a large vein requiring emergent conversion to open laparotomy.  On entering the abdomen the bleeding had stopped and the exact etiology unclear and relatively minor amount of blood and clot in the abdomen.  Sleeve completed but extremely technically challenging open as well.  Counts correct but x-rays obtained as there was emergent conversion to open.  No visible hiatal hernia laparoscopically or endoscopically, Z-line 40 cm.  Operative time roughly 3 hours 20 minutes.        Complications: Intraperitoneal bleeding requiring emergent conversion to open laparotomy.    Assistant: Rosa Elena Rincon PA  was responsible  for performing the following activities: Retraction, Suction, Irrigation, Closing and Placing Dressing and their skilled assistance was necessary for the success of this case.    Pete Connelly MD     Date: 10/5/2022  Time: 11:24 EDT        Electronically signed by Pete Connelly MD at 10/05/22 1129     Pete Connelly MD at 10/05/22 0801        Preoperative Diagnosis:   Super morbid Obesity (173 kg, BMI 57.09) with Multiple Co-Morbidities    Postoperative Diagnosis:   Same    Procedure:                                             Laparoscopic converted to open vertical sleeve gastrectomy (85% subtotal vertical gastrectomy) over a 38 Nepali bougie dilator                                                                        EGD                                                                       Surgeon:                                                       SYDNI Connelly MD    Assistant:  Rosa Elena Rincon PA  was responsible for performing the following activities:  Retraction, Suction, Irrigation, Closing and Placing Dressing and their skilled assistance was necessary for the success of this case.    Anesthesia:                                                   GETA    EBL:                                                              300 cc    Fluids:                                                           Crystalloid    Specimens:                                                   Subtotal gastrectomy    Drains:                                                           #10 RIP    Counts:                                                          Correct    Complications:                                               Intraperitoneal bleeding requiring emergent conversion to open laparotomy    Findings:   Extremely technically challenging laparoscopic operation.  After placing the third staple line and attempting posterior exposure there was grasper injury to a large vein requiring emergent conversion  "to open laparotomy.  On entering the abdomen the bleeding had stopped and the exact etiology unclear and relatively minor amount of blood and clot in the abdomen.  Sleeve completed but extremely technically challenging open as well.  Counts correct but x-rays obtained as there was emergent conversion to open.  No visible hiatal hernia laparoscopically or endoscopically, Z-line 40 cm.  Operative time roughly 3 hours 20 minutes.    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  He presents for elective laparoscopic sleeve gastrectomy.  He has undergone our extensive preoperative education teaching and consent process everything is in order and he wishes to proceed.      Operative technique:     The patient was brought to the operating room, and placed supine upon the operating room table.  SCD hose were placed, he underwent uneventful general endotracheal anesthesia per the anesthesiology staff, he received IV Ancef, and subcutaneous Lovenox, the anesthesiology staff performed a tap block, and his abdomen was prepped and draped with ChloraPrep in a sterile fashion, an Ioban was used as well, a Portillo catheter was not placed.    The peritoneal cavity was entered in the left upper quadrant initially using an 11 mm fascial splitting trocar however it did not reach and therefore an extra long 12 mm fascial splitting trocar was used utilizing an Optiview technique and the abdomen was insufflated to a pressure of 15 mmHg with CO2 gas.  Exploratory laparoscopy revealed no evidence of injury from the entrance technique, a mildly enlarged smooth fatty liver, status postcholecystectomy, a couple widemouth fascial weaknesses in the right upper abdomen without definite incisional hernia and a single omental adhesion to the right lateral " abdomen.    Remaining trocars were placed under direct visualization including extra long 5 mm trochars in the right, mid, and left lateral abdomen and after infiltration of the peritoneum with local anesthetic (using a spinal needle which barely reached) under direct visualization the 19 mm Titan trocar was placed in the upper abdomen to the right of midline away from the rectus diastases.  It did not reach the peritoneal cavity and therefore temporarily this incision was reapproximated with towel clips.    Through a stab incision in the epigastrium a Irving retractor was used to elevate the left lobe of the liver.  As noted in the operative report at [facility], there was extreme torque on the abdominal wall with resulted decrease in pneumoperitoneum.  We assured the patient was fully paralyzed.  There was an inordinate amount of intra-abdominal fat.  The visible stomach was very enlarged and thickened even after decompression.  In order to perform the procedure I essentially had a move all trochars up under direct visualization to a subcostal position and several laparoscopy incisions made as needed as the procedure proceeded.  It seemed to work best when I repositioned the entrance trocar at a low almost horizontal angle so as not to push down as hard on the abdominal wall.    Beginning approximately two thirds of the way around the greater curvature the stomach, the gastrocolic vessels were divided using the Enseal device.  This was a slow tedious process requiring repositioning of the fascial entrances of the trochars.  Excessive oozing noted and 1 g TXA IV given.  I replaced one of the upper left subcostal trochars with an extra long 12 mm fascial splitting trocar to allow placement of a Ray-Alena and better CO2 insufflation.  The several additional trochars were used to help retract.  Gastrocolic division proceeded slowly and tediously proximally taking down all the short gastric vessels and exposing  the left cipriano.  No visible anterior or posterior hiatal hernias or lipomas and photodocumentation of the hiatus obtained.  Splenomegaly noted.  1 mg glucagon IV given.  Gastrocolic vessels were then divided medially to a few cm proximal to the pylorus.  Extensive adhesions of the posterior stomach to the pancreas and retroperitoneum essentially obliterating the lesser sac were divided.  In addition omental adhesions to the medial right lobe of the liver were divided in order to fully mobilized the antrum for subtotal gastrectomy.      The Titan stapler had been opened at the beginning of the case but it was clear with the lack of trocar length, his body habitus, and his very enlarged stomach that its use would not be possible.  A Medtronic Signia electric articulating stapler was therefore obtained.  Loads were 60 mm with included attached absorbable strips.  3 cm from the angularis was marked with a Kitner saturated with a marking pen.  The previously placed 38 Japanese blunt-tipped Titan bougie was positioned into the antrum and the balloon inflated with 15 cc of saline and withdrawn to the angularis with the balloon's center lined up with the marking.  Under direct visualization an extra long 15 mm trocar was placed in the upper abdomen to the right and the first firing performed approximately 6 cm from the pylorus to the marking at the angularis lateral to the balloon with a black load.  The 15 mm fascial splitting trocar was then moved more medially through one of the other laparoscopy sites and a second firing was performed with another 60 mm black load.  A third firing of a 60 mm black load was positioned and on trying to expose a view from the posterior stomach the grasper inadvertently hit what appeared to be a large vein with brisk bleeding.  Given his body habitus and degree of bleeding I felt there was no way to control this laparoscopically and to minimize the amount of blood loss and hopefully avoid  hemodynamic instability I elected to convert emergently to open laparotomy.  The stapler was removed without firing and pressure held on the area while open instruments were obtained.  An additional 1 g of TXA IV given.    Fortunately the patient remained hemodynamically stable while waiting as well as throughout the procedure.  The peritoneal cavity was entered through a generous left subcostal incision extended in a chevron fashion for short distance on the right.  Large amount of subcutaneous tissue as expected.  A Bookwalter retractor was placed.  On entering the abdomen there was much less blood than expected.  Clot noted.  Surprisingly there appeared to be no active bleeding.  Is difficult as exposure was laparoscopically, it was even more challenging open.  The upper abdomen could only be exposed briefly with extreme difficulty.  The previous bougie dilator remained in place.  The subtotal vertical sleeve gastrectomy was completed with 3 additional black loads.  Incidentally all firings registered as thick tissue on the Signia indicator (3).  The bougie dilator balloon was desufflated and the bougie was removed.  The subtotal gastrectomy specimen was retrieved, it was a massively enlarged and thickened specimen.  It was sent to pathology for permanent section.    The sleeve was submerged under saline.  Upper endoscopy was performed, and the endoscope was advanced into the duodenal bulb.  No air bubbles or leak seen, no bleeding at the staple line, no narrowing at the angularis, no pyloric spasm or deformity, no gastritis, no hiatal hernia or Stuart's esophagus, Z-line roughly 40 cm, and the endoscope was withdrawn.  Endoscopic photodocumentation of the hiatus obtained showing no hiatal hernia.  Irrigation fluid was suctioned free.  The sleeve was resting nicely and hemostatic.  The sleeve staple line was treated with previously obtained 10 cc of aerosolized Tisseel fibrin glue.  A #10 flat fully perforated  "RIP drain was placed under the left lobe of the liver and up over the spleen and brought out through the superior right-sided trocar site incision and anchored to the skin with a 2-0 nylon suture.   10 mg Barhemsys IV given.  Fascia was closed in 2 layers using a running #1 Vicryl to close peritoneum, posterior rectus fascia on the right and posterior rectus fascia, transversalis and internal oblique fascias on the left.  Anterior rectus fascia, linea alba, and external oblique fascia were closed with running #1 PDS suture beginning at both ends and tying in the middle of the right rectus.  Subcutaneous tissue was irrigated with saline and skin in all incisions were reapproximated with staples.  Dry sterile covderderms placed in a dry sterile dressing was placed around the RIP site.    Intraoperative x-rays confirmed no foreign bodies and confirmed by the radiologist. The patient tolerated the procedure well without further incident and was taken to the recovery room in stable condition.    Electronically signed by Pete Connelly MD at 10/05/22 1245          Physician Progress Notes (last 48 hours)      Pete Connelly MD at 10/06/22 6114        Cc: POD#1  Lap to open SG  \"feel ok\"    He is alone in the room.  He feels pretty good other than wanting to pass some gas.  He says the block worked very well and he does not have much pain.  He says he is ambulating and voiding well.  No pulmonary complaints, spirometer greater than 2000 witnessed.  Room air currently.  No flatus or bowel movement.  Tolerating his diet without nausea.    No fever pulse 97 respirations 18 blood pressure 141/97 but as high as 183/116 yesterday.  Saturation 93%    - 40 SS is no apparent distress.  Abdomen soft and appropriate.  Dressings dry.    CMP normal except glucose of 157 BUN of 22  AST 92.  Iron is 20.  White count 8.31 with 78 segs 12 lymphs 11 monocytes no bands.  H&H 12.4 and 39.5 with microcytic indices. "  H&H last p.m. 13.5 and 43.6.  Preoperative H&H 13.4 and 42.  Hemoglobin A1c 6.20.  Upper GI images and report reviewed, unremarkable.    Impression: Postop day #1 laparoscopic converted to open sleeve gastrectomy.  No signs of active bleeding on Lovenox.  Iron deficiency anemia without evidence of bleeding on Lovenox.  Insulin-dependent diabetes.  Elevated transaminases likely related to fatty liver and retraction during surgery, less likely shock liver.  Hypertension improved, appreciate hospitalist assistance.    Plan: Continue liquids, out of bed, pulmonary toilet, VTE prophylaxis.  IV iron.  Recheck labs in the morning.  Remove dressings tomorrow.  See orders.    Electronically signed by Pete Connelly MD at 10/06/22 1833     Junie Reece MD at 10/06/22 0974              [facility] Medicine Services  PROGRESS NOTE    Patient Name: Von Rodríguez  : 1965  MRN: [id number]    Date of Admission: 10/5/2022  Primary Care Physician: Provider, No Known    Subjective   Subjective     CC:  Med management post op sleeve     HPI:  Patient very pleasant and doing well. Was ambulating in the hallway earlier. On PCA but reports good pain control. BP labile but last readings improved. No other complaints at this time     ROS:  Gen- No fevers, chills  CV- No chest pain, palpitations  Resp- No cough, dyspnea  GI- No N/V/D, abd pain        Objective   Objective     Vital Signs:   Temp:  [97.1 °F (36.2 °C)-98.5 °F (36.9 °C)] 98.5 °F (36.9 °C)  Heart Rate:  [] 116  Resp:  [14-18] 18  BP: (126-183)/() 126/99  Flow (L/min):  [6-15] 6     Physical Exam:  GEN: NAD, resting in chair, BMI 57  HEENT: on room air, atraumatic, normocephalic  NECK: supple, no masses  RESP: on room air, normal effort  CV: on tele, sinus rhythm  PSYCH: normal affect, appropriate  NEURO: awake, alert, no focal deficits noted  MSK: no edema noted  SKIN: no rashes noted       Results Reviewed:  LAB RESULTS:       Lab 10/06/22  0725 10/05/22  1913 09/30/22  0937   WBC 8.31  --  4.56   HEMOGLOBIN 12.4* 13.5 13.4   HEMATOCRIT 39.5 43.6 42.0   PLATELETS 218  --  215   NEUTROS ABS 6.45  --   --    IMMATURE GRANS (ABS) 0.02  --   --    LYMPHS ABS 0.97  --   --    MONOS ABS 0.87  --   --    EOS ABS 0.00  --   --    MCV 80.8  --  79.2         Lab 10/06/22  0725 09/30/22  0937   SODIUM 139  --    POTASSIUM 4.9 4.0   CHLORIDE 103  --    CO2 25.0  --    ANION GAP 11.0  --    BUN 22*  --    CREATININE 1.02  --    EGFR 87.3  --    GLUCOSE 157*  --    CALCIUM 9.1  --    HEMOGLOBIN A1C  --  6.20*         Lab 10/06/22  0725   TOTAL PROTEIN 6.5   ALBUMIN 4.00   GLOBULIN 2.5   ALT (SGPT) 108*   AST (SGOT) 92*   BILIRUBIN 0.3   ALK PHOS 48                 Lab 10/06/22  0725 10/05/22  1015   IRON 20*  --    ABO TYPING  --  AB   RH TYPING  --  Positive   ANTIBODY SCREEN  --  Negative         Brief Urine Lab Results     None          Microbiology Results Abnormal     None          Peripheral Block    Result Date: 10/5/2022  Wilfredo Pak CRNA     10/5/2022  7:44 AM Peripheral Block Patient reassessed immediately prior to procedure Patient location during procedure: OR Reason for block: at surgeon's request and post-op pain management Performed by CRNA/CAA: Uma Wilson CRNA Preanesthetic Checklist Completed: patient identified, IV checked, site marked, risks and benefits discussed, surgical consent, monitors and equipment checked, pre-op evaluation and timeout performed Prep: Pt Position: supine Sterile barriers:cap, gloves, mask and washed/disinfected hands Prep: ChloraPrep Patient monitoring: blood pressure monitoring, continuous pulse oximetry and EKG Procedure Sedation: yes Performed under: general Guidance:ultrasound guided Images:still images obtained, printed/placed on chart Laterality:Bilateral Block Type:TAP Injection Technique:single-shot Needle Type:short-bevel and echogenic Needle Gauge:20 G Resistance on Injection:  "none Medications Used: dexamethasone sodium phosphate injection, 4 mg bupivacaine PF (MARCAINE) 0.25 % injection, 60 mL Med administered at 10/5/2022 7:43 AM Medications Comment:Block Injection:  LA dose divided between Right and Left block Post Assessment Injection Assessment: negative aspiration for heme, incremental injection and no paresthesia on injection Patient Tolerance:comfortable throughout block Complications:no Additional Notes Subcostal TAPs A high-frequency linear transducer, with sterile cover, was placed sub-xiphoid to identify Linea Alba, right and left Rectus Abdominus Muscles (SHANTI). The transducer was moved either right or left subcostally to identify the SHANTI and the Transverse Abdominus Muscle (MARI). The insertion site was prepped in sterile fashion and then localized with 2-5 ml of 1% Lidocaine. Using ultrasound-guidance, a 20-gauge B-Canales 4\" Ultraplex 360 non-stimulating echogenic needle was advanced in plane, from medial to lateral, until the tip of the needle was in the fascial plane between the SHANTI and MARI. 1-3ml of preservative free normal saline was used to hydro-dissect the fascial planes. After the fascial plane was verified, the local anesthetic (LA) was injected. The procedure was repeated on the opposite side for bilateral coverage. Aspiration every 5 ml to prevent intravascular injection. Injection was completed with negative aspiration of blood and negative intravascular injection. Injection pressures were normal with minimal resistance. The subcostal approach to the TAP nerve block ideally anesthetizes the intercostal nerves T6-T9. Mid-Axillary/Lateral TAPs A high-frequency linear transducer, with sterile cover, was placed in the midaxillary line between the ASIS and costal margin. The External Oblique Muscle (EOM), Internal Oblique Muscle (IOM), Transverse Abdominus Muscle (MARI), and Peritoneum were identified. The insertion site was prepped in sterile fashion and then localized " "with 2-5 ml of 1% Lidocaine. Using ultrasound-guidance, a 20-gauge B-Canales 4\" Ultraplex 360 non-stimulating echogenic needle was advanced in plane, from medial to lateral, until the tip of the needle was in the fascial plane between the IOM and MARI. 1-3ml of preservative free normal saline was used to hydro-dissect the fascial planes. After the fascial plane was verified, the local anesthetic (LA) was injected. The procedure was repeated on the opposite side for bilateral coverage. Aspiration every 5 ml to prevent intravascular injection. Injection was completed with negative aspiration of blood and negative intravascular injection. Injection pressures were normal with minimal resistance. Midaxillary TAPs should reach intercostal nerves T10- T11 and the subcostal nerve T12.     XR Lost Needle / Instrument    Result Date: 10/5/2022  DATE OF EXAM: 10/5/2022 10:37 AM  PROCEDURE: XR LOST NEEDLE/INSTRUMENT-  INDICATIONS: no count ; K44.9-Diaphragmatic hernia without obstruction or gangrene; Z68.43-Body mass index (BMI) 50.0-59.9, adult  COMPARISON: No comparisons available.  TECHNIQUE: A total of three portable AP images to include the abdomen and pelvis.  FINDINGS: History indicates no preprocedure count. RIP drain is seen in the upper abdominal midline. There are multiple skin staples. Various lines are superimposed lateral to the right chest and abdomen. No metallic foreign body is seen to suggest retained needle or instrument. Bowel gas pattern is nonobstructive.      Impression: No evidence of retained needle or instrument.  This report was finalized on 10/5/2022 10:03 PM by Dr. Lit Mcgowan MD.            I have reviewed the medications:  Scheduled Meds:acetaminophen, 1,000 mg, Oral, Q8H   Or  acetaminophen, 1,000 mg, Oral, Q8H  amLODIPine, 10 mg, Oral, Q24H  carvedilol, 12.5 mg, Oral, Q12H  enoxaparin, 40 mg, Subcutaneous, Q12H  gabapentin, 100 mg, Oral, TID   Or  gabapentin, 100 mg, Oral, TID  insulin lispro, 0-9 " Units, Subcutaneous, 4x Daily AC & at Bedtime  losartan, 100 mg, Oral, Q24H  pantoprazole, 40 mg, Intravenous, Q AM  rosuvastatin, 10 mg, Oral, Daily  IV Fluids 1000 mL + additives, 100 mL/hr, Intravenous, Once      Continuous Infusions:HYDROmorphone HCl-NaCl,   lactated ringers, 150 mL/hr, Last Rate: 150 mL/hr (10/05/22 1613)  sodium chloride 0.45 % with KCl 20 mEq, 125 mL/hr, Last Rate: 125 mL/hr (10/06/22 0840)      PRN Meds:.Albuterol Sulfate NEB Orderable  •  ALPRAZolam  •  diphenhydrAMINE  •  ferric gluconate  •  hydrALAZINE  •  HYDROmorphone **AND** naloxone  •  HYDROmorphone  •  LORazepam  •  LORazepam  •  Morphine **AND** naloxone  •  naloxone  •  ondansetron **FOLLOWED BY** [START ON 10/9/2022] ondansetron  •  ondansetron  •  oxyCODONE  •  phenol  •  prochlorperazine  •  promethazine  •  simethicone    Assessment & Plan   Assessment & Plan     Active Hospital Problems    Diagnosis  POA   • **Morbid obesity with body mass index (BMI) of 50.0 to 59.9 in adult ([facility]) [E66.01, Z68.43]  Not Applicable   • Hypertension [I10]  Yes   • Other hyperlipidemia [E78.49]  Yes   • JAD (obstructive sleep apnea) [G47.33]  Yes   • Type 2 diabetes mellitus without complication, with long-term current use of insulin ([facility]) [E11.9, Z79.4]  Not Applicable   • GERD (gastroesophageal reflux disease) [K21.9]  Yes   • Benign paroxysmal positional vertigo [H81.10]  Yes   • Asthma [J45.909]  Yes      Resolved Hospital Problems    Diagnosis Date Resolved POA   • Hiatal hernia [K44.9] 10/05/2022 Unknown        Brief Hospital Course to date:  Von Rodríguez is a 56 y.o.  male with a past medical history significant for hypertension, HLD, DM2, BPPV, GERD, asthma, JAD, and morbid obesity. Presents as a consult from Dr. Connelly. He is less than 24 hours s/p laparoscopic gastric sleeve and EGD. He is stable. Tolerated procedure well. Pain is controlled with PCA. Nurse is reporting oxygenation does drop mildly while patient is sleeping. He has  "worn CPAP remotely but wishes to avoid it if possible. Reports he still feels groggy from anesthesia, but otherwise no complaints. No fever, cough, congestion, SOB, or chest pain. No headache or focal weakness/paratehesias. Blood pressure has been elevated slightly. Antihypertensives resumed by bariatric service. Will continue to follow for medical management    Morbid Obesity S/P Lap Gastric Sleeve  - POD1 ( Connelly), doing well   - defer to bariatric surgery and wound care  - pain and nausea controlled  - close monitor on telemetry     Hypertension  HLD  - currently stable though has had some labile readings in past 24h. No changes currently as last readings 120s/90s  - resume Norvasc 10 mg, Coreg 12.5 mg, and cozaar 100 mg  - PRN hydralazine  - has room to uptitrate coreg if needed      DM2  - glucose stable. Hb A1c 6.2  - hold oral hypoglcemics  - SSI with scheduled accu checks     Asthma  JAD  - continue IS  - duo nebs with additional pulmonary toilet as needed- on RA during visit this AM  - encourage CPAP        Thank you for allowing [facility] Medicine Service to provide consultative care for your patient, we will continue to follow while clinically appropriate.     Junie Reece MD  10/06/22                Electronically signed by Junie Reece MD at 10/06/22 0940     Pete Connelly MD at 10/05/22 1836        Cc: NOS lap converted to open LSG  \"doing ok\"    He is in bed wearing a nonrebreather facemask.  He says he does not use a CPAP.  He has not used his spirometer.  He was able to get it to almost 2000.  No pulmonary complaints.  Says his pain is controlled with PCA.  He says his mouth feels dry.  He has voided at the bedside but not been out of bed.  RIP bulb full with bloody nonclotting fluid and the bulb is not compressed, emptied with the nurse.  Blood pressure has been running very high since surgery, currently listed at 163/113 but as high as 183/116.  Heart rate at one-point listed " at 186, currently 96.  Saturation 93%.  He is groggy but no apparent distress.  Not pale.  Abdomen seems appropriate but difficult to assess given the size.  Dressings are dry.    Will check an H&H, consult hospitalist service for blood pressure management.  Bolus normal saline.  Continue close observation and telemetry with heart monitor and oxygen saturation monitor.  Discussed your findings and that no hiatal hernia encountered laparoscopically or endoscopically at surgery and the hiatus left undisturbed.    Electronically signed by Pete Connelly MD at 10/05/22 1840          Consult Notes (last 48 hours)      Ramirez Rob PA-C at 10/05/22 1921      Consult Orders    1. Inpatient Hospitalist Consult [[id number]] ordered by Pete Connelly MD at 10/05/22 1835                    [facility] Medicine Services  CONSULT NOTE      Patient Name: Von Rodríguez  : 1965  MRN: [id number]    Primary Care Physician: Provider, No Known  Provider requesting consultation: Pete Connelly MD    Subjective   Subjective     Reason for Consultation:  Medical management    HPI:  Von Rodríguez is a 56 y.o. male with a past medical history significant for hypertension, HLD, DM2, BPPV, GERD, asthma, JAD, and morbid obesity. Presents as a consult from Dr. Connelly. He is less than 24 hours s/p laparoscopic gastric sleeve and EGD. He is stable. Tolerated procedure well. Pain is controlled with PCA. Nurse is reporting oxygenation does drop mildly while patient is sleeping. He has worn CPAP remotely but wishes to avoid it if possible. Reports he still feels groggy from anesthesia, but otherwise no complaints. No fever, cough, congestion, SOB, or chest pain. No headache or focal weakness/paratehesias. Blood pressure has been elevated slightly. Antihypertensives resumed by bariatric service. Will continue to follow for medical management.      Review of Systems   Constitutional: Negative for  chills, fatigue and fever.   HENT: Negative for congestion and trouble swallowing.    Eyes: Negative for photophobia and visual disturbance.   Respiratory: Negative for cough and shortness of breath.    Cardiovascular: Negative for chest pain and leg swelling.   Gastrointestinal: Negative for abdominal pain, diarrhea, nausea and vomiting.   Endocrine: Negative for cold intolerance and heat intolerance.   Genitourinary: Negative for dysuria and flank pain.   Musculoskeletal: Negative for back pain and gait problem.   Skin: Negative for pallor and rash.   Allergic/Immunologic: Negative for immunocompromised state.   Neurological: Negative for dizziness.   Hematological: Negative for adenopathy.   Psychiatric/Behavioral: Negative for agitation and confusion.       All other systems reviewed and are negative.     Personal History     Past Medical History:   Diagnosis Date   • Abnormal PFT     mild obstruction   • Asthma     childhood; does not have inhaler   • Back pain     chronic; no meds; no steroids   • Benign paroxysmal positional vertigo    • Candidal intertrigo    • Cornea transplant recipient     left only   • Elevated alanine aminotransferase (ALT) level    • Heartburn     EGD May 2021; no hx of h pylori; symptoms well controlled on omeprazole   • Helicobacter pylori ab+     took prevpak 6/22   • Hiatal hernia with gastroesophageal reflux     EGD 5/21 Debra Tucker MD   • Hypertension    • Incisional hernia     s/p attempted lap RNY RUQ incision   • Microcytic erythrocytes    • JAD (obstructive sleep apnea)     s/p uvulaplasty   • Other hyperlipidemia    • S/P cholecystectomy 1993    lap; gallstones   • Type 2 diabetes mellitus without complication, with long-term current use of insulin (HCC)     dx in 2018; on insulin since diagnosis; last A1c 6.2   • Wears glasses        Past Surgical History:   Procedure Laterality Date   • COLONOSCOPY     • CORNEAL TRANSPLANT Left    • DIAGNOSTIC LAPAROSCOPY      aborted  RNY gastric bypass due to high trocar torque. Dr. Gregory Pradhan [facility]. Complicated by incisional hernia   • ENDOSCOPY     • LAPAROSCOPIC CHOLECYSTECTOMY  1993    gallstones   • TONSILLECTOMY AND ADENOIDECTOMY  2007   • UVULOPLASTY      at time tonsils 2007 for JAD   • WISDOM TOOTH EXTRACTION      all removed       Family History: family history includes Dementia in his mother; Diabetes in his brother; Heart disease in his father; Hypertension in his brother, father, and mother; No Known Problems in his brother; Stroke in his father. Otherwise pertinent FHx was reviewed and unremarkable.     Social History:  reports that he has never smoked. He has never used smokeless tobacco. He reports previous alcohol use. He reports that he does not use drugs.    Medications:  Dulaglutide, Enoxaparin Sodium, FreeStyle Matt 14 Day Sensor, Insulin Glargine, Insulin Glargine-yfgn, Insulin Lispro, Insulin Pen Needle, Nirmatrelvir&Ritonavir 300/100, Vitamin D, amLODIPine, carvedilol, fluconazole, losartan-hydrochlorothiazide, metFORMIN, omeprazole OTC, and rosuvastatin    Scheduled Meds:acetaminophen, 1,000 mg, Oral, Q8H   Or  acetaminophen, 1,000 mg, Oral, Q8H  [START ON 10/6/2022] amLODIPine, 10 mg, Oral, Q24H  carvedilol, 12.5 mg, Oral, Q12H  ceFAZolin, 3 g, Intravenous, Q8H  [START ON 10/6/2022] cyanocobalamin, 1,000 mcg, Intramuscular, Once  [START ON 10/6/2022] enoxaparin, 40 mg, Subcutaneous, Q12H  gabapentin, 100 mg, Oral, TID   Or  gabapentin, 100 mg, Oral, TID  insulin lispro, 0-9 Units, Subcutaneous, 4x Daily AC & at Bedtime  [START ON 10/6/2022] losartan, 100 mg, Oral, Q24H  [START ON 10/6/2022] pantoprazole, 40 mg, Intravenous, Q AM  [START ON 10/6/2022] rosuvastatin, 10 mg, Oral, Daily  Scopolamine, 1 patch, Transdermal, Once  [START ON 10/6/2022] IV Fluids 1000 mL + additives, 100 mL/hr, Intravenous, Once      Continuous Infusions:HYDROmorphone HCl-NaCl,   lactated ringers, 9 mL/hr  lactated ringers, 150 mL/hr, Last  Rate: 150 mL/hr (10/05/22 1613)  [START ON 10/6/2022] sodium chloride 0.45 % with KCl 20 mEq, 125 mL/hr  sodium chloride, 150 mL/hr, Last Rate: 150 mL/hr (10/05/22 0733)  sodium chloride, 250 mL/hr      PRN Meds:.Albuterol Sulfate NEB Orderable  •  ALPRAZolam  •  diphenhydrAMINE  •  [START ON 10/6/2022] ferric gluconate  •  hydrALAZINE  •  HYDROmorphone **AND** naloxone  •  HYDROmorphone  •  LORazepam  •  LORazepam  •  Morphine **AND** naloxone  •  naloxone  •  ondansetron **FOLLOWED BY** [START ON 10/9/2022] ondansetron  •  ondansetron  •  oxyCODONE  •  phenol  •  prochlorperazine  •  promethazine  •  simethicone    Allergies   Allergen Reactions   • Codeine Rash       Objective   Objective     Vital Signs:   Temp:  [97 °F (36.1 °C)-97.4 °F (36.3 °C)] 97.4 °F (36.3 °C)  Heart Rate:  [] 96  Resp:  [14-18] 18  BP: (130-183)/() 163/113  Flow (L/min):  [15] 15    Physical Exam  Constitutional: Awake, alert  Eyes: PERRLA, sclerae anicteric, no conjunctival injection  HENT: NCAT, mucous membranes moist  Neck: Supple, no thyromegaly, no lymphadenopathy, trachea midline  Respiratory: Clear to auscultation bilaterally, nonlabored respirations   Cardiovascular: RRR, no murmurs, rubs, or gallops, palpable pedal pulses bilaterally  Gastrointestinal: Positive bowel sounds, soft, nontender, nondistended   Abdominal incisions clean and viable  Musculoskeletal: No bilateral ankle edema, no clubbing or cyanosis to extremities  Psychiatric: Appropriate affect, cooperative  Neurologic: Oriented x 3, strength symmetric in all extremities, Cranial Nerves grossly intact to confrontation, speech clear  Skin: No rashes      Result Review:  I have personally reviewed the results from the time of admission and agree with these findings:  [x]  Laboratory  [x]  Radiology  []  EKG/Telemetry   []  Pathology  []  Old records  []  Other:  Most notable findings include: vitals stable    LAB RESULTS:      Lab 09/30/22  0937   WBC 4.56    HEMOGLOBIN 13.4   HEMATOCRIT 42.0   PLATELETS 215   MCV 79.2         Lab 09/30/22  0937   POTASSIUM 4.0   HEMOGLOBIN A1C 6.20*                     Lab 10/05/22  1015   ABO TYPING AB   RH TYPING Positive   ANTIBODY SCREEN Negative         Brief Urine Lab Results     None        Microbiology Results (last 10 days)     Procedure Component Value - Date/Time    MRSA Screen Culture (Outpatient) - Swab, Nares [[id number]]  (Normal) Collected: 09/30/22 0937    Lab Status: Final result Specimen: Swab from Nares Updated: 10/01/22 1350     MRSA Screen Cx No Methicillin Resistant Staphylococcus aureus isolated    Narrative:      The negative predictive value of this diagnostic test is high and should only be used to consider de-escalating anti-MRSA therapy. A positive result may indicate colonization with MRSA and must be correlated clinically.          Peripheral Block    Result Date: 10/5/2022  Wilfredo Pak CRNA     10/5/2022  7:44 AM Peripheral Block Patient reassessed immediately prior to procedure Patient location during procedure: OR Reason for block: at surgeon's request and post-op pain management Performed by CRNA/CAA: Uma Wilson CRNA Preanesthetic Checklist Completed: patient identified, IV checked, site marked, risks and benefits discussed, surgical consent, monitors and equipment checked, pre-op evaluation and timeout performed Prep: Pt Position: supine Sterile barriers:cap, gloves, mask and washed/disinfected hands Prep: ChloraPrep Patient monitoring: blood pressure monitoring, continuous pulse oximetry and EKG Procedure Sedation: yes Performed under: general Guidance:ultrasound guided Images:still images obtained, printed/placed on chart Laterality:Bilateral Block Type:TAP Injection Technique:single-shot Needle Type:short-bevel and echogenic Needle Gauge:20 G Resistance on Injection: none Medications Used: dexamethasone sodium phosphate injection, 4 mg bupivacaine PF (MARCAINE) 0.25 %  "injection, 60 mL Med administered at 10/5/2022 7:43 AM Medications Comment:Block Injection:  LA dose divided between Right and Left block Post Assessment Injection Assessment: negative aspiration for heme, incremental injection and no paresthesia on injection Patient Tolerance:comfortable throughout block Complications:no Additional Notes Subcostal TAPs A high-frequency linear transducer, with sterile cover, was placed sub-xiphoid to identify Linea Alba, right and left Rectus Abdominus Muscles (SHANTI). The transducer was moved either right or left subcostally to identify the SHANTI and the Transverse Abdominus Muscle (MARI). The insertion site was prepped in sterile fashion and then localized with 2-5 ml of 1% Lidocaine. Using ultrasound-guidance, a 20-gauge B-Canales 4\" Ultraplex 360 non-stimulating echogenic needle was advanced in plane, from medial to lateral, until the tip of the needle was in the fascial plane between the SHANTI and MARI. 1-3ml of preservative free normal saline was used to hydro-dissect the fascial planes. After the fascial plane was verified, the local anesthetic (LA) was injected. The procedure was repeated on the opposite side for bilateral coverage. Aspiration every 5 ml to prevent intravascular injection. Injection was completed with negative aspiration of blood and negative intravascular injection. Injection pressures were normal with minimal resistance. The subcostal approach to the TAP nerve block ideally anesthetizes the intercostal nerves T6-T9. Mid-Axillary/Lateral TAPs A high-frequency linear transducer, with sterile cover, was placed in the midaxillary line between the ASIS and costal margin. The External Oblique Muscle (EOM), Internal Oblique Muscle (IOM), Transverse Abdominus Muscle (MARI), and Peritoneum were identified. The insertion site was prepped in sterile fashion and then localized with 2-5 ml of 1% Lidocaine. Using ultrasound-guidance, a 20-gauge B-Canales 4\" Ultraplex 360 " non-stimulating echogenic needle was advanced in plane, from medial to lateral, until the tip of the needle was in the fascial plane between the IOM and MARI. 1-3ml of preservative free normal saline was used to hydro-dissect the fascial planes. After the fascial plane was verified, the local anesthetic (LA) was injected. The procedure was repeated on the opposite side for bilateral coverage. Aspiration every 5 ml to prevent intravascular injection. Injection was completed with negative aspiration of blood and negative intravascular injection. Injection pressures were normal with minimal resistance. Midaxillary TAPs should reach intercostal nerves T10- T11 and the subcostal nerve T12.     XR Lost Needle / Instrument    Result Date: 10/5/2022  DATE OF EXAM: 10/5/2022 10:37 AM  PROCEDURE: XR LOST NEEDLE/INSTRUMENT-  INDICATIONS: no count ; K44.9-Diaphragmatic hernia without obstruction or gangrene; Z68.43-Body mass index (BMI) 50.0-59.9, adult  COMPARISON: No comparisons available.  TECHNIQUE: A total of three portable AP images to include the abdomen and pelvis.  FINDINGS: History indicates no preprocedure count. RIP drain is seen in the upper abdominal midline. There are multiple skin staples. Various lines are superimposed lateral to the right chest and abdomen. No metallic foreign body is seen to suggest retained needle or instrument. Bowel gas pattern is nonobstructive.      Impression: No evidence of retained needle or instrument.            Assessment & Plan   Assessment & Plan     Active Hospital Problems    Diagnosis  POA   • **Morbid obesity with body mass index (BMI) of 50.0 to 59.9 in adult ([facility]) [E66.01, Z68.43]  Not Applicable     Priority: High   • Hypertension [I10]  Yes     Priority: Medium   • Type 2 diabetes mellitus without complication, with long-term current use of insulin ([facility]) [E11.9, Z79.4]  Not Applicable     Priority: Medium   • Asthma [J45.909]  Yes     Priority: Medium   • Other  hyperlipidemia [E78.49]  Yes     Priority: Low   • JAD (obstructive sleep apnea) [G47.33]  Yes     Priority: Low   • GERD (gastroesophageal reflux disease) [K21.9]  Yes     Priority: Low   • Benign paroxysmal positional vertigo [H81.10]  Yes     Priority: Low      Resolved Hospital Problems    Diagnosis Date Resolved POA   • Hiatal hernia [K44.9] 10/05/2022 Unknown       Morbid Obesity S/P Lap Gastric Sleeve  - less than 24 hours post op ( Connelly)  - defer to bariatric surgery and wound care  - pain and nausea controlled  - close monitor on telemetry    Hypertension  HLD  - currently stable  - resume Norvasc 10 mg, Coreg 12.5 mg, and cozaar 100 mg  - PRN hydralazine    DM2  - glucose stable. Hb A1c 6.2  - hold oral hypoglcemics  - SSI with scheduled accu checks    Asthma  JAD  - continue IS  - duo nebs with additional pulmonary toilet as needed  - encourage CPAP      Thank you for allowing [facility] Medicine Service to provide consultative care for your patient, we will continue to follow while clinically appropriate.    Electronically signed by Ramirez Rob PA-C, 10/05/22, 7:42 PM EDT.              Electronically signed by Ramirez Rob PA-C at 10/05/22 1951

## 2022-10-07 NOTE — CASE MANAGEMENT/SOCIAL WORK
Continued Stay Note   [place]     Patient Name: Von Rodríguez  MRN: [id number]  Today's Date: 10/7/2022    Admit Date: 10/5/2022    Plan: CM update   Discharge Plan     Row Name 10/07/22 1501       Plan    Plan CM update    Plan Comments Plan remains for patient to return home upon discharge - pain being controlled by PCA at this time. No new DME or DC needs identified - CM will continue to follow -sammy [phone]    Final Discharge Disposition Code 01 - home or self-care               Discharge Codes    No documentation.                     Sammy Kearney RN

## 2022-10-07 NOTE — PLAN OF CARE
Goal Outcome Evaluation:  Plan of Care Reviewed With: patient        Progress: improving  Outcome Evaluation: VSS. RA. Pain controlled. PCA in use. Slowly drinking protein. Able to tolerate better thinned down with water. Pt edcauted that one cup will honly count as half since thinning with water. Pt ambulating. Reveiwed proper use of IS.

## 2022-10-07 NOTE — PROGRESS NOTES
[facility] Medicine Services  PROGRESS NOTE    Patient Name: Von Rodríguez  : 1965  MRN: [id number]    Date of Admission: 10/5/2022  Primary Care Physician: Provider, No Known    Subjective   Subjective     CC:  Med management post op sleeve     HPI:  Patient very pleasant and doing well. Slept in chair. Pain slightly worse today. Asking for tessalon pearls and diflucan. Reports some yeast issues. No other questions or concerns     ROS:  Gen- No fevers, chills  CV- No chest pain, palpitations  Resp- +cough, -dyspnea  GI- No N/V/D, abd pain        Objective   Objective     Vital Signs:   Temp:  [98 °F (36.7 °C)-98.8 °F (37.1 °C)] 98.2 °F (36.8 °C)  Heart Rate:  [] 96  Resp:  [18] 18  BP: (102-146)/() 125/90  Flow (L/min):  [2] 2     Physical Exam:  GEN: NAD, resting in chair, BMI 57  HEENT: on room air, atraumatic, normocephalic  NECK: supple, no masses  RESP: on room air, normal effort  CV: on tele, sinus rhythm  PSYCH: normal affect, appropriate  NEURO: awake, alert, no focal deficits noted  MSK: no edema noted  SKIN: no rashes noted       Results Reviewed:  LAB RESULTS:      Lab 10/07/22  0644 10/06/22  0725 10/05/22  1913   WBC 6.96 8.31  --    HEMOGLOBIN 11.7* 12.4* 13.5   HEMATOCRIT 37.6 39.5 43.6   PLATELETS 204 218  --    NEUTROS ABS 4.54 6.45  --    IMMATURE GRANS (ABS) 0.02 0.02  --    LYMPHS ABS 1.62 0.97  --    MONOS ABS 0.76 0.87  --    EOS ABS 0.01 0.00  --    MCV 81.7 80.8  --          Lab 10/07/22  0644 10/06/22  0725   SODIUM 138 139   POTASSIUM 4.4 4.9   CHLORIDE 104 103   CO2 23.0 25.0   ANION GAP 11.0 11.0   BUN 18 22*   CREATININE 0.79 1.02   EGFR 105.6 87.3   GLUCOSE 134* 157*   CALCIUM 9.0 9.1         Lab 10/07/22  0644 10/06/22  0725   TOTAL PROTEIN 6.1 6.5   ALBUMIN 3.50 4.00   GLOBULIN 2.6 2.5   ALT (SGPT) 88* 108*   AST (SGOT) 83* 92*   BILIRUBIN 0.4 0.3   ALK PHOS 53 48                 Lab 10/06/22  0725 10/05/22  1015   IRON 20*  --    ABO  TYPING  --  AB   RH TYPING  --  Positive   ANTIBODY SCREEN  --  Negative         Brief Urine Lab Results     None          Microbiology Results Abnormal     None          FL Upper GI Single Contrast With KUB    Result Date: 10/6/2022  EXAMINATION: FL UPPER GI SINGLE CONTRAST W KUB-  INDICATION: sleeve water soluble; K44.9-Diaphragmatic hernia without obstruction or gangrene; Z68.43-Body mass index (BMI) 50.0-59.9, adult  TECHNIQUE: 24 seconds of fluoroscopic time was used for this exam. 7 associated fluoroscopic series were saved.  imaging reveals a nonobstructive bowel gas pattern. Surgical clips are visible across the upper abdomen. A suture line is visible in the left upper quadrant. A RIP drain is visible across the left and right upper quadrants.  COMPARISON: NONE  FINDINGS: Under fluoroscopic observation, the patient ingested water-soluble contrast. The oral phase of deglutition appeared normal. The esophageal mucosa appeared grossly normal. There was no evidence of a focal esophageal stricture. Examination of the stomach demonstrated postoperative changes that are consistent with a vertical sleeve gastrectomy. No extravasation of contrast was seen. There is no delay in gastric emptying.       Impression: Status post vertical sleeve gastrectomy. There was no evidence of extraluminal contrast. There was no delay in gastric emptying.        XR Lost Needle / Instrument    Result Date: 10/5/2022  DATE OF EXAM: 10/5/2022 10:37 AM  PROCEDURE: XR LOST NEEDLE/INSTRUMENT-  INDICATIONS: no count ; K44.9-Diaphragmatic hernia without obstruction or gangrene; Z68.43-Body mass index (BMI) 50.0-59.9, adult  COMPARISON: No comparisons available.  TECHNIQUE: A total of three portable AP images to include the abdomen and pelvis.  FINDINGS: History indicates no preprocedure count. RIP drain is seen in the upper abdominal midline. There are multiple skin staples. Various lines are superimposed lateral to the right chest and  abdomen. No metallic foreign body is seen to suggest retained needle or instrument. Bowel gas pattern is nonobstructive.      Impression: No evidence of retained needle or instrument.  This report was finalized on 10/5/2022 10:03 PM by Dr. Lit Mcgowan MD.            I have reviewed the medications:  Scheduled Meds:acetaminophen, 1,000 mg, Oral, Q8H   Or  acetaminophen, 1,000 mg, Oral, Q8H  amLODIPine, 10 mg, Oral, Q24H  carvedilol, 12.5 mg, Oral, Q12H  enoxaparin, 40 mg, Subcutaneous, Q12H  gabapentin, 100 mg, Oral, TID   Or  gabapentin, 100 mg, Oral, TID  insulin lispro, 0-9 Units, Subcutaneous, 4x Daily AC & at Bedtime  losartan, 100 mg, Oral, Q24H  [START ON 10/8/2022] pantoprazole, 40 mg, Oral, Q AM  rosuvastatin, 10 mg, Oral, Daily  IV Fluids 1000 mL + additives, 100 mL/hr, Intravenous, Once      Continuous Infusions:HYDROmorphone HCl-NaCl,   lactated ringers, 150 mL/hr, Last Rate: 150 mL/hr (10/05/22 1613)  sodium chloride 0.45 % with KCl 20 mEq, 125 mL/hr, Last Rate: 125 mL/hr (10/06/22 2038)      PRN Meds:.Albuterol Sulfate NEB Orderable  •  ALPRAZolam  •  benzonatate  •  diphenhydrAMINE  •  hydrALAZINE  •  HYDROmorphone **AND** naloxone  •  HYDROmorphone  •  LORazepam  •  LORazepam  •  Morphine **AND** naloxone  •  naloxone  •  ondansetron **FOLLOWED BY** [START ON 10/9/2022] ondansetron  •  ondansetron  •  oxyCODONE  •  phenol  •  prochlorperazine  •  promethazine  •  simethicone    Assessment & Plan   Assessment & Plan     Active Hospital Problems    Diagnosis  POA   • **Morbid obesity with body mass index (BMI) of 50.0 to 59.9 in adult ([facility]) [E66.01, Z68.43]  Not Applicable   • Hypertension [I10]  Yes   • Other hyperlipidemia [E78.49]  Yes   • JAD (obstructive sleep apnea) [G47.33]  Yes   • Type 2 diabetes mellitus without complication, with long-term current use of insulin ([facility]) [E11.9, Z79.4]  Not Applicable   • GERD (gastroesophageal reflux disease) [K21.9]  Yes   • Benign paroxysmal positional vertigo  [H81.10]  Yes   • Asthma [J45.909]  Yes      Resolved Hospital Problems    Diagnosis Date Resolved POA   • Hiatal hernia [K44.9] 10/05/2022 Unknown        Brief Hospital Course to date:  Von Rodríguez is a 56 y.o. male with a past medical history significant for hypertension, HLD, DM2, BPPV, GERD, asthma, JAD, and morbid obesity. Presents as a consult from Dr. Connelly. He is less than 24 hours s/p laparoscopic gastric sleeve and EGD. He is stable. Tolerated procedure well. Pain is controlled with PCA. Nurse is reporting oxygenation does drop mildly while patient is sleeping. He has worn CPAP remotely but wishes to avoid it if possible. Reports he still feels groggy from anesthesia, but otherwise no complaints. No fever, cough, congestion, SOB, or chest pain. No headache or focal weakness/paratehesias. Blood pressure has been elevated slightly. Antihypertensives resumed by bariatric service. Will continue to follow for medical management    Morbid Obesity S/P Lap Gastric Sleeve  - POD2 ( Maric[place]), doing well   - defer to bariatric surgery and wound care  - pain and nausea controlled  - close monitor on telemetry     Hypertension  HLD  - currently stable --in fact on the lower side but adequate.   - resume Norvasc 10 mg, Coreg 12.5 mg, and cozaar 100 mg  - PRN hydralazine  - has room to uptitrate coreg if needed , though with lower readings no changes today      DM2  - glucose stable. Hb A1c 6.2  - hold oral hypoglcemics  - SSI with scheduled accu checks     Asthma  JAD  - continue IS  - duo nebs with additional pulmonary toilet as needed- on RA during visit this AM  - encourage CPAP        Thank you for allowing [facility] Medicine Service to provide consultative care for your patient, we will continue to follow while clinically appropriate.     Junie Reece MD  10/07/22

## 2022-10-07 NOTE — PROGRESS NOTES
"Cc: POD#2 laparoscopic converted to open sleeve gastrectomy \"feel OK\"    He is sitting up in a chair at the side of the bed alone in the room.  He says the soreness is not too bad.  His main complaint is pain with oral intake especially the shakes even when thinning it out with water.  Symptoms present but more tolerable with water and lemonade and the thinner liquids.  No nausea or vomiting.  Passing some flatus, no bowel movement.  Says he is voiding well.  No pulmonary complaints using his spirometer and getting it over 2000    No fever pulse 99 respirations 18 blood pressure 127/93 saturation 94%  - NR  RIP 40 - 80 SS he is in no apparent distress and conversant.  Abdomen is soft and appropriate.  Wound dressings removed, wounds look okay.  Bowel sounds present but hypoactive    Labs reviewed glucose 134 ALT 88 AST 83 normal white count with normal differential H&H 11.7 and 37.6 with microcytic indices.    Impression: Postop day #2 laparoscopic converted to open sleeve gastrectomy.  Iron deficiency anemia without evidence of bleeding on Lovenox.  Pain with oral intake as above.  Elevated transaminases improving.  Insulin-dependent diabetes.  Hypertension with earlier labile blood pressure.  JAD.    Plan: Continue liquids as tolerated, out of bed, pulmonary toilet, VTE prophylaxis.  May shower with RIP drain.  Appreciate hospitalist assistance.  He admits he is not ready to go home as he thinks he would become dehydrated from suboptimal oral intake secondary to pain with drinking as above.  He is hoping to go home tomorrow.  See orders  "

## 2022-10-08 ENCOUNTER — APPOINTMENT (OUTPATIENT)
Dept: CT IMAGING | Facility: HOSPITAL | Age: 57
End: 2022-10-08

## 2022-10-08 LAB
ALBUMIN SERPL-MCNC: 3.3 G/DL (ref 3.5–5.2)
ALBUMIN/GLOB SERPL: 1 G/DL
ALP SERPL-CCNC: 54 U/L (ref 39–117)
ALT SERPL W P-5'-P-CCNC: 67 U/L (ref 1–41)
ANION GAP SERPL CALCULATED.3IONS-SCNC: 12 MMOL/L (ref 5–15)
AST SERPL-CCNC: 45 U/L (ref 1–40)
BASOPHILS # BLD AUTO: 0.03 10*3/MM3 (ref 0–0.2)
BASOPHILS NFR BLD AUTO: 0.4 % (ref 0–1.5)
BILIRUB SERPL-MCNC: 0.5 MG/DL (ref 0–1.2)
BUN SERPL-MCNC: 15 MG/DL (ref 6–20)
BUN/CREAT SERPL: 15.8 (ref 7–25)
CALCIUM SPEC-SCNC: 9 MG/DL (ref 8.6–10.5)
CHLORIDE SERPL-SCNC: 103 MMOL/L (ref 98–107)
CO2 SERPL-SCNC: 21 MMOL/L (ref 22–29)
CREAT SERPL-MCNC: 0.95 MG/DL (ref 0.76–1.27)
DEPRECATED RDW RBC AUTO: 46.1 FL (ref 37–54)
EGFRCR SERPLBLD CKD-EPI 2021: 95.1 ML/MIN/1.73
EOSINOPHIL # BLD AUTO: 0.1 10*3/MM3 (ref 0–0.4)
EOSINOPHIL NFR BLD AUTO: 1.4 % (ref 0.3–6.2)
ERYTHROCYTE [DISTWIDTH] IN BLOOD BY AUTOMATED COUNT: 15.4 % (ref 12.3–15.4)
GLOBULIN UR ELPH-MCNC: 3.3 GM/DL
GLUCOSE BLDC GLUCOMTR-MCNC: 115 MG/DL (ref 70–130)
GLUCOSE BLDC GLUCOMTR-MCNC: 120 MG/DL (ref 70–130)
GLUCOSE BLDC GLUCOMTR-MCNC: 122 MG/DL (ref 70–130)
GLUCOSE BLDC GLUCOMTR-MCNC: 137 MG/DL (ref 70–130)
GLUCOSE SERPL-MCNC: 126 MG/DL (ref 65–99)
HCT VFR BLD AUTO: 36.8 % (ref 37.5–51)
HGB BLD-MCNC: 11.3 G/DL (ref 13–17.7)
IMM GRANULOCYTES # BLD AUTO: 0.02 10*3/MM3 (ref 0–0.05)
IMM GRANULOCYTES NFR BLD AUTO: 0.3 % (ref 0–0.5)
LYMPHOCYTES # BLD AUTO: 2.19 10*3/MM3 (ref 0.7–3.1)
LYMPHOCYTES NFR BLD AUTO: 31.6 % (ref 19.6–45.3)
MCH RBC QN AUTO: 25.1 PG (ref 26.6–33)
MCHC RBC AUTO-ENTMCNC: 30.7 G/DL (ref 31.5–35.7)
MCV RBC AUTO: 81.8 FL (ref 79–97)
MONOCYTES # BLD AUTO: 0.71 10*3/MM3 (ref 0.1–0.9)
MONOCYTES NFR BLD AUTO: 10.2 % (ref 5–12)
NEUTROPHILS NFR BLD AUTO: 3.88 10*3/MM3 (ref 1.7–7)
NEUTROPHILS NFR BLD AUTO: 56.1 % (ref 42.7–76)
NRBC BLD AUTO-RTO: 0 /100 WBC (ref 0–0.2)
PLATELET # BLD AUTO: 213 10*3/MM3 (ref 140–450)
PMV BLD AUTO: 9.4 FL (ref 6–12)
POTASSIUM SERPL-SCNC: 4.5 MMOL/L (ref 3.5–5.2)
PROT SERPL-MCNC: 6.6 G/DL (ref 6–8.5)
RBC # BLD AUTO: 4.5 10*6/MM3 (ref 4.14–5.8)
SODIUM SERPL-SCNC: 136 MMOL/L (ref 136–145)
WBC NRBC COR # BLD: 6.93 10*3/MM3 (ref 3.4–10.8)

## 2022-10-08 PROCEDURE — 85025 COMPLETE CBC W/AUTO DIFF WBC: CPT | Performed by: SURGERY

## 2022-10-08 PROCEDURE — 0 DIATRIZOATE MEGLUMINE & SODIUM PER 1 ML

## 2022-10-08 PROCEDURE — 99024 POSTOP FOLLOW-UP VISIT: CPT | Performed by: SURGERY

## 2022-10-08 PROCEDURE — 25010000002 IOPAMIDOL 61 % SOLUTION: Performed by: SURGERY

## 2022-10-08 PROCEDURE — 82962 GLUCOSE BLOOD TEST: CPT

## 2022-10-08 PROCEDURE — 80053 COMPREHEN METABOLIC PANEL: CPT | Performed by: SURGERY

## 2022-10-08 PROCEDURE — 74177 CT ABD & PELVIS W/CONTRAST: CPT

## 2022-10-08 PROCEDURE — 0 POTASSIUM CHLORIDE PER 2 MEQ: Performed by: SURGERY

## 2022-10-08 PROCEDURE — 25010000002 ENOXAPARIN PER 10 MG: Performed by: SURGERY

## 2022-10-08 PROCEDURE — 99232 SBSQ HOSP IP/OBS MODERATE 35: CPT | Performed by: INTERNAL MEDICINE

## 2022-10-08 RX ORDER — ACETAMINOPHEN 325 MG/1
650 TABLET ORAL EVERY 6 HOURS PRN
Status: DISCONTINUED | OUTPATIENT
Start: 2022-10-08 | End: 2022-10-11 | Stop reason: HOSPADM

## 2022-10-08 RX ORDER — ACETAMINOPHEN 325 MG/1
TABLET ORAL
Status: DISPENSED
Start: 2022-10-08 | End: 2022-10-08

## 2022-10-08 RX ORDER — ACETAMINOPHEN 325 MG/1
TABLET ORAL
Status: COMPLETED
Start: 2022-10-08 | End: 2022-10-08

## 2022-10-08 RX ORDER — LOSARTAN POTASSIUM 100 MG/1
100 TABLET ORAL
Qty: 30 TABLET | Refills: 0 | Status: SHIPPED | OUTPATIENT
Start: 2022-10-08 | End: 2022-10-11 | Stop reason: HOSPADM

## 2022-10-08 RX ADMIN — LOSARTAN POTASSIUM 100 MG: 50 TABLET, FILM COATED ORAL at 09:25

## 2022-10-08 RX ADMIN — IOPAMIDOL 100 ML: 612 INJECTION, SOLUTION INTRAVENOUS at 15:36

## 2022-10-08 RX ADMIN — POTASSIUM CHLORIDE AND SODIUM CHLORIDE 125 ML/HR: 450; 150 INJECTION, SOLUTION INTRAVENOUS at 20:32

## 2022-10-08 RX ADMIN — POTASSIUM CHLORIDE AND SODIUM CHLORIDE 125 ML/HR: 450; 150 INJECTION, SOLUTION INTRAVENOUS at 10:15

## 2022-10-08 RX ADMIN — PANTOPRAZOLE SODIUM 40 MG: 40 TABLET, DELAYED RELEASE ORAL at 06:15

## 2022-10-08 RX ADMIN — ACETAMINOPHEN 650 MG: 325 TABLET ORAL at 02:25

## 2022-10-08 RX ADMIN — ACETAMINOPHEN 650 MG: 325 TABLET ORAL at 20:15

## 2022-10-08 RX ADMIN — ROSUVASTATIN CALCIUM 10 MG: 10 TABLET, FILM COATED ORAL at 09:25

## 2022-10-08 RX ADMIN — CARVEDILOL 12.5 MG: 12.5 TABLET, FILM COATED ORAL at 20:18

## 2022-10-08 RX ADMIN — AMLODIPINE BESYLATE 10 MG: 10 TABLET ORAL at 09:25

## 2022-10-08 RX ADMIN — DIATRIZOATE MEGLUMINE AND DIATRIZOATE SODIUM 15 ML: 660; 100 LIQUID ORAL; RECTAL at 13:21

## 2022-10-08 RX ADMIN — CARVEDILOL 12.5 MG: 12.5 TABLET, FILM COATED ORAL at 09:25

## 2022-10-08 RX ADMIN — ENOXAPARIN SODIUM 40 MG: 40 INJECTION SUBCUTANEOUS at 20:18

## 2022-10-08 RX ADMIN — ENOXAPARIN SODIUM 40 MG: 40 INJECTION SUBCUTANEOUS at 09:25

## 2022-10-08 RX ADMIN — OXYCODONE 5 MG: 5 TABLET ORAL at 16:32

## 2022-10-08 NOTE — PLAN OF CARE
Goal Outcome Evaluation:  Plan of Care Reviewed With: patient        Progress: no change  Outcome Evaluation: VSS on room air, PO pain meds given x1 for c/o abdominal pain, no c/o nausea, ambulating independently. Encouraged IS use.

## 2022-10-08 NOTE — PROGRESS NOTES
"Cc: POD#3 lap converted to open LSG  \"don't feel as well, had a fever\"    He is sitting up in a chair at the side of the bed.  He said he does not feel as well today and had a temperature of \"100.8\" last evening.  Denies cough or dysuria.  Still with fairly significant pain only with oral intake.  He says it hurts most when it reaches his stomach and he gets pain and a lot of gas and bloating.  Says he continues to pass flatus.  He says the incisional pain is not too bad.  No pulmonary complaints, using his spirometer.  No bowel movement.    No fever recorded but I can see current temp 98.1 pulse 103 respirations 18 blood pressure 135/89 saturation 95% UO NR  RIP 80 - 100 SS, not enteric decreased affect but in no apparent distress.  Abdomen seems appropriate and nontender, bowel sounds active.  Wounds look okay, no cellulitis or drainage.  RIP site looks fine.    CMP normal except for glucose of 126 CO2 21 albumin 3.30 ALT 67 AST 45.  White count normal at 6.93 with a normal differential.  H&H 11.3 and 36.8 with microcytic indices.    Impression: Postop day #3 laparoscopic converted to open sleeve gastrectomy.  Apparent low-grade fever last night.  Some tachycardia.  Poor oral intake secondary to abdominal pain.  Mild elevation in transaminases improving.  Low albumin.  Diabetes.  Iron deficiency anemia with stable H&H on Lovenox.  Hypertension.  Asthma.  Obstructive sleep apnea.  Hyperlipidemia    Plan: Check a prealbumin in the morning.  CT scan of the abdomen and pelvis today.  See orders.  "

## 2022-10-08 NOTE — PROGRESS NOTES
[facility] Medicine Services  PROGRESS NOTE    Patient Name: Von Rodríguez  : 1965  MRN: [id number]    Date of Admission: 10/5/2022  Primary Care Physician: Provider, No Known    Subjective   Subjective     CC:  Med management post op sleeve     HPI:  Patient doing ok. Reports a lot of ongoing gas pain and appears slightly more uncomfortable than previous. Family at bedside. Reports fever overnight and did d/w nursing tech Tmax 100.9 axillary. Patient otherwise has no focal complaints and feels well.     ROS:  Gen- +fever as above  CV- No chest pain, palpitations  Resp- -cough, -dyspnea  GI- No N/V/D, +abd pain- gas pain        Objective   Objective     Vital Signs:   Temp:  [97.5 °F (36.4 °C)-98.8 °F (37.1 °C)] 98.1 °F (36.7 °C)  Heart Rate:  [] 88  Resp:  [16-18] 16  BP: (118-138)/(74-93) 120/74     Physical Exam:  GEN: NAD, resting in chair, BMI 57  HEENT: on room air, atraumatic, normocephalic  NECK: supple, no masses  RESP: on room air, normal effort  CV: on tele, sinus rhythm  PSYCH: normal affect, appropriate  NEURO: awake, alert, no focal deficits noted  MSK: no edema noted  SKIN: no rashes noted       Results Reviewed:  LAB RESULTS:      Lab 10/08/22  0550 10/07/22  0644 10/06/22  0725 10/05/22  1913   WBC 6.93 6.96 8.31  --    HEMOGLOBIN 11.3* 11.7* 12.4* 13.5   HEMATOCRIT 36.8* 37.6 39.5 43.6   PLATELETS 213 204 218  --    NEUTROS ABS 3.88 4.54 6.45  --    IMMATURE GRANS (ABS) 0.02 0.02 0.02  --    LYMPHS ABS 2.19 1.62 0.97  --    MONOS ABS 0.71 0.76 0.87  --    EOS ABS 0.10 0.01 0.00  --    MCV 81.8 81.7 80.8  --          Lab 10/08/22  0550 10/07/22  0644 10/06/22  0725   SODIUM 136 138 139   POTASSIUM 4.5 4.4 4.9   CHLORIDE 103 104 103   CO2 21.0* 23.0 25.0   ANION GAP 12.0 11.0 11.0   BUN 15 18 22*   CREATININE 0.95 0.79 1.02   EGFR 95.1 105.6 87.3   GLUCOSE 126* 134* 157*   CALCIUM 9.0 9.0 9.1         Lab 10/08/22  0550 10/07/22  0644 10/06/22  0725   TOTAL  PROTEIN 6.6 6.1 6.5   ALBUMIN 3.30* 3.50 4.00   GLOBULIN 3.3 2.6 2.5   ALT (SGPT) 67* 88* 108*   AST (SGOT) 45* 83* 92*   BILIRUBIN 0.5 0.4 0.3   ALK PHOS 54 53 48                 Lab 10/06/22  0725 10/05/22  1015   IRON 20*  --    ABO TYPING  --  AB   RH TYPING  --  Positive   ANTIBODY SCREEN  --  Negative         Brief Urine Lab Results     None          Microbiology Results Abnormal     None          FL Upper GI Single Contrast With KUB    Result Date: 10/7/2022  EXAMINATION: FL UPPER GI SINGLE CONTRAST W KUB-  INDICATION: sleeve water soluble; K44.9-Diaphragmatic hernia without obstruction or gangrene; Z68.43-Body mass index (BMI) 50.0-59.9, adult  TECHNIQUE: 24 seconds of fluoroscopic time was used for this exam. 7 associated fluoroscopic series were saved.  imaging reveals a nonobstructive bowel gas pattern. Surgical clips are visible across the upper abdomen. A suture line is visible in the left upper quadrant. A RIP drain is visible across the left and right upper quadrants.  COMPARISON: NONE  FINDINGS: Under fluoroscopic observation, the patient ingested water-soluble contrast. The oral phase of deglutition appeared normal. The esophageal mucosa appeared grossly normal. There was no evidence of a focal esophageal stricture. Examination of the stomach demonstrated postoperative changes that are consistent with a vertical sleeve gastrectomy. No extravasation of contrast was seen. There is no delay in gastric emptying.       Impression: Status post vertical sleeve gastrectomy. There was no evidence of extraluminal contrast. There was no delay in gastric emptying.    This report was finalized on 10/7/2022 10:45 AM by Dr. Lit Mcgowan MD.            I have reviewed the medications:  Scheduled Meds:acetaminophen, , ,   amLODIPine, 10 mg, Oral, Q24H  carvedilol, 12.5 mg, Oral, Q12H  enoxaparin, 40 mg, Subcutaneous, Q12H  insulin lispro, 0-9 Units, Subcutaneous, 4x Daily AC & at Bedtime  losartan, 100 mg, Oral,  Q24H  pantoprazole, 40 mg, Oral, Q AM  rosuvastatin, 10 mg, Oral, Daily  IV Fluids 1000 mL + additives, 100 mL/hr, Intravenous, Once      Continuous Infusions:lactated ringers, 150 mL/hr, Last Rate: 150 mL/hr (10/05/22 1613)  sodium chloride 0.45 % with KCl 20 mEq, 125 mL/hr, Last Rate: 125 mL/hr (10/07/22 1908)      PRN Meds:.•  acetaminophen  •  Albuterol Sulfate NEB Orderable  •  ALPRAZolam  •  benzonatate  •  diphenhydrAMINE  •  hydrALAZINE  •  HYDROmorphone **AND** naloxone  •  HYDROmorphone  •  LORazepam  •  LORazepam  •  Morphine **AND** naloxone  •  naloxone  •  ondansetron **FOLLOWED BY** [START ON 10/9/2022] ondansetron  •  ondansetron  •  oxyCODONE  •  phenol  •  prochlorperazine  •  promethazine  •  simethicone    Assessment & Plan   Assessment & Plan     Active Hospital Problems    Diagnosis  POA   • **Morbid obesity with body mass index (BMI) of 50.0 to 59.9 in adult ([facility]) [E66.01, Z68.43]  Not Applicable   • Hypertension [I10]  Yes   • Other hyperlipidemia [E78.49]  Yes   • JAD (obstructive sleep apnea) [G47.33]  Yes   • Type 2 diabetes mellitus without complication, with long-term current use of insulin ([facility]) [E11.9, Z79.4]  Not Applicable   • GERD (gastroesophageal reflux disease) [K21.9]  Yes   • Benign paroxysmal positional vertigo [H81.10]  Yes   • Asthma [J45.909]  Yes      Resolved Hospital Problems    Diagnosis Date Resolved POA   • Hiatal hernia [K44.9] 10/05/2022 Unknown        Brief Hospital Course to date:  Von Rodríguez is a 56 y.o. male with a past medical history significant for hypertension, HLD, DM2, BPPV, GERD, asthma, JAD, and morbid obesity. Presents as a consult from Dr. Connelly. He is less than 24 hours s/p laparoscopic gastric sleeve and EGD. He is stable. Tolerated procedure well. Pain is controlled with PCA. Nurse is reporting oxygenation does drop mildly while patient is sleeping. He has worn CPAP remotely but wishes to avoid it if possible. Reports he still feels groggy from  anesthesia, but otherwise no complaints. No fever, cough, congestion, SOB, or chest pain. No headache or focal weakness/paratehesias. Blood pressure has been elevated slightly. Antihypertensives resumed by bariatric service. Will continue to follow for medical management    Morbid Obesity S/P Lap Gastric Sleeve  - POD3 ( Connelly), doing well   - defer to bariatric surgery and wound care  - close monitor on telemetry  - having bowel fxn      Hypertension  HLD  - currently stable --in fact on the lower side but adequate.   - resume Norvasc 10 mg, Coreg 12.5 mg, and cozaar 100 mg  - have d/w patient that if discharged today would hold on HCTZ component at discharge. Remainder of meds unchanged. Will write new Rx for losartan given he normally takes in combination form      DM2  - glucose stable. Hb A1c 6.2  - ok to resume home regimen unchanged at discharge     Asthma  JAD  - continue IS  - duo nebs with additional pulmonary toilet as needed- on RA during visit this AM  - encourage CPAP    Ok for discharge if cleared by primary team. Will adjust med rec for anti-HTN and diabetic medications.         Thank you for allowing [facility] Medicine Service to provide consultative care for your patient, we will continue to follow while clinically appropriate.     Junie Reece MD  10/08/22

## 2022-10-08 NOTE — PLAN OF CARE
Goal Outcome Evaluation:  Plan of Care Reviewed With: patient         VSS, RA. SR, Patient ambulating without diff and minimal assist. Pain controlled with current regimen. Will continue current plan of care

## 2022-10-09 LAB
ALBUMIN SERPL-MCNC: 3.2 G/DL (ref 3.5–5.2)
ALBUMIN/GLOB SERPL: 1 G/DL
ALP SERPL-CCNC: 58 U/L (ref 39–117)
ALT SERPL W P-5'-P-CCNC: 58 U/L (ref 1–41)
ANION GAP SERPL CALCULATED.3IONS-SCNC: 13 MMOL/L (ref 5–15)
AST SERPL-CCNC: 42 U/L (ref 1–40)
BASOPHILS # BLD AUTO: 0.03 10*3/MM3 (ref 0–0.2)
BASOPHILS NFR BLD AUTO: 0.5 % (ref 0–1.5)
BH BB BLOOD EXPIRATION DATE: NORMAL
BH BB BLOOD EXPIRATION DATE: NORMAL
BH BB BLOOD TYPE BARCODE: 2800
BH BB BLOOD TYPE BARCODE: 8400
BH BB DISPENSE STATUS: NORMAL
BH BB DISPENSE STATUS: NORMAL
BH BB PRODUCT CODE: NORMAL
BH BB PRODUCT CODE: NORMAL
BH BB UNIT NUMBER: NORMAL
BH BB UNIT NUMBER: NORMAL
BILIRUB SERPL-MCNC: 0.6 MG/DL (ref 0–1.2)
BUN SERPL-MCNC: 11 MG/DL (ref 6–20)
BUN/CREAT SERPL: 16.7 (ref 7–25)
CALCIUM SPEC-SCNC: 8.9 MG/DL (ref 8.6–10.5)
CHLORIDE SERPL-SCNC: 103 MMOL/L (ref 98–107)
CHOLEST SERPL-MCNC: 103 MG/DL (ref 0–200)
CO2 SERPL-SCNC: 21 MMOL/L (ref 22–29)
CREAT SERPL-MCNC: 0.66 MG/DL (ref 0.76–1.27)
CROSSMATCH INTERPRETATION: NORMAL
CROSSMATCH INTERPRETATION: NORMAL
CRP SERPL-MCNC: 13.13 MG/DL (ref 0–0.5)
D-LACTATE SERPL-SCNC: 1.3 MMOL/L (ref 0.5–2)
DEPRECATED RDW RBC AUTO: 44.5 FL (ref 37–54)
EGFRCR SERPLBLD CKD-EPI 2021: 111.5 ML/MIN/1.73
EOSINOPHIL # BLD AUTO: 0.32 10*3/MM3 (ref 0–0.4)
EOSINOPHIL NFR BLD AUTO: 4.9 % (ref 0.3–6.2)
ERYTHROCYTE [DISTWIDTH] IN BLOOD BY AUTOMATED COUNT: 15.5 % (ref 12.3–15.4)
GLOBULIN UR ELPH-MCNC: 3.1 GM/DL
GLUCOSE BLDC GLUCOMTR-MCNC: 116 MG/DL (ref 70–130)
GLUCOSE BLDC GLUCOMTR-MCNC: 119 MG/DL (ref 70–130)
GLUCOSE BLDC GLUCOMTR-MCNC: 119 MG/DL (ref 70–130)
GLUCOSE BLDC GLUCOMTR-MCNC: 137 MG/DL (ref 70–130)
GLUCOSE SERPL-MCNC: 121 MG/DL (ref 65–99)
HCT VFR BLD AUTO: 34.2 % (ref 37.5–51)
HGB BLD-MCNC: 11.1 G/DL (ref 13–17.7)
IMM GRANULOCYTES # BLD AUTO: 0.04 10*3/MM3 (ref 0–0.05)
IMM GRANULOCYTES NFR BLD AUTO: 0.6 % (ref 0–0.5)
LYMPHOCYTES # BLD AUTO: 1.79 10*3/MM3 (ref 0.7–3.1)
LYMPHOCYTES NFR BLD AUTO: 27.5 % (ref 19.6–45.3)
MAGNESIUM SERPL-MCNC: 1.7 MG/DL (ref 1.6–2.6)
MCH RBC QN AUTO: 25.7 PG (ref 26.6–33)
MCHC RBC AUTO-ENTMCNC: 32.5 G/DL (ref 31.5–35.7)
MCV RBC AUTO: 79.2 FL (ref 79–97)
MONOCYTES # BLD AUTO: 0.64 10*3/MM3 (ref 0.1–0.9)
MONOCYTES NFR BLD AUTO: 9.8 % (ref 5–12)
NEUTROPHILS NFR BLD AUTO: 3.7 10*3/MM3 (ref 1.7–7)
NEUTROPHILS NFR BLD AUTO: 56.7 % (ref 42.7–76)
NRBC BLD AUTO-RTO: 0 /100 WBC (ref 0–0.2)
PHOSPHATE SERPL-MCNC: 2 MG/DL (ref 2.5–4.5)
PLATELET # BLD AUTO: 248 10*3/MM3 (ref 140–450)
PMV BLD AUTO: 9.5 FL (ref 6–12)
POTASSIUM SERPL-SCNC: 4.4 MMOL/L (ref 3.5–5.2)
PREALB SERPL-MCNC: 11.3 MG/DL (ref 20–40)
PROCALCITONIN SERPL-MCNC: 0.2 NG/ML (ref 0–0.25)
PROT SERPL-MCNC: 6.3 G/DL (ref 6–8.5)
RBC # BLD AUTO: 4.32 10*6/MM3 (ref 4.14–5.8)
SODIUM SERPL-SCNC: 137 MMOL/L (ref 136–145)
TRIGL SERPL-MCNC: 116 MG/DL (ref 0–150)
UNIT  ABO: NORMAL
UNIT  ABO: NORMAL
UNIT  RH: NORMAL
UNIT  RH: NORMAL
WBC NRBC COR # BLD: 6.52 10*3/MM3 (ref 3.4–10.8)

## 2022-10-09 PROCEDURE — 84145 PROCALCITONIN (PCT): CPT | Performed by: SURGERY

## 2022-10-09 PROCEDURE — 25010000002 FLUCONAZOLE PER 200 MG: Performed by: SURGERY

## 2022-10-09 PROCEDURE — 25010000002 THIAMINE PER 100 MG: Performed by: SURGERY

## 2022-10-09 PROCEDURE — 25010000002 CALCIUM GLUCONATE PER 10 ML: Performed by: SURGERY

## 2022-10-09 PROCEDURE — 25010000002 ENOXAPARIN PER 10 MG: Performed by: SURGERY

## 2022-10-09 PROCEDURE — 84134 ASSAY OF PREALBUMIN: CPT | Performed by: SURGERY

## 2022-10-09 PROCEDURE — 84478 ASSAY OF TRIGLYCERIDES: CPT | Performed by: SURGERY

## 2022-10-09 PROCEDURE — 80053 COMPREHEN METABOLIC PANEL: CPT | Performed by: SURGERY

## 2022-10-09 PROCEDURE — 84100 ASSAY OF PHOSPHORUS: CPT

## 2022-10-09 PROCEDURE — 86140 C-REACTIVE PROTEIN: CPT | Performed by: SURGERY

## 2022-10-09 PROCEDURE — 82962 GLUCOSE BLOOD TEST: CPT

## 2022-10-09 PROCEDURE — 99231 SBSQ HOSP IP/OBS SF/LOW 25: CPT | Performed by: INTERNAL MEDICINE

## 2022-10-09 PROCEDURE — 99024 POSTOP FOLLOW-UP VISIT: CPT | Performed by: SURGERY

## 2022-10-09 PROCEDURE — 25010000002 POTASSIUM CHLORIDE PER 2 MEQ OF POTASSIUM: Performed by: SURGERY

## 2022-10-09 PROCEDURE — 0 POTASSIUM CHLORIDE PER 2 MEQ: Performed by: SURGERY

## 2022-10-09 PROCEDURE — 85025 COMPLETE CBC W/AUTO DIFF WBC: CPT | Performed by: SURGERY

## 2022-10-09 PROCEDURE — 25010000002 PIPERACILLIN SOD-TAZOBACTAM PER 1 G: Performed by: SURGERY

## 2022-10-09 PROCEDURE — C1751 CATH, INF, PER/CENT/MIDLINE: HCPCS

## 2022-10-09 PROCEDURE — 83735 ASSAY OF MAGNESIUM: CPT

## 2022-10-09 PROCEDURE — 25010000002 MAGNESIUM SULFATE PER 500 MG OF MAGNESIUM: Performed by: SURGERY

## 2022-10-09 PROCEDURE — 82465 ASSAY BLD/SERUM CHOLESTEROL: CPT | Performed by: SURGERY

## 2022-10-09 PROCEDURE — 02HV33Z INSERTION OF INFUSION DEVICE INTO SUPERIOR VENA CAVA, PERCUTANEOUS APPROACH: ICD-10-PCS | Performed by: SURGERY

## 2022-10-09 PROCEDURE — C1894 INTRO/SHEATH, NON-LASER: HCPCS

## 2022-10-09 PROCEDURE — 83605 ASSAY OF LACTIC ACID: CPT | Performed by: SURGERY

## 2022-10-09 RX ORDER — SODIUM CHLORIDE 0.9 % (FLUSH) 0.9 %
10 SYRINGE (ML) INJECTION AS NEEDED
Status: DISCONTINUED | OUTPATIENT
Start: 2022-10-09 | End: 2022-10-11 | Stop reason: HOSPADM

## 2022-10-09 RX ORDER — SODIUM CHLORIDE 0.9 % (FLUSH) 0.9 %
20 SYRINGE (ML) INJECTION AS NEEDED
Status: DISCONTINUED | OUTPATIENT
Start: 2022-10-09 | End: 2022-10-11 | Stop reason: HOSPADM

## 2022-10-09 RX ORDER — PANTOPRAZOLE SODIUM 40 MG/10ML
40 INJECTION, POWDER, LYOPHILIZED, FOR SOLUTION INTRAVENOUS EVERY 12 HOURS SCHEDULED
Status: DISCONTINUED | OUTPATIENT
Start: 2022-10-09 | End: 2022-10-09

## 2022-10-09 RX ORDER — PANTOPRAZOLE SODIUM 40 MG/10ML
40 INJECTION, POWDER, LYOPHILIZED, FOR SOLUTION INTRAVENOUS EVERY 12 HOURS SCHEDULED
Status: DISCONTINUED | OUTPATIENT
Start: 2022-10-09 | End: 2022-10-11 | Stop reason: HOSPADM

## 2022-10-09 RX ORDER — SODIUM CHLORIDE 0.9 % (FLUSH) 0.9 %
10 SYRINGE (ML) INJECTION EVERY 12 HOURS SCHEDULED
Status: DISCONTINUED | OUTPATIENT
Start: 2022-10-09 | End: 2022-10-11 | Stop reason: HOSPADM

## 2022-10-09 RX ORDER — THIAMINE HYDROCHLORIDE 100 MG/ML
100 INJECTION, SOLUTION INTRAMUSCULAR; INTRAVENOUS DAILY
Status: DISCONTINUED | OUTPATIENT
Start: 2022-10-09 | End: 2022-10-10

## 2022-10-09 RX ORDER — FLUCONAZOLE 2 MG/ML
400 INJECTION, SOLUTION INTRAVENOUS DAILY
Status: DISCONTINUED | OUTPATIENT
Start: 2022-10-09 | End: 2022-10-11 | Stop reason: HOSPADM

## 2022-10-09 RX ORDER — FENTANYL/ROPIVACAINE/NS/PF 2-625MCG/1
15 PLASTIC BAG, INJECTION (ML) EPIDURAL AS NEEDED
Status: DISCONTINUED | OUTPATIENT
Start: 2022-10-09 | End: 2022-10-11 | Stop reason: HOSPADM

## 2022-10-09 RX ADMIN — THIAMINE HYDROCHLORIDE 100 MG: 100 INJECTION, SOLUTION INTRAMUSCULAR; INTRAVENOUS at 11:14

## 2022-10-09 RX ADMIN — FLUCONAZOLE 400 MG: 400 INJECTION, SOLUTION INTRAVENOUS at 13:05

## 2022-10-09 RX ADMIN — TAZOBACTAM SODIUM AND PIPERACILLIN SODIUM 3.38 G: 375; 3 INJECTION, SOLUTION INTRAVENOUS at 11:10

## 2022-10-09 RX ADMIN — ENOXAPARIN SODIUM 40 MG: 40 INJECTION SUBCUTANEOUS at 20:34

## 2022-10-09 RX ADMIN — CARVEDILOL 12.5 MG: 12.5 TABLET, FILM COATED ORAL at 09:07

## 2022-10-09 RX ADMIN — SODIUM PHOSPHATE, MONOBASIC, MONOHYDRATE 15 MMOL: 276; 142 INJECTION, SOLUTION INTRAVENOUS at 16:00

## 2022-10-09 RX ADMIN — POTASSIUM CHLORIDE AND SODIUM CHLORIDE 125 ML/HR: 450; 150 INJECTION, SOLUTION INTRAVENOUS at 13:05

## 2022-10-09 RX ADMIN — AMLODIPINE BESYLATE 10 MG: 10 TABLET ORAL at 09:07

## 2022-10-09 RX ADMIN — PANTOPRAZOLE SODIUM 40 MG: 40 INJECTION, POWDER, FOR SOLUTION INTRAVENOUS at 20:33

## 2022-10-09 RX ADMIN — PANTOPRAZOLE SODIUM 40 MG: 40 TABLET, DELAYED RELEASE ORAL at 06:01

## 2022-10-09 RX ADMIN — LOSARTAN POTASSIUM 100 MG: 50 TABLET, FILM COATED ORAL at 09:07

## 2022-10-09 RX ADMIN — ROSUVASTATIN CALCIUM 10 MG: 10 TABLET, FILM COATED ORAL at 09:07

## 2022-10-09 RX ADMIN — CARVEDILOL 12.5 MG: 12.5 TABLET, FILM COATED ORAL at 20:34

## 2022-10-09 RX ADMIN — ENOXAPARIN SODIUM 40 MG: 40 INJECTION SUBCUTANEOUS at 09:08

## 2022-10-09 RX ADMIN — POTASSIUM PHOSPHATE, MONOBASIC POTASSIUM PHOSPHATE, DIBASIC: 224; 236 INJECTION, SOLUTION, CONCENTRATE INTRAVENOUS at 17:38

## 2022-10-09 RX ADMIN — TAZOBACTAM SODIUM AND PIPERACILLIN SODIUM 3.38 G: 375; 3 INJECTION, SOLUTION INTRAVENOUS at 20:33

## 2022-10-09 RX ADMIN — POTASSIUM CHLORIDE AND SODIUM CHLORIDE 125 ML/HR: 450; 150 INJECTION, SOLUTION INTRAVENOUS at 04:34

## 2022-10-09 RX ADMIN — Medication 20 ML: at 20:34

## 2022-10-09 RX ADMIN — ACETAMINOPHEN 650 MG: 325 TABLET ORAL at 04:44

## 2022-10-09 NOTE — PLAN OF CARE
Goal Outcome Evaluation:           Progress: improving  Outcome Evaluation: VSS on RA. Complaints of pain, relieved by PRN medications. No complaints of nausea. Encouraged IS use and ambulation. Patient slept throughout the shift. No concerns at this time. Will continue to monitor

## 2022-10-09 NOTE — PROGRESS NOTES
Pharmacy Parenteral Nutrition Evaluation    Von Rodríguez is a  56 y.o. male receiving TPN.     Indication: sleeve leak  Consulting Physician: shea    Total Fluid Rate (if stated): n/a    Labs  Results from last 7 days   Lab Units 10/09/22  0605 10/08/22  0550 10/07/22  0644   SODIUM mmol/L 137 136 138   POTASSIUM mmol/L 4.4 4.5 4.4   CHLORIDE mmol/L 103 103 104   CO2 mmol/L 21.0* 21.0* 23.0   BUN mg/dL 11 15 18   CREATININE mg/dL 0.66* 0.95 0.79   CALCIUM mg/dL 8.9 9.0 9.0   BILIRUBIN mg/dL 0.6 0.5 0.4   ALK PHOS U/L 58 54 53   ALT (SGPT) U/L 58* 67* 88*   AST (SGOT) U/L 42* 45* 83*   GLUCOSE mg/dL 121* 126* 134*       Results from last 7 days   Lab Units 10/09/22  0605   MAGNESIUM mg/dL 1.7   PHOSPHORUS mg/dL 2.0*   PREALBUMIN mg/dL 11.3*       Results from last 7 days   Lab Units 10/09/22  0605 10/08/22  0550 10/07/22  0644   WBC 10*3/mm3 6.52 6.93 6.96   HEMOGLOBIN g/dL 11.1* 11.3* 11.7*   HEMATOCRIT % 34.2* 36.8* 37.6   PLATELETS 10*3/mm3 248 213 204       Triglycerides   Date Value Ref Range Status   10/09/2022 116 0 - 150 mg/dL Final     Comment:     Falsely depressed results may occur on samples drawn from patients receiving N-Acetylcysteine (NAC) or Metamizole.       estimated creatinine clearance is 200.9 mL/min (A) (by C-G formula based on SCr of 0.66 mg/dL (L)).    Intake & Output (last 3 days)         10/06 0701  10/07 0700 10/07 0701  10/08 0700 10/08 0701  10/09 0700 10/09 0701  10/10 0700    [address]  [address]      I.V. (mL/kg) 1.5 (0) 1.9 (0) 1000 (5.8)     IV Piggyback        Total Intake(mL/kg) 1.5 (0) 271.9 (1.6) 1000 (5.8)     Urine (mL/kg/hr) 400 (0.1) 0 (0) 0 (0)     Drains 40 80 190     Blood        Total Output 440 80 190     Net -438.5 +191.9 +810             Urine Unmeasured Occurrence 3 x 1 x 4 x             Dietitian Recommendations  Diet Orders (active) (From admission, onward)       Start     Ordered    10/08/22 7273  DIET MESSAGE Please bring pt sugar free lemonade. Tray replacement was  brought but it was not for the correct diet order. Thank you  Once        Comments: Please bring pt sugar free lemonade. Tray replacement was brought but it was not for the correct diet order. Thank you    10/08/22 0927    10/08/22 0847  DIET MESSAGE PLEASE BRING PATIENT A TRAY  Once        Comments: PLEASE BRING PATIENT A TRAY    10/08/22 0847    10/08/22 0804  DIET MESSAGE Patient did not receive tray  Once        Comments: Patient did not receive tray    10/08/22 0804    10/06/22 1000  Diet Bariatric; Sleeve; 1; Stage 1 Clear Liquid Sugar Free (No Carbonation, No Straw)  Diet Effective 1000        Comments: Stage I bariatric clear liquid, no high fructose corn syrup products or carbonation.  May have caffeine and straws. Once taking po, patient may take their own milk based protein shakes if desired    10/05/22 1206    10/05/22 1600  Snack: Chewing Gum Three Times Daily x30 Minutes to Increase Saliva Flow and Provide Gas Pain Relief  3 Times Daily      Comments: Chewing Gum Three Times Daily x30 Minutes to Increase Saliva Flow and Provide Gas Pain Relief    10/05/22 1206                    Current TPN Regimen Recommendation:  Dextrose 12.5% / Amino Acid 5.5% at goal rate of 80 mL/hr.  20% IntraLipid Emulsion 250mL three times weekly.    Assessment/Plan:    1. Patient to start TPN for sleeve leak. Macronutrients per dietary recommendation are listed above. Will advance slowly starting at 20mL/hour and increase by 20mL/hour every 8 hours until goal rate is reached. Of note, will not be at goal until new bag tomorrow.   2. Will begin with standard electrolytes and 1:1 acid base. Will add electrolyte replacements as needed. Sliding scale insulin is available.   3. Pharmacy will continue to follow and adjust as indicated     Юлия Erickson, PharmD, BCPS  10/9/2022  14:44 EDT

## 2022-10-09 NOTE — PROGRESS NOTES
[facility] Medicine Services  PROGRESS NOTE    Patient Name: Von Rodríguez  : 1965  MRN: [id number]    Date of Admission: 10/5/2022  Primary Care Physician: Provider, No Known    Subjective   Subjective     CC:  Med management post op sleeve     HPI:  Getting PICC during visit. D/w nursing. VSS, Tmax 99.1    ROS:  uto        Objective   Objective     Vital Signs:   Temp:  [98.1 °F (36.7 °C)-99.1 °F (37.3 °C)] 98.9 °F (37.2 °C)  Heart Rate:  [] 96  Resp:  [18] 18  BP: (117-152)/(79-98) 117/79     Physical Exam:  Getting PICC line during visit  No exam     Results Reviewed:  LAB RESULTS:      Lab 10/09/22  1156 10/09/22  0605 10/08/22  0550 10/07/22  0644 10/06/22  0725 10/05/22  1913   WBC  --  6.52 6.93 6.96 8.31  --    HEMOGLOBIN  --  11.1* 11.3* 11.7* 12.4* 13.5   HEMATOCRIT  --  34.2* 36.8* 37.6 39.5 43.6   PLATELETS  --  248 213 204 218  --    NEUTROS ABS  --  3.70 3.88 4.54 6.45  --    IMMATURE GRANS (ABS)  --  0.04 0.02 0.02 0.02  --    LYMPHS ABS  --  1.79 2.19 1.62 0.97  --    MONOS ABS  --  0.64 0.71 0.76 0.87  --    EOS ABS  --  0.32 0.10 0.01 0.00  --    MCV  --  79.2 81.8 81.7 80.8  --    CRP  --  13.13*  --   --   --   --    PROCALCITONIN 0.20  --   --   --   --   --          Lab 10/09/22  0605 10/08/22  0550 10/07/22  0644 10/06/22  0725   SODIUM 137 136 138 139   POTASSIUM 4.4 4.5 4.4 4.9   CHLORIDE 103 103 104 103   CO2 21.0* 21.0* 23.0 25.0   ANION GAP 13.0 12.0 11.0 11.0   BUN 11 15 18 22*   CREATININE 0.66* 0.95 0.79 1.02   EGFR 111.5 95.1 105.6 87.3   GLUCOSE 121* 126* 134* 157*   CALCIUM 8.9 9.0 9.0 9.1   MAGNESIUM 1.7  --   --   --    PHOSPHORUS 2.0*  --   --   --          Lab 10/09/22  0605 10/08/22  0550 10/07/22  0644 10/06/22  0725   TOTAL PROTEIN 6.3 6.6 6.1 6.5   ALBUMIN 3.20* 3.30* 3.50 4.00   GLOBULIN 3.1 3.3 2.6 2.5   ALT (SGPT) 58* 67* 88* 108*   AST (SGOT) 42* 45* 83* 92*   BILIRUBIN 0.6 0.5 0.4 0.3   ALK PHOS 58 54 53 48             Lab  10/09/22  0605   CHOLESTEROL 103   TRIGLYCERIDES 116         Lab 10/06/22  0725 10/05/22  1015   IRON 20*  --    ABO TYPING  --  AB   RH TYPING  --  Positive   ANTIBODY SCREEN  --  Negative         Brief Urine Lab Results     None          Microbiology Results Abnormal     None          CT Abdomen Pelvis With Contrast    Result Date: 10/8/2022  DATE OF EXAM: 10/8/2022 3:25 PM  PROCEDURE: CT ABDOMEN PELVIS W CONTRAST-  INDICATIONS: post op #3 open sleeve gastrectomy; K44.9-Diaphragmatic hernia without obstruction or gangrene; Z68.43-Body mass index (BMI) 50.0-59.9, adult  COMPARISON: 10/6/2022 upper GI series  TECHNIQUE: Routine transaxial slices were obtained through the abdomen and pelvis after the intravenous administration of 100 mL of Isovue 300. Reconstructed coronal and sagittal images were also obtained. Automated exposure control and iterative construction methods were used.  The radiation dose reduction device was turned on for each scan per the ALARA (As Low as Reasonably Achievable) protocol.  FINDINGS: Lower thorax:Lung bases are clear. No coronary artery calcifications seen in the visualized heart. No pericardial effusion.  No evidence of pulmonary embolus in the visualized pulmonary arteries.  Liver: No focal hepatic lesions seen.  Normal hepatic size. Hypodensity of the liver suggestive of steatosis.  Gallbladder and bile ducts: Cholecystectomy clips. No intra- or extra- hepatic biliary ductal dilatation.  Spleen: Normal appearance of the spleen.  Pancreas: Normal appearance of the pancreas. Main pancreatic duct is nondilated.  Adrenals: Normal appearance of the adrenal glands.  Kidneys: Bilateral renal cysts. Symmetric enhancement and excretion of contrast. No nephrolithiasis.  Normal caliber of the ureters.  Bowel: Postoperative changes of vertical sleeve gastrectomy. Normal caliber of the bowel. High density material to the colon is likely from recent fluoroscopic study. Appendix is not visualized  but no secondary signs of appendicitis. Small hiatal hernia.  Pelvis: Normal appearance of the bladder. Prostatomegaly. Seminal vesicles appear normal.  Peritoneum: Stranding seen along the upper abdomen. Small amount of free fluid in the pelvis and in the perisplenic space. There is a small focal fluid collection adjacent to the distal greater curvature of the stomach measuring approximately 4.6 x 3.6 x 2.2 cm. No rim enhancement. No free air. No peritoneal nodularity. A surgical drain is seen within the upper abdomen.  Vessels: Normal aortic caliber.  No atherosclerotic calcification of the aorta. Celiac artery, splenic artery, superior mesenteric artery, inferior mesenteric artery, renal arteries and iliac arteries appear patent. Iliac veins, inferior vena cava, superior mesenteric vein, renal veins, portal vein and splenic vein are patent.  Lymph nodes: No enlarged or suspicious adenopathy.  Bones: No acute osseous abnormality. Degenerative changes of the spine.  Soft tissues: Stranding and gas seen within the upper abdominal wall consistent with recent incision. Surgical skin staples seen. Diffuse body wall edema seen.      Impression: Postoperative changes of vertical sleeve gastrectomy with associated surrounding fat stranding and surgical drain.  Adjacent to the distal greater curvature there is a small focal fluid collection measuring up to 4.6 cm. No rim enhancement. This may represent a postoperative seroma though developing phlegmon/infection cannot be fully excluded  This report was finalized on 10/8/2022 10:09 PM by García Little.            I have reviewed the medications:  Scheduled Meds:amLODIPine, 10 mg, Oral, Q24H  carvedilol, 12.5 mg, Oral, Q12H  enoxaparin, 40 mg, Subcutaneous, Q12H  fluconazole, 400 mg, Intravenous, Daily  insulin lispro, 0-9 Units, Subcutaneous, 4x Daily AC & at Bedtime  losartan, 100 mg, Oral, Q24H  pantoprazole, 40 mg, Intravenous, Q12H  piperacillin-tazobactam, 3.375 g,  Intravenous, Q8H  rosuvastatin, 10 mg, Oral, Daily  IV Fluids 1000 mL + additives, 100 mL/hr, Intravenous, Once  thiamine (B-1) IV, 100 mg, Intravenous, Daily      Continuous Infusions:Adult Peripheral 2-in-1 TPN,   lactated ringers, 150 mL/hr, Last Rate: 150 mL/hr (10/05/22 1613)  Pharmacy to Dose TPN,   sodium chloride 0.45 % with KCl 20 mEq, 125 mL/hr, Last Rate: 125 mL/hr (10/09/22 0434)      PRN Meds:.•  acetaminophen  •  Albuterol Sulfate NEB Orderable  •  ALPRAZolam  •  benzonatate  •  diphenhydrAMINE  •  hydrALAZINE  •  HYDROmorphone **AND** naloxone  •  HYDROmorphone  •  LORazepam  •  LORazepam  •  Morphine **AND** naloxone  •  naloxone  •  [] ondansetron **FOLLOWED BY** ondansetron  •  ondansetron  •  oxyCODONE  •  Pharmacy to Dose TPN  •  phenol  •  prochlorperazine  •  promethazine  •  simethicone    Assessment & Plan   Assessment & Plan     Active Hospital Problems    Diagnosis  POA   • **Morbid obesity with body mass index (BMI) of 50.0 to 59.9 in adult ([facility]) [E66.01, Z68.43]  Not Applicable   • Hypertension [I10]  Yes   • Other hyperlipidemia [E78.49]  Yes   • JAD (obstructive sleep apnea) [G47.33]  Yes   • Type 2 diabetes mellitus without complication, with long-term current use of insulin ([facility]) [E11.9, Z79.4]  Not Applicable   • GERD (gastroesophageal reflux disease) [K21.9]  Yes   • Benign paroxysmal positional vertigo [H81.10]  Yes   • Asthma [J45.909]  Yes      Resolved Hospital Problems    Diagnosis Date Resolved POA   • Hiatal hernia [K44.9] 10/05/2022 Unknown        Brief Hospital Course to date:  Von Rodríguez is a 56 y.o. male with a past medical history significant for hypertension, HLD, DM2, BPPV, GERD, asthma, JAD, and morbid obesity. Presents as a consult from Dr. Connelly. He is less than 24 hours s/p laparoscopic gastric sleeve and EGD. He is stable. Tolerated procedure well. Pain is controlled with PCA. Nurse is reporting oxygenation does drop mildly while patient is sleeping. He  has worn CPAP remotely but wishes to avoid it if possible. Reports he still feels groggy from anesthesia, but otherwise no complaints. No fever, cough, congestion, SOB, or chest pain. No headache or focal weakness/paratehesias. Blood pressure has been elevated slightly. Antihypertensives resumed by bariatric service. Will continue to follow for medical management    Morbid Obesity S/P Lap Gastric Sleeve  Fever, CT imaging with post-op focal fluid collection 4.6 cm  - POD4 ( Connelly), low grade fever 10/8 and CT imaging with small focal fluid collection  - defer to bariatric surgery, TPN/PICC ordered today  - currently on IV abx   - close monitor on telemetry  - having bowel fxn      Hypertension  HLD  - currently stable --in fact on the lower side but adequate.   - resume Norvasc 10 mg, Coreg 12.5 mg, and cozaar 100 mg  - at time of discharge would likely hold on HCTZ component of hyzaar. Remainder of meds unchanged. Will write new Rx for losartan given he normally takes in combination form      DM2  - glucose stable. Hb A1c 6.2  - ok to resume home regimen unchanged at discharge     Asthma  JAD  - continue IS  - duo nebs with additional pulmonary toilet as needed- on RA during visit this AM  - encourage CPAP         Thank you for allowing [facility] Medicine Service to provide consultative care for your patient, we will continue to follow while clinically appropriate.     Junie Reece MD  10/09/22

## 2022-10-09 NOTE — PLAN OF CARE
Goal Outcome Evaluation:  Plan of Care Reviewed With: patient        Progress: improving  Outcome Evaluation: VSS, room air. PICC line placed today with plans for TPN. Abx infusing. No complaints of nausea. Patient is unable to tolerate PO. Patient has some pain but does not want any pain medication for this. Gas X offered for gas pain relief but patient stated this does not help. Plan of care discussed with the patient and wife at bedside.

## 2022-10-09 NOTE — PROGRESS NOTES
"Cc: POD#4 laparoscopic converted to open vertical sleeve gastrectomy  \"feel ok\"    His girlfriend is in the room.  He is in bed and says he feels a little better.  Less pain with drinking.  He says he has no appetite and not taking in much.  Passing flatus which made him feel better as well.  He says he is ambulating and voiding well.  No pulmonary complaints.  He is concerned that I ordered procalcitonin and lactate, PICC line etc but appreciates that I wanted to get things going before I could sit down and talk to him so as not to delay on a Sunday.    T-max 99.1 currently 98.9 pulse 96 respirations 18 blood pressure 117/79 saturation 96% UO NR   SS he looks nontoxic/no apparent distress and conversant.  Abdomen is soft and appropriate, nontender, bowel sounds present.  Wounds all healing without evidence of cellulitis/infection or drainage.    Lactate and procalcitonin normal at 1.3 and 0.2 respectively.  CMP normal except for glucose of 121 creatinine of 0.66 CO2 21 albumin 3.2 ALT 58 AST 42.  Prealbumin is low at 11.3.  White blood count normal at 6.52 with a normal differential.  H&H 11.1 and 34.2, microcytic indices.  Cholesterol and triglycerides normal.  C-reactive protein elevated at 13.13.  Magnesium normal at 1.7 phosphorus low at 2.0.    CT scan images and report reviewed.  Adjacent to the distal greater curvature a small focal fluid collection measuring 4.6 cm without rim enhancement which may represent postoperative seroma or developing phlegmon/infection cannot be excluded.  RIP drain appears to be in good position on my review.    Impression: Postop day 4 laparoscopic converted to open sleeve gastrectomy.  His postprandial pain has improved.  CT abnormal as above and at risk for developing an early leak, especially with low prealbumin and diabetes.  Elevated CRP.  Right now normal white count, procalcitonin, and lactate level.  Slight improvement in temperature curve.  Slight improvement in " transaminases.    Plan: Discussed my concerns with the patient and his girlfriend.  He is agreeable with the plan to treat as best as possible for leak prevention/early unrecognized leak.  Change oral Protonix to IV 40 mg every 12 hours.  Daily IV thiamine.  Empiric intravenous Zosyn and Diflucan.  PICC line, TPN and lipids.  Recheck in upper GI in the morning.  Continue liquids as tolerated.  Continue RIP drain.  Continue close observation in telemetry.  See orders.

## 2022-10-10 ENCOUNTER — APPOINTMENT (OUTPATIENT)
Dept: GENERAL RADIOLOGY | Facility: HOSPITAL | Age: 57
End: 2022-10-10

## 2022-10-10 ENCOUNTER — APPOINTMENT (OUTPATIENT)
Dept: CT IMAGING | Facility: HOSPITAL | Age: 57
End: 2022-10-10

## 2022-10-10 LAB
ALBUMIN SERPL-MCNC: 3.3 G/DL (ref 3.5–5.2)
ALBUMIN/GLOB SERPL: 1.3 G/DL
ALP SERPL-CCNC: 57 U/L (ref 39–117)
ALT SERPL W P-5'-P-CCNC: 57 U/L (ref 1–41)
ANION GAP SERPL CALCULATED.3IONS-SCNC: 13 MMOL/L (ref 5–15)
AST SERPL-CCNC: 36 U/L (ref 1–40)
BASOPHILS # BLD AUTO: 0.03 10*3/MM3 (ref 0–0.2)
BASOPHILS NFR BLD AUTO: 0.5 % (ref 0–1.5)
BILIRUB SERPL-MCNC: 0.6 MG/DL (ref 0–1.2)
BUN SERPL-MCNC: 9 MG/DL (ref 6–20)
BUN/CREAT SERPL: 12.5 (ref 7–25)
CA-I SERPL ISE-MCNC: 1.35 MMOL/L (ref 1.12–1.32)
CALCIUM SPEC-SCNC: 9 MG/DL (ref 8.6–10.5)
CHLORIDE SERPL-SCNC: 103 MMOL/L (ref 98–107)
CO2 SERPL-SCNC: 23 MMOL/L (ref 22–29)
CREAT SERPL-MCNC: 0.72 MG/DL (ref 0.76–1.27)
D-LACTATE SERPL-SCNC: 0.9 MMOL/L (ref 0.5–2)
DEPRECATED RDW RBC AUTO: 44.3 FL (ref 37–54)
EGFRCR SERPLBLD CKD-EPI 2021: 108.6 ML/MIN/1.73
EOSINOPHIL # BLD AUTO: 0.41 10*3/MM3 (ref 0–0.4)
EOSINOPHIL NFR BLD AUTO: 6.9 % (ref 0.3–6.2)
ERYTHROCYTE [DISTWIDTH] IN BLOOD BY AUTOMATED COUNT: 15.4 % (ref 12.3–15.4)
GLOBULIN UR ELPH-MCNC: 2.5 GM/DL
GLUCOSE BLDC GLUCOMTR-MCNC: 145 MG/DL (ref 70–130)
GLUCOSE BLDC GLUCOMTR-MCNC: 159 MG/DL (ref 70–130)
GLUCOSE BLDC GLUCOMTR-MCNC: 161 MG/DL (ref 70–130)
GLUCOSE BLDC GLUCOMTR-MCNC: 166 MG/DL (ref 70–130)
GLUCOSE SERPL-MCNC: 158 MG/DL (ref 65–99)
HCT VFR BLD AUTO: 33.3 % (ref 37.5–51)
HGB BLD-MCNC: 10.7 G/DL (ref 13–17.7)
IMM GRANULOCYTES # BLD AUTO: 0.04 10*3/MM3 (ref 0–0.05)
IMM GRANULOCYTES NFR BLD AUTO: 0.7 % (ref 0–0.5)
LYMPHOCYTES # BLD AUTO: 1.63 10*3/MM3 (ref 0.7–3.1)
LYMPHOCYTES NFR BLD AUTO: 27.4 % (ref 19.6–45.3)
MAGNESIUM SERPL-MCNC: 1.6 MG/DL (ref 1.6–2.6)
MCH RBC QN AUTO: 25.4 PG (ref 26.6–33)
MCHC RBC AUTO-ENTMCNC: 32.1 G/DL (ref 31.5–35.7)
MCV RBC AUTO: 79.1 FL (ref 79–97)
MONOCYTES # BLD AUTO: 0.58 10*3/MM3 (ref 0.1–0.9)
MONOCYTES NFR BLD AUTO: 9.7 % (ref 5–12)
NEUTROPHILS NFR BLD AUTO: 3.26 10*3/MM3 (ref 1.7–7)
NEUTROPHILS NFR BLD AUTO: 54.8 % (ref 42.7–76)
NRBC BLD AUTO-RTO: 0 /100 WBC (ref 0–0.2)
PHOSPHATE SERPL-MCNC: 2.8 MG/DL (ref 2.5–4.5)
PLATELET # BLD AUTO: 257 10*3/MM3 (ref 140–450)
PMV BLD AUTO: 8.7 FL (ref 6–12)
POTASSIUM SERPL-SCNC: 4 MMOL/L (ref 3.5–5.2)
PREALB SERPL-MCNC: 10.7 MG/DL (ref 20–40)
PROCALCITONIN SERPL-MCNC: 0.22 NG/ML (ref 0–0.25)
PROT SERPL-MCNC: 5.8 G/DL (ref 6–8.5)
RBC # BLD AUTO: 4.21 10*6/MM3 (ref 4.14–5.8)
SODIUM SERPL-SCNC: 139 MMOL/L (ref 136–145)
WBC NRBC COR # BLD: 5.95 10*3/MM3 (ref 3.4–10.8)

## 2022-10-10 PROCEDURE — 82962 GLUCOSE BLOOD TEST: CPT

## 2022-10-10 PROCEDURE — 74240 X-RAY XM UPR GI TRC 1CNTRST: CPT

## 2022-10-10 PROCEDURE — 82330 ASSAY OF CALCIUM: CPT | Performed by: SURGERY

## 2022-10-10 PROCEDURE — 0 POTASSIUM CHLORIDE PER 2 MEQ: Performed by: SURGERY

## 2022-10-10 PROCEDURE — 25010000002 MAGNESIUM SULFATE PER 500 MG OF MAGNESIUM

## 2022-10-10 PROCEDURE — 25010000002 DICYCLOMINE PER 20 MG: Performed by: NURSE PRACTITIONER

## 2022-10-10 PROCEDURE — 25010000002 PIPERACILLIN SOD-TAZOBACTAM PER 1 G: Performed by: SURGERY

## 2022-10-10 PROCEDURE — 83735 ASSAY OF MAGNESIUM: CPT | Performed by: SURGERY

## 2022-10-10 PROCEDURE — 85025 COMPLETE CBC W/AUTO DIFF WBC: CPT | Performed by: SURGERY

## 2022-10-10 PROCEDURE — 0 DIATRIZOATE MEGLUMINE & SODIUM PER 1 ML

## 2022-10-10 PROCEDURE — 99233 SBSQ HOSP IP/OBS HIGH 50: CPT | Performed by: NURSE PRACTITIONER

## 2022-10-10 PROCEDURE — 74150 CT ABDOMEN W/O CONTRAST: CPT

## 2022-10-10 PROCEDURE — 99222 1ST HOSP IP/OBS MODERATE 55: CPT | Performed by: INTERNAL MEDICINE

## 2022-10-10 PROCEDURE — 25010000002 POTASSIUM CHLORIDE PER 2 MEQ OF POTASSIUM

## 2022-10-10 PROCEDURE — 63710000001 INSULIN LISPRO (HUMAN) PER 5 UNITS: Performed by: SURGERY

## 2022-10-10 PROCEDURE — 25010000002 ENOXAPARIN PER 10 MG: Performed by: SURGERY

## 2022-10-10 PROCEDURE — 99024 POSTOP FOLLOW-UP VISIT: CPT | Performed by: SURGERY

## 2022-10-10 PROCEDURE — 83605 ASSAY OF LACTIC ACID: CPT | Performed by: SURGERY

## 2022-10-10 PROCEDURE — 25010000002 FLUCONAZOLE PER 200 MG: Performed by: SURGERY

## 2022-10-10 PROCEDURE — 0 MAGNESIUM SULFATE 4 GM/100ML SOLUTION: Performed by: SURGERY

## 2022-10-10 PROCEDURE — 84134 ASSAY OF PREALBUMIN: CPT | Performed by: SURGERY

## 2022-10-10 PROCEDURE — 84100 ASSAY OF PHOSPHORUS: CPT | Performed by: SURGERY

## 2022-10-10 PROCEDURE — 80053 COMPREHEN METABOLIC PANEL: CPT | Performed by: SURGERY

## 2022-10-10 PROCEDURE — 25010000002 THIAMINE PER 100 MG: Performed by: SURGERY

## 2022-10-10 PROCEDURE — 25010000002 CALCIUM GLUCONATE PER 10 ML

## 2022-10-10 PROCEDURE — 84145 PROCALCITONIN (PCT): CPT | Performed by: SURGERY

## 2022-10-10 RX ORDER — ALUMINA, MAGNESIA, AND SIMETHICONE 2400; 2400; 240 MG/30ML; MG/30ML; MG/30ML
15 SUSPENSION ORAL EVERY 6 HOURS PRN
Status: DISCONTINUED | OUTPATIENT
Start: 2022-10-10 | End: 2022-10-11 | Stop reason: HOSPADM

## 2022-10-10 RX ORDER — MAGNESIUM SULFATE HEPTAHYDRATE 40 MG/ML
2 INJECTION, SOLUTION INTRAVENOUS AS NEEDED
Status: DISCONTINUED | OUTPATIENT
Start: 2022-10-10 | End: 2022-10-11 | Stop reason: HOSPADM

## 2022-10-10 RX ORDER — DICYCLOMINE HYDROCHLORIDE 10 MG/ML
20 INJECTION INTRAMUSCULAR 4 TIMES DAILY
Status: DISCONTINUED | OUTPATIENT
Start: 2022-10-10 | End: 2022-10-11 | Stop reason: HOSPADM

## 2022-10-10 RX ORDER — ALUMINA, MAGNESIA, AND SIMETHICONE 2400; 2400; 240 MG/30ML; MG/30ML; MG/30ML
15 SUSPENSION ORAL EVERY 6 HOURS
Status: DISCONTINUED | OUTPATIENT
Start: 2022-10-10 | End: 2022-10-10

## 2022-10-10 RX ORDER — MAGNESIUM SULFATE HEPTAHYDRATE 40 MG/ML
4 INJECTION, SOLUTION INTRAVENOUS AS NEEDED
Status: DISCONTINUED | OUTPATIENT
Start: 2022-10-10 | End: 2022-10-11 | Stop reason: HOSPADM

## 2022-10-10 RX ORDER — THIAMINE HYDROCHLORIDE 100 MG/ML
200 INJECTION, SOLUTION INTRAMUSCULAR; INTRAVENOUS 3 TIMES DAILY
Status: DISCONTINUED | OUTPATIENT
Start: 2022-10-10 | End: 2022-10-11 | Stop reason: HOSPADM

## 2022-10-10 RX ORDER — SIMETHICONE 80 MG
80 TABLET,CHEWABLE ORAL 4 TIMES DAILY
Status: DISCONTINUED | OUTPATIENT
Start: 2022-10-10 | End: 2022-10-11 | Stop reason: HOSPADM

## 2022-10-10 RX ORDER — GUAIFENESIN/DEXTROMETHORPHAN 100-10MG/5
5 SYRUP ORAL EVERY 4 HOURS PRN
Status: DISCONTINUED | OUTPATIENT
Start: 2022-10-10 | End: 2022-10-11 | Stop reason: HOSPADM

## 2022-10-10 RX ADMIN — ENOXAPARIN SODIUM 40 MG: 40 INJECTION SUBCUTANEOUS at 21:45

## 2022-10-10 RX ADMIN — CARVEDILOL 12.5 MG: 12.5 TABLET, FILM COATED ORAL at 08:49

## 2022-10-10 RX ADMIN — AMLODIPINE BESYLATE 10 MG: 10 TABLET ORAL at 08:49

## 2022-10-10 RX ADMIN — PANTOPRAZOLE SODIUM 40 MG: 40 INJECTION, POWDER, FOR SOLUTION INTRAVENOUS at 21:41

## 2022-10-10 RX ADMIN — MAGNESIUM SULFATE HEPTAHYDRATE 4 G: 40 INJECTION, SOLUTION INTRAVENOUS at 14:12

## 2022-10-10 RX ADMIN — SMOFLIPID 100 ML: 6; 6; 5; 3 INJECTION, EMULSION INTRAVENOUS at 23:29

## 2022-10-10 RX ADMIN — FLUCONAZOLE 400 MG: 400 INJECTION, SOLUTION INTRAVENOUS at 08:50

## 2022-10-10 RX ADMIN — TAZOBACTAM SODIUM AND PIPERACILLIN SODIUM 3.38 G: 375; 3 INJECTION, SOLUTION INTRAVENOUS at 21:43

## 2022-10-10 RX ADMIN — INSULIN LISPRO 2 UNITS: 100 INJECTION, SOLUTION INTRAVENOUS; SUBCUTANEOUS at 21:43

## 2022-10-10 RX ADMIN — INSULIN LISPRO 2 UNITS: 100 INJECTION, SOLUTION INTRAVENOUS; SUBCUTANEOUS at 12:51

## 2022-10-10 RX ADMIN — FOLIC ACID 1 MG: 5 INJECTION, SOLUTION INTRAMUSCULAR; INTRAVENOUS; SUBCUTANEOUS at 18:03

## 2022-10-10 RX ADMIN — SMOFLIPID 100 ML: 6; 6; 5; 3 INJECTION, EMULSION INTRAVENOUS at 18:03

## 2022-10-10 RX ADMIN — LOSARTAN POTASSIUM 100 MG: 50 TABLET, FILM COATED ORAL at 08:49

## 2022-10-10 RX ADMIN — Medication 10 ML: at 08:49

## 2022-10-10 RX ADMIN — TAZOBACTAM SODIUM AND PIPERACILLIN SODIUM 3.38 G: 375; 3 INJECTION, SOLUTION INTRAVENOUS at 12:51

## 2022-10-10 RX ADMIN — DICYCLOMINE HYDROCHLORIDE 20 MG: 20 INJECTION INTRAMUSCULAR at 12:51

## 2022-10-10 RX ADMIN — PANTOPRAZOLE SODIUM 40 MG: 40 INJECTION, POWDER, FOR SOLUTION INTRAVENOUS at 08:49

## 2022-10-10 RX ADMIN — CARVEDILOL 12.5 MG: 12.5 TABLET, FILM COATED ORAL at 21:41

## 2022-10-10 RX ADMIN — POTASSIUM CHLORIDE AND SODIUM CHLORIDE 125 ML/HR: 450; 150 INJECTION, SOLUTION INTRAVENOUS at 23:29

## 2022-10-10 RX ADMIN — POTASSIUM CHLORIDE AND SODIUM CHLORIDE 125 ML/HR: 450; 150 INJECTION, SOLUTION INTRAVENOUS at 08:48

## 2022-10-10 RX ADMIN — Medication 10 ML: at 21:43

## 2022-10-10 RX ADMIN — ENOXAPARIN SODIUM 40 MG: 40 INJECTION SUBCUTANEOUS at 08:48

## 2022-10-10 RX ADMIN — POTASSIUM PHOSPHATE, MONOBASIC POTASSIUM PHOSPHATE, DIBASIC: 224; 236 INJECTION, SOLUTION, CONCENTRATE INTRAVENOUS at 18:04

## 2022-10-10 RX ADMIN — TAZOBACTAM SODIUM AND PIPERACILLIN SODIUM 3.38 G: 375; 3 INJECTION, SOLUTION INTRAVENOUS at 04:26

## 2022-10-10 RX ADMIN — THIAMINE HYDROCHLORIDE 200 MG: 100 INJECTION, SOLUTION INTRAMUSCULAR; INTRAVENOUS at 18:04

## 2022-10-10 RX ADMIN — ROSUVASTATIN CALCIUM 10 MG: 10 TABLET, FILM COATED ORAL at 08:49

## 2022-10-10 RX ADMIN — THIAMINE HYDROCHLORIDE 100 MG: 100 INJECTION, SOLUTION INTRAMUSCULAR; INTRAVENOUS at 08:49

## 2022-10-10 RX ADMIN — THIAMINE HYDROCHLORIDE 200 MG: 100 INJECTION, SOLUTION INTRAMUSCULAR; INTRAVENOUS at 23:28

## 2022-10-10 NOTE — PROGRESS NOTES
Pharmacy Parenteral Nutrition Evaluation    Von Rodríguez is a  56 y.o. male receiving TPN.     Indication: sleeve leak  Consulting Physician: shea    Total Fluid Rate (if stated): n/a    Labs  Results from last 7 days   Lab Units 10/10/22  0619 10/09/22  0605 10/08/22  0550   SODIUM mmol/L 139 137 136   POTASSIUM mmol/L 4.0 4.4 4.5   CHLORIDE mmol/L 103 103 103   CO2 mmol/L 23.0 21.0* 21.0*   BUN mg/dL 9 11 15   CREATININE mg/dL 0.72* 0.66* 0.95   CALCIUM mg/dL 9.0 8.9 9.0   BILIRUBIN mg/dL 0.6 0.6 0.5   ALK PHOS U/L 57 58 54   ALT (SGPT) U/L 57* 58* 67*   AST (SGOT) U/L 36 42* 45*   GLUCOSE mg/dL 158* 121* 126*       Results from last 7 days   Lab Units 10/10/22  0619 10/09/22  0605   MAGNESIUM mg/dL 1.6 1.7   PHOSPHORUS mg/dL 2.8 2.0*   PREALBUMIN mg/dL 10.7* 11.3*       Results from last 7 days   Lab Units 10/10/22  0619 10/09/22  0605 10/08/22  0550   WBC 10*3/mm3 5.95 6.52 6.93   HEMOGLOBIN g/dL 10.7* 11.1* 11.3*   HEMATOCRIT % 33.3* 34.2* 36.8*   PLATELETS 10*3/mm3 257 248 213       Triglycerides   Date Value Ref Range Status   10/09/2022 116 0 - 150 mg/dL Final     Comment:     Falsely depressed results may occur on samples drawn from patients receiving N-Acetylcysteine (NAC) or Metamizole.       estimated creatinine clearance is 184.1 mL/min (A) (by C-G formula based on SCr of 0.72 mg/dL (L)).    Intake & Output (last 3 days)         10/07 0701  10/08 0700 10/08 0701  10/09 0700 10/09 0701  10/10 0700 10/10 0701  10/11 0700    P.O. 270       I.V. (mL/kg) 1.9 (0) 1000 (5.8)      Total Intake(mL/kg) 271.9 (1.6) 1000 (5.8)      Urine (mL/kg/hr) 0 (0) 0 (0)  0 (0)    Drains 80 190  40    Stool    0    Total Output 80 190  40    Net +191.9 +810  -40            Urine Unmeasured Occurrence 1 x 4 x 3 x 1 x    Stool Unmeasured Occurrence    1 x            Dietitian Recommendations  Diet Orders (active) (From admission, onward)       Start     Ordered    10/08/22 8209  DIET MESSAGE Please bring pt sugar free  lemonade. Tray replacement was brought but it was not for the correct diet order. Thank you  Once        Comments: Please bring pt sugar free lemonade. Tray replacement was brought but it was not for the correct diet order. Thank you    10/08/22 0927    10/08/22 0847  DIET MESSAGE PLEASE BRING PATIENT A TRAY  Once        Comments: PLEASE BRING PATIENT A TRAY    10/08/22 0847    10/08/22 0804  DIET MESSAGE Patient did not receive tray  Once        Comments: Patient did not receive tray    10/08/22 0804    10/06/22 1000  Diet Bariatric; Sleeve; 1; Stage 1 Clear Liquid Sugar Free (No Carbonation, No Straw)  Diet Effective 1000        Comments: Stage I bariatric clear liquid, no high fructose corn syrup products or carbonation.  May have caffeine and straws. Once taking po, patient may take their own milk based protein shakes if desired    10/05/22 1206    10/05/22 1600  Snack: Chewing Gum Three Times Daily x30 Minutes to Increase Saliva Flow and Provide Gas Pain Relief  3 Times Daily      Comments: Chewing Gum Three Times Daily x30 Minutes to Increase Saliva Flow and Provide Gas Pain Relief    10/05/22 1206                    Current TPN Regimen Recommendation:  Dextrose 13% / Amino Acid 7.5% at goal rate of 80 mL/hr.  20% SMOF lipid 200mL daily.    Assessment/Plan:    1. Patient on TPN for sleeve leak. Macronutrients per dietary recommendation are listed above. Plan to start TPN at goal rate of 80 mL/hr.   2. Will continue with standard electrolytes and 1:1 acid base. Will add electrolyte replacements as needed. Sliding scale insulin is available.   3. Pharmacy will continue to follow and adjust as indicated.    Thank you,  Jason Sanchez, PharmD  Pharmacy Resident   10/10/2022  13:23 EDT

## 2022-10-10 NOTE — CONSULTS
[facility]    Referring Provider: Pete Connelly MD    Primary Care Provider: Provider, No Known    Reason for Consultation: Status post recent gastric sleeve with epigastric pain when he tries to eat.    Chief complaint abdominal pain    History of present illness:  Von Rodríguez is a 56 y.o. male who is admitted for gastric sleeve which he had last Wednesday.  Postop he said trouble with epigastric pain when he tries to drink his protein shakes.  States he is doing some better today was able to sit up and without pain.  He is passing flatus.  He sitting up in a chair.  No fever or chills.  Does have some pain in his abdomen and moves.  Initially was scheduled as a laparoscopic procedure but had to be converted to open.  No prior history of stomach issues..  His gastric imaging scans were normal.  He underwent EGD on 10/5/2022 which showed erythema.  Biopsies were relatively benign.  He currently is on PPI as well as antibiotics and TPN.  Allergies:  Codeine    Scheduled Meds:  amLODIPine, 10 mg, Oral, Q24H  carvedilol, 12.5 mg, Oral, Q12H  dicyclomine, 20 mg, Intramuscular, 4x Daily  enoxaparin, 40 mg, Subcutaneous, Q12H  Fat Emul Fish Oil/Plant Based, 100 mL, Intravenous, Q24H (TPN)   And  [START ON 10/11/2022] Fat Emul Fish Oil/Plant Based, 100 mL, Intravenous, Q24H (TPN)  fluconazole, 400 mg, Intravenous, Daily  folic acid (FOLVITE) IVPB, 1 mg, Intravenous, Daily  insulin lispro, 0-9 Units, Subcutaneous, 4x Daily AC & at Bedtime  losartan, 100 mg, Oral, Q24H  pantoprazole, 40 mg, Intravenous, Q12H  piperacillin-tazobactam, 3.375 g, Intravenous, Q8H  rosuvastatin, 10 mg, Oral, Daily  simethicone, 80 mg, Oral, 4x Daily  sodium chloride, 10 mL, Intravenous, Q12H  thiamine (B-1) IV, 200 mg, Intravenous, TID         Infusions:  Adult Central 2-in-1 TPN,   Adult Cyclic 2-in-1 TPN, , Last Rate: 60 mL/hr at 10/10/22 1108  lactated ringers, 150 mL/hr, Last Rate: 150 mL/hr (10/05/22 1808)  Pharmacy to  Dose TPN,   sodium chloride 0.45 % with KCl 20 mEq, 125 mL/hr, Last Rate: 125 mL/hr (10/10/22 0848)        PRN Meds:  •  acetaminophen  •  Albuterol Sulfate NEB Orderable  •  ALPRAZolam  •  aluminum-magnesium hydroxide-simethicone  •  benzonatate  •  diphenhydrAMINE  •  guaiFENesin-dextromethorphan  •  hydrALAZINE  •  HYDROmorphone **AND** naloxone  •  HYDROmorphone  •  LORazepam  •  LORazepam  •  magnesium sulfate **OR** magnesium sulfate **OR** magnesium sulfate  •  Morphine **AND** naloxone  •  naloxone  •  [] ondansetron **FOLLOWED BY** ondansetron  •  ondansetron  •  oxyCODONE  •  Pharmacy to Dose TPN  •  phenol  •  potassium phosphate infusion greater than 15 mMoles **OR** potassium phosphate infusion greater than 15 mMoles **OR** potassium phosphate **OR** sodium phosphate IVPB **OR** sodium phosphate IVPB **OR** sodium phosphate IVPB  •  prochlorperazine  •  promethazine  •  sodium chloride  •  sodium chloride    Home Meds:  Medications Prior to Admission   Medication Sig Dispense Refill Last Dose   • amLODIPine (NORVASC) 10 MG tablet 10 mg.   10/4/2022 at 1830   • carvedilol (COREG) 12.5 MG tablet 12.5 mg.   10/5/2022 at 0410   • HumaLOG KwikPen 200 UNIT/ML solution pen-injector    Past Week at Unknown time   • Insulin Glargine (Lantus SoloStar) 100 UNIT/ML injection pen Every 12 (Twelve) Hours.   10/4/2022 at 1900   • losartan-hydrochlorothiazide (HYZAAR) 100-12.5 MG per tablet losartan 100 mg-hydrochlorothiazide 12.5 mg tablet   Take 1 tablet every day by oral route.   10/4/2022 at 1830   • metFORMIN (GLUCOPHAGE) 1000 MG tablet 1,000 mg 2 (Two) Times a Day.   10/4/2022 at 1830   • omeprazole OTC (PriLOSEC OTC) 20 MG EC tablet 20 mg.   10/4/2022 at 1830   • rosuvastatin (CRESTOR) 10 MG tablet 20 mg.   10/4/2022 at 1830   • Trulicity 1.5 MG/0.5ML solution pen-injector    Past Week at Unknown time   • VITAMIN D PO Take  by mouth.   10/4/2022 at Unknown time   • B-D UF III MINI PEN NEEDLES 31G X 5 MM  misc       • Continuous Blood Gluc Sensor (FreeStyle Matt 14 Day Sensor) misc       • Enoxaparin Sodium (LOVENOX) 40 MG/0.4ML solution prefilled syringe syringe Inject 0.4 mL under the skin into the appropriate area as directed Daily for 21 days. 8.4 mL 0 Unknown at Unknown time   • fluconazole (DIFLUCAN) 150 MG tablet Take 150 mg by mouth Daily.      • Paxlovid 20 x 150 MG & 10 x 100MG tablet therapy pack tablet TAKE 2 NIRMATRELVIR TABLETS AND 1 RITONAVIR TABLET TOGETHER BY MOUTH TWICE DAILY FOR 5 DAYS      • Semglee, yfgn, 100 UNIT/ML solution pen-injector           ROS: Review of Systems   Constitutional: Negative for chills, fever and unexpected weight change.   Gastrointestinal: Positive for abdominal pain. Negative for constipation, diarrhea, nausea and vomiting.   All other systems reviewed and are negative.      PAST MED HX: Pt  has a past medical history of Abnormal PFT, Asthma, Back pain, Benign paroxysmal positional vertigo, Candidal intertrigo, Cornea transplant recipient, Elevated alanine aminotransferase (ALT) level, Heartburn, Helicobacter pylori ab+, Hiatal hernia with gastroesophageal reflux, Hypertension, Incisional hernia, Microcytic erythrocytes, JAD (obstructive sleep apnea), Other hyperlipidemia, S/P cholecystectomy (1993), Type 2 diabetes mellitus without complication, with long-term current use of insulin (HCC), and Wears glasses.  PAST SURG HX: Pt  has a past surgical history that includes Laparoscopic cholecystectomy (1993); Diagnostic laparoscopy; Tonsillectomy and adenoidectomy (2007); Uvuloplasty; Colonoscopy; Corneal transplant (Left); Esophagogastroduodenoscopy; [place] tooth extraction; Gastric Sleeve (N/A, 10/5/2022); Esophagogastroduodenoscopy (N/A, 10/5/2022); and Exploratory Laparotomy (N/A, 10/5/2022).  FAM HX: family history includes Dementia in his mother; Diabetes in his brother; Heart disease in his father; Hypertension in his brother, father, and mother; No Known Problems in  "his brother; Stroke in his father.  SOC HX: Pt  reports that he has never smoked. He has never used smokeless tobacco. He reports that he does not currently use alcohol. He reports that he does not use drugs.    /89 (BP Location: Left arm, Patient Position: Sitting)   Pulse 87   Temp 99.3 °F (37.4 °C) (Oral)   Resp 18   Ht 174 cm (68.5\")   Wt (!) 172 kg (379 lb 3.1 oz)   SpO2 94%   BMI 56.81 kg/m²     Physical Exam  Wt Readings from Last 3 Encounters:   10/05/22 (!) 172 kg (379 lb 3.1 oz)   09/06/22 (!) 173 kg (381 lb)   05/26/22 (!) 171 kg (376 lb)   ,body mass index is 56.81 kg/m².    General pleasant white obese male sitting up in chair; no acute distress.   ENT Good dentition.  Oral mucosa pink & moist without thrush or lesions.    Neck Neck supple; trachea midline. No thyromegaly  Resp CTA; no rhonchi, rales, or wheezes.  Respiration effort normal  CV RRR; ; no M/R/G. No lower extremity edema  GI Abd soft, NT, ND, normal active bowel sounds.  Horizontal incision across upper abdomen with staples.  Laparoscopic port noted.  He has a drain in place.  Morbidly obese.    Skin No rash; no lesions; no bruises.  Skin turgor normal  MSK No clubbing; no cyanosis.    Psych Oriented to time, place, and person.  Appropriate affect      Results Review:   I reviewed the patient's new clinical results.  I reviewed the patient's new imaging results and agree with the interpretation.    Lab Results   Component Value Date    WBC 5.95 10/10/2022    HGB 10.7 (L) 10/10/2022    HCT 33.3 (L) 10/10/2022    MCV 79.1 10/10/2022     10/10/2022       Lab Results   Component Value Date    GLUCOSE 158 (H) 10/10/2022    BUN 9 10/10/2022    CREATININE 0.72 (L) 10/10/2022    BCR 12.5 10/10/2022    CO2 23.0 10/10/2022    CALCIUM 9.0 10/10/2022    PROTENTOTREF 7.7 05/26/2022    ALBUMIN 3.30 (L) 10/10/2022    LABIL2 1.4 05/26/2022    AST 36 10/10/2022    ALT 57 (H) 10/10/2022     Fluoroscopy barium swallow does not show " completely.  There may be a small amount of air.  But certainly nothing worrisome.  CT scan shows the same.    Final Diagnosis   STOMACH, SUBTOTAL GASTRECTOMY:  Benign hyperplastic polyp  Oxyntic-type gastric mucosa with minimal chronic inactive inflammation  Negative for intestinal metaplasia, dysplasia and malignancy  Stomach wall within normal limits   Electronically signed by Hiram Dudley DO on 10/6/2022 at 1619   Gross Description    1. Stomach.  Received in formalin labeled subtotal gastrectomy is a 20 x 6.5 x 4.8 cm intact, unopened portion of stomach with a staple line running the length of the specimen.  The serosa is tan-pink with minimal attached fat.  No significant surgical disruption is present.  The lumen contains a moderate amount of dark red viscous blood and the mucosa is red-pink and congested with normal, prominent rugal folds and minimal mucosal sloughing.  A single sessile polyp is present, 0.8 cm in greatest dimension.  No ulcers or areas of mucosal thickening are present.  Representative sections are submitted in blocks 1A-1C with the polyp in block 1A..   [facility]                  ASSESSMENTS/PLANS  1.  Status post gastric sleeve, postop day 5  2.  Question minimal leak that is well contained versus contrast American edematous mucosal folds    3.  Morbid obesity  4.  Diabetes type 2  5.  Sleep apnea  6.  Hypertension  7.  Hyperlipidemia  8.  Asthma        Currently continue PPI  Continue TPN.  I suspect this condition will continue to improve over the next couple days.  Will continue to follow.  At present time with minimal findings CT and fluoroscopy upper GI risk of endoscopy outweighs benefits.      I discussed the patient's findings and my recommendations with patient, family and consulting provider    Vincenzo Huerta MD  10/10/22  15:21 EDT

## 2022-10-10 NOTE — CASE MANAGEMENT/SOCIAL WORK
Continued Stay Note  [facility]     Patient Name: Von Rodríguez  MRN: [id number]  Today's Date: 10/10/2022    Admit Date: 10/5/2022    Plan: CM update   Discharge Plan     Row Name 10/10/22 2681       Plan    Plan CM update    Plan Comments Goal remains to return home upon discharge-currently is on PPI as well as IV antibiotics and TPN. He will return home with his s/o where they live in [facility]. No dc needs at this time but CM will continue to follow for any possible dc needs that may arise -[phone]    Final Discharge Disposition Code 01 - home or self-care               Discharge Codes    No documentation.                     Sammy Kearney RN

## 2022-10-10 NOTE — PROGRESS NOTES
[facility]  PROGRESS NOTE    Patient Name: Von Rodríguez  : 1965  MRN: [id number]    Date of Admission: 10/5/2022  Primary Care Physician: Provider, No Known    Subjective   Subjective   CC:  Med management post op sleeve     HPI:  Patient sitting up in a chair sleeping in NAD.  Awaken patient and his only complaints or severe pain when he ate or drink anything, a lot of gas (belching) and a cough.  Patient had no cough during exam and even when taking deep inspiration, but ordered him some cough medicine.  Patient is getting TPN through PICC line. Tmax 99.0    ROS:  Gen- No fevers, chills  CV- No chest pain, palpitations  Resp- No dyspnea; +cough  GI- No N/V/D; +appropriate surgical abd pain; +excessive belching; +severe pain when eating or drinking  All other systems have been reviewed and the pertinent positives and negatives are listed above in the HPI or ROS    Objective   Objective   Vital Signs:   Temp:  [98.3 °F (36.8 °C)-99 °F (37.2 °C)] 99 °F (37.2 °C)  Heart Rate:  [] 99  Resp:  [16-18] 16  BP: (117-151)/() 151/82  Physical Exam:  Constitutional: Sleeping but easily arousable, very morbidly obese male sitting up in bed in NAD  Eyes: EOMI, sclerae anicteric, no conjunctival injection  Head: NCAT  ENT: [place], moist mucous membranes   Respiratory: Nonlabored, symmetrical chest expansion, CTAB but diminished throughout most likely d/t body habitus, RA  Cardiovascular: RRR, HR 92, no M/R/G, +DP pulses bilaterally  Gastrointestinal: Very morbidly obese, soft, +surgical tenderness, ND +BS; RIP drain in place draining light pink drainage  Musculoskeletal: MARQUEZ; no LE edema bilaterally; +RUE PICC  Neurologic: Oriented x4, strength symmetric in all extremities, follows all commands, speech clear  Skin: No rashes on exposed skin; laparoscopic incisions stapled but healing well  Psychiatric: Pleasant and cooperative; normal affect    Results  Reviewed:  LAB RESULTS:      Lab 10/10/22  0619 10/09/22  1303 10/09/22  1156 10/09/22  0605 10/08/22  0550 10/07/22  0644 10/06/22  0725   WBC 5.95  --   --  6.52 6.93 6.96 8.31   HEMOGLOBIN 10.7*  --   --  11.1* 11.3* 11.7* 12.4*   HEMATOCRIT 33.3*  --   --  34.2* 36.8* 37.6 39.5   PLATELETS 257  --   --  248 213 204 218   NEUTROS ABS 3.26  --   --  3.70 3.88 4.54 6.45   IMMATURE GRANS (ABS) 0.04  --   --  0.04 0.02 0.02 0.02   LYMPHS ABS 1.63  --   --  1.79 2.19 1.62 0.97   MONOS ABS 0.58  --   --  0.64 0.71 0.76 0.87   EOS ABS 0.41*  --   --  0.32 0.10 0.01 0.00   MCV 79.1  --   --  79.2 81.8 81.7 80.8   CRP  --   --   --  13.13*  --   --   --    PROCALCITONIN 0.22  --  0.20  --   --   --   --    LACTATE 0.9 1.3  --   --   --   --   --          Lab 10/10/22  0619 10/09/22  0605 10/08/22  0550 10/07/22  0644 10/06/22  0725   SODIUM 139 137 136 138 139   POTASSIUM 4.0 4.4 4.5 4.4 4.9   CHLORIDE 103 103 103 104 103   CO2 23.0 21.0* 21.0* 23.0 25.0   ANION GAP 13.0 13.0 12.0 11.0 11.0   BUN 9 11 15 18 22*   CREATININE 0.72* 0.66* 0.95 0.79 1.02   EGFR 108.6 111.5 95.1 105.6 87.3   GLUCOSE 158* 121* 126* 134* 157*   CALCIUM 9.0 8.9 9.0 9.0 9.1   IONIZED CALCIUM 1.35*  --   --   --   --    MAGNESIUM 1.6 1.7  --   --   --    PHOSPHORUS 2.8 2.0*  --   --   --          Lab 10/10/22  0619 10/09/22  0605 10/08/22  0550 10/07/22  0644 10/06/22  0725   TOTAL PROTEIN 5.8* 6.3 6.6 6.1 6.5   ALBUMIN 3.30* 3.20* 3.30* 3.50 4.00   GLOBULIN 2.5 3.1 3.3 2.6 2.5   ALT (SGPT) 57* 58* 67* 88* 108*   AST (SGOT) 36 42* 45* 83* 92*   BILIRUBIN 0.6 0.6 0.5 0.4 0.3   ALK PHOS 57 58 54 53 48             Lab 10/09/22  0605   CHOLESTEROL 103   TRIGLYCERIDES 116         Lab 10/06/22  0725 10/05/22  1015   IRON 20*  --    ABO TYPING  --  AB   RH TYPING  --  Positive   ANTIBODY SCREEN  --  Negative         Brief Urine Lab Results     None          Microbiology Results Abnormal     None          CT Abdomen Pelvis With Contrast    Result Date:  10/8/2022  DATE OF EXAM: 10/8/2022 3:25 PM  PROCEDURE: CT ABDOMEN PELVIS W CONTRAST-  INDICATIONS: post op #3 open sleeve gastrectomy; K44.9-Diaphragmatic hernia without obstruction or gangrene; Z68.43-Body mass index (BMI) 50.0-59.9, adult  COMPARISON: 10/6/2022 upper GI series  TECHNIQUE: Routine transaxial slices were obtained through the abdomen and pelvis after the intravenous administration of 100 mL of Isovue 300. Reconstructed coronal and sagittal images were also obtained. Automated exposure control and iterative construction methods were used.  The radiation dose reduction device was turned on for each scan per the ALARA (As Low as Reasonably Achievable) protocol.  FINDINGS: Lower thorax:Lung bases are clear. No coronary artery calcifications seen in the visualized heart. No pericardial effusion.  No evidence of pulmonary embolus in the visualized pulmonary arteries.  Liver: No focal hepatic lesions seen.  Normal hepatic size. Hypodensity of the liver suggestive of steatosis.  Gallbladder and bile ducts: Cholecystectomy clips. No intra- or extra- hepatic biliary ductal dilatation.  Spleen: Normal appearance of the spleen.  Pancreas: Normal appearance of the pancreas. Main pancreatic duct is nondilated.  Adrenals: Normal appearance of the adrenal glands.  Kidneys: Bilateral renal cysts. Symmetric enhancement and excretion of contrast. No nephrolithiasis.  Normal caliber of the ureters.  Bowel: Postoperative changes of vertical sleeve gastrectomy. Normal caliber of the bowel. High density material to the colon is likely from recent fluoroscopic study. Appendix is not visualized but no secondary signs of appendicitis. Small hiatal hernia.  Pelvis: Normal appearance of the bladder. Prostatomegaly. Seminal vesicles appear normal.  Peritoneum: Stranding seen along the upper abdomen. Small amount of free fluid in the pelvis and in the perisplenic space. There is a small focal fluid collection adjacent to the  distal greater curvature of the stomach measuring approximately 4.6 x 3.6 x 2.2 cm. No rim enhancement. No free air. No peritoneal nodularity. A surgical drain is seen within the upper abdomen.  Vessels: Normal aortic caliber.  No atherosclerotic calcification of the aorta. Celiac artery, splenic artery, superior mesenteric artery, inferior mesenteric artery, renal arteries and iliac arteries appear patent. Iliac veins, inferior vena cava, superior mesenteric vein, renal veins, portal vein and splenic vein are patent.  Lymph nodes: No enlarged or suspicious adenopathy.  Bones: No acute osseous abnormality. Degenerative changes of the spine.  Soft tissues: Stranding and gas seen within the upper abdominal wall consistent with recent incision. Surgical skin staples seen. Diffuse body wall edema seen.      Impression: Postoperative changes of vertical sleeve gastrectomy with associated surrounding fat stranding and surgical drain.  Adjacent to the distal greater curvature there is a small focal fluid collection measuring up to 4.6 cm. No rim enhancement. This may represent a postoperative seroma though developing phlegmon/infection cannot be fully excluded  This report was finalized on 10/8/2022 10:09 PM by García Little.            I have reviewed the medications:  Scheduled Meds:amLODIPine, 10 mg, Oral, Q24H  carvedilol, 12.5 mg, Oral, Q12H  enoxaparin, 40 mg, Subcutaneous, Q12H  fat emulsion, 250 mL, Intravenous, Once per day on Mon Wed Fri  fluconazole, 400 mg, Intravenous, Daily  insulin lispro, 0-9 Units, Subcutaneous, 4x Daily AC & at Bedtime  losartan, 100 mg, Oral, Q24H  pantoprazole, 40 mg, Intravenous, Q12H  piperacillin-tazobactam, 3.375 g, Intravenous, Q8H  rosuvastatin, 10 mg, Oral, Daily  sodium chloride, 10 mL, Intravenous, Q12H  IV Fluids 1000 mL + additives, 100 mL/hr, Intravenous, Once  thiamine (B-1) IV, 100 mg, Intravenous, Daily      Continuous Infusions:Adult Cyclic 2-in-1 TPN, , Last Rate: 20  mL/hr at 10/09/22 1738  lactated ringers, 150 mL/hr, Last Rate: 150 mL/hr (10/05/22 1613)  Pharmacy to Dose TPN,   sodium chloride 0.45 % with KCl 20 mEq, 125 mL/hr, Last Rate: 125 mL/hr (10/09/22 1305)      PRN Meds:.•  acetaminophen  •  Albuterol Sulfate NEB Orderable  •  ALPRAZolam  •  benzonatate  •  diphenhydrAMINE  •  hydrALAZINE  •  HYDROmorphone **AND** naloxone  •  HYDROmorphone  •  LORazepam  •  LORazepam  •  Morphine **AND** naloxone  •  naloxone  •  [] ondansetron **FOLLOWED BY** ondansetron  •  ondansetron  •  oxyCODONE  •  Pharmacy to Dose TPN  •  phenol  •  potassium phosphate infusion greater than 15 mMoles **OR** potassium phosphate infusion greater than 15 mMoles **OR** potassium phosphate **OR** sodium phosphate IVPB **OR** sodium phosphate IVPB **OR** sodium phosphate IVPB  •  prochlorperazine  •  promethazine  •  simethicone  •  sodium chloride  •  sodium chloride    Assessment & Plan   Assessment & Plan     Active Hospital Problems    Diagnosis  POA   • **Morbid obesity with body mass index (BMI) of 50.0 to 59.9 in adult ([facility]) [E66.01, Z68.43]  Not Applicable   • Hypertension [I10]  Yes   • Other hyperlipidemia [E78.49]  Yes   • JAD (obstructive sleep apnea) [G47.33]  Yes   • Type 2 diabetes mellitus without complication, with long-term current use of insulin ([facility]) [E11.9, Z79.4]  Not Applicable   • GERD (gastroesophageal reflux disease) [K21.9]  Yes   • Benign paroxysmal positional vertigo [H81.10]  Yes   • Asthma [J45.909]  Yes      Resolved Hospital Problems    Diagnosis Date Resolved POA   • Hiatal hernia [K44.9] 10/05/2022 Unknown        Brief Hospital Course to date:  Von Rodríguez is a 56 y.o. male with a past medical history significant for hypertension, HLD, DM2, BPPV, GERD, asthma, JAD, and morbid obesity. Presents as a consult from Dr. Connelly. He is less than 24 hours s/p laparoscopic gastric sleeve and EGD. He is stable. Tolerated procedure well. Pain is controlled with PCA.  Nurse is reporting oxygenation does drop mildly while patient is sleeping. He has worn CPAP remotely but wishes to avoid it if possible. Reports he still feels groggy from anesthesia, but otherwise no complaints. No fever, cough, congestion, SOB, or chest pain. No headache or focal weakness/paratehesias. Blood pressure has been elevated slightly. Antihypertensives resumed by bariatric service. Will continue to follow for medical management    These problems are new to me today    This patient's problems and plans were partially entered by my partner and updated as appropriate by me 10/10/22.    Morbid Obesity S/P Lap Gastric Sleeve  Fever, CT imaging with post-op focal fluid collection 4.6 cm  - S/p Gastric Sleeve ( Connelly) oin 10/5/22; low grade fever 99.0 on 10/10 and CT imaging with small focal fluid collection  - defer to bariatric surgery, TPN/PICC started 10/9  - currently on IV abx   - close monitor on telemetry  - having bowel fxn   --Added Bentyl for abdominal cramping and scheduled simethicone for gas  --PRN Maalox for gas  --GI consult for painful swallowing liquids/food     Hypertension  HLD  - currently stable  - resume Norvasc 10 mg, Coreg 12.5 mg, and cozaar 100 mg  - at time of discharge would likely hold on HCTZ component of hyzaar. Remainder of meds unchanged. Will write new Rx for losartan given he normally takes in combination form      DM2 w/A1c 6.2  - 24H glucose 119-158  - ok to resume home regimen and keep unchanged at discharge  --Consider SSI if glucoses increase d/t TPN     Asthma  JAD  - continue IS  - duo nebs with additional pulmonary toilet as needed- on RA during visit this AM  - encourage CPAP    Thank you for allowing [facility] Medicine Service to provide consultative care for your patient, we will continue to follow while clinically appropriate.     Mandie Rae, APRN  10/10/22

## 2022-10-10 NOTE — PLAN OF CARE
Goal Outcome Evaluation:  Plan of Care Reviewed With: patient        Progress: no change  Outcome Evaluation: VSS, room air. TPN infusing. Patient has been sleeping for most of the day. Patient has not tolerated any PO intake other than taking pills. Patient is ambulating in the room with spouse, ambulation in the berg encouraged however patient did not ambulate in the berg. Insinitive spirometer use encouraged also. Patient is voiding and has had a bowel movement today. Magnesium replacement protocol in place. Plan of care discussed with the patient and spouse at bedside.

## 2022-10-10 NOTE — PAYOR COMM NOTE
"Von Rodríguez (56 y.o. Male)     [facility]eah [facility]   phone: [phone]  fax: [phone]    Updated clinical       Date of Birth   1965    Social Security Number       Address   [address] [place] [id number]    Home Phone   [phone]    MRN   [id number]       Sikh   Druze    Marital Status                               Admission Date   10/5/22    Admission Type   Elective    Admitting Provider   Pete Connelly MD    Attending Provider   Pete Connelly MD    Department, Room/Bed   [facility] 2F, S219/1       Discharge Date       Discharge Disposition       Discharge Destination                               Attending Provider: Pete Connelly MD    Allergies: Codeine    Isolation: None   Infection: None   Code Status: CPR    Ht: 174 cm (68.5\")   Wt: 172 kg (379 lb 3.1 oz)    Admission Cmt: None   Principal Problem: Morbid obesity with body mass index (BMI) of 50.0 to 59.9 in adult (HCC) [E66.01,Z68.43]                 Active Insurance as of 10/5/2022     Primary Coverage     Payor Plan Insurance Group Employer/Plan Group    [employer] PPO [id number]     Payor Plan Address Payor Plan Phone Number Payor Plan Fax Number Effective Dates    [address] [phone]  1/1/2021 - None Entered    [place] [address]       Subscriber Name Subscriber Birth Date Member ID       TOBIAS ARTEAGA 11/8/1968 [id number]                 Emergency Contacts      (Rel.) Home Phone Work Phone Mobile Phone    TOBIAS ARTEAGA (Significant Other) -- -- [phone]            Lab Results (last 72 hours)     Procedure Component Value Units Date/Time    Prealbumin [[id number]]  (Abnormal) Collected: 10/10/22 0619    Specimen: Blood Updated: 10/10/22 0934     Prealbumin 10.7 mg/dL     POC Glucose Once [[id number]]  (Abnormal) Collected: 10/10/22 0752    Specimen: Blood Updated: 10/10/22 0753     Glucose 159 mg/dL      " Comment: Meter: [id number] : [id number] Kori Almeida       Phosphorus [[id number]]  (Normal) Collected: 10/10/22 0619    Specimen: Blood Updated: 10/10/22 0731     Phosphorus 2.8 mg/dL     Comprehensive Metabolic Panel [[id number]]  (Abnormal) Collected: 10/10/22 0619    Specimen: Blood Updated: 10/10/22 0725     Glucose 158 mg/dL      BUN 9 mg/dL      Creatinine 0.72 mg/dL      Sodium 139 mmol/L      Potassium 4.0 mmol/L      Chloride 103 mmol/L      CO2 23.0 mmol/L      Calcium 9.0 mg/dL      Total Protein 5.8 g/dL      Albumin 3.30 g/dL      ALT (SGPT) 57 U/L      AST (SGOT) 36 U/L      Alkaline Phosphatase 57 U/L      Total Bilirubin 0.6 mg/dL      Globulin 2.5 gm/dL      Comment: Calculated Result        A/G Ratio 1.3 g/dL      BUN/Creatinine Ratio 12.5     Anion Gap 13.0 mmol/L      eGFR 108.6 mL/min/1.73      Comment: National Kidney Foundation and American Society of Nephrology (ASN) Task Force recommended calculation based on the Chronic Kidney Disease Epidemiology Collaboration (CKD-EPI) equation refit without adjustment for race.       Narrative:      GFR Normal >60  Chronic Kidney Disease <60  Kidney Failure <15      Magnesium [[id number]]  (Normal) Collected: 10/10/22 0619    Specimen: Blood Updated: 10/10/22 0725     Magnesium 1.6 mg/dL     Procalcitonin [[id number]]  (Normal) Collected: 10/10/22 0619    Specimen: Blood Updated: 10/10/22 0705     Procalcitonin 0.22 ng/mL     Narrative:      As a Marker for Sepsis (Non-Neonates):    1. <0.5 ng/mL represents a low risk of severe sepsis and/or septic shock.  2. >2 ng/mL represents a high risk of severe sepsis and/or septic shock.    As a Marker for Lower Respiratory Tract Infections that require antibiotic therapy:    PCT on Admission    Antibiotic Therapy       6-12 Hrs later    >0.5                Strongly Recommended  >0.25 - <0.5        Recommended   0.1 - 0.25          Discouraged              Remeasure/reassess PCT  <0.1                Strongly  "Discouraged     Remeasure/reassess PCT    As 28 day mortality risk marker: \"Change in Procalcitonin Result\" (>80% or <=80%) if Day 0 (or Day 1) and Day 4 values are available. Refer to http://www.[facility]-pct-calculator.com    Change in PCT <=80%  A decrease of PCT levels below or equal to 80% defines a positive change in PCT test result representing a higher risk for 28-day all-cause mortality of patients diagnosed with severe sepsis for septic shock.    Change in PCT >80%  A decrease of PCT levels of more than 80% defines a negative change in PCT result representing a lower risk for 28-day all-cause mortality of patients diagnosed with severe sepsis or septic shock.       CBC & Differential [[id number]]  (Abnormal) Collected: 10/10/22 0619    Specimen: Blood Updated: 10/10/22 0700    Narrative:      The following orders were created for panel order CBC & Differential.  Procedure                               Abnormality         Status                     ---------                               -----------         ------                     CBC Auto Differential[[id number]]        Abnormal            Final result                 Please view results for these tests on the individual orders.    CBC Auto Differential [[id number]]  (Abnormal) Collected: 10/10/22 0619    Specimen: Blood Updated: 10/10/22 0700     WBC 5.95 10*3/mm3      RBC 4.21 10*6/mm3      Hemoglobin 10.7 g/dL      Hematocrit 33.3 %      MCV 79.1 fL      MCH 25.4 pg      MCHC 32.1 g/dL      RDW 15.4 %      RDW-SD 44.3 fl      MPV 8.7 fL      Platelets 257 10*3/mm3      Neutrophil % 54.8 %      Lymphocyte % 27.4 %      Monocyte % 9.7 %      Eosinophil % 6.9 %      Basophil % 0.5 %      Immature Grans % 0.7 %      Neutrophils, Absolute 3.26 10*3/mm3      Lymphocytes, Absolute 1.63 10*3/mm3      Monocytes, Absolute 0.58 10*3/mm3      Eosinophils, Absolute 0.41 10*3/mm3      Basophils, Absolute 0.03 10*3/mm3      Immature Grans, Absolute 0.04 10*3/mm3      " nRBC 0.0 /100 WBC     Lactic Acid, Plasma [[id number]]  (Normal) Collected: 10/10/22 0619    Specimen: Blood Updated: 10/10/22 0659     Lactate 0.9 mmol/L      Comment: Falsely depressed results may occur on samples drawn from patients receiving N-Acetylcysteine (NAC) or Metamizole.       Calcium, Ionized [[id number]]  (Abnormal) Collected: 10/10/22 0619    Specimen: Blood Updated: 10/10/22 0657     Ionized Calcium 1.35 mmol/L     POC Glucose Once [[id number]]  (Normal) Collected: 10/09/22 1945    Specimen: Blood Updated: 10/09/22 1946     Glucose 119 mg/dL      Comment: Meter: [id number] : [id number] Martin Belkis       POC Glucose Once [[id number]]  (Normal) Collected: 10/09/22 1559    Specimen: Blood Updated: 10/09/22 1601     Glucose 119 mg/dL      Comment: Meter: [id number] : [id number] Angeles Lore       Lactic Acid, Plasma [[id number]]  (Normal) Collected: 10/09/22 1303    Specimen: Blood Updated: 10/09/22 1340     Lactate 1.3 mmol/L      Comment: Falsely depressed results may occur on samples drawn from patients receiving N-Acetylcysteine (NAC) or Metamizole.       Prealbumin [[id number]]  (Abnormal) Collected: 10/09/22 0605    Specimen: Blood Updated: 10/09/22 1252     Prealbumin 11.3 mg/dL     Procalcitonin [[id number]]  (Normal) Collected: 10/09/22 1156    Specimen: Blood Updated: 10/09/22 1231     Procalcitonin 0.20 ng/mL     Narrative:      As a Marker for Sepsis (Non-Neonates):    1. <0.5 ng/mL represents a low risk of severe sepsis and/or septic shock.  2. >2 ng/mL represents a high risk of severe sepsis and/or septic shock.    As a Marker for Lower Respiratory Tract Infections that require antibiotic therapy:    PCT on Admission    Antibiotic Therapy       6-12 Hrs later    >0.5                Strongly Recommended  >0.25 - <0.5        Recommended   0.1 - 0.25          Discouraged              Remeasure/reassess PCT  <0.1                Strongly Discouraged     Remeasure/reassess PCT    As 28 day  "mortality risk marker: \"Change in Procalcitonin Result\" (>80% or <=80%) if Day 0 (or Day 1) and Day 4 values are available. Refer to http://www.[facility]-pct-calculator.com    Change in PCT <=80%  A decrease of PCT levels below or equal to 80% defines a positive change in PCT test result representing a higher risk for 28-day all-cause mortality of patients diagnosed with severe sepsis for septic shock.    Change in PCT >80%  A decrease of PCT levels of more than 80% defines a negative change in PCT result representing a lower risk for 28-day all-cause mortality of patients diagnosed with severe sepsis or septic shock.       POC Glucose Once [[id number]]  (Abnormal) Collected: 10/09/22 1140    Specimen: Blood Updated: 10/09/22 1142     Glucose 137 mg/dL      Comment: Meter: [id number] : [id number] Karthik Torrez       Cholesterol, Total [[id number]]  (Normal) Collected: 10/09/22 0605    Specimen: Blood Updated: 10/09/22 1118     Total Cholesterol 103 mg/dL     Narrative:      Cholesterol Reference Ranges    (U.S. Department fo Health and Human Services ATP III Classifications)    Desirable        <200 mg/dl  Borderline High  200-239 mg/dl  High Risk        >240 mg/dl    C-reactive Protein [[id number]]  (Abnormal) Collected: 10/09/22 0605    Specimen: Blood Updated: 10/09/22 1118     C-Reactive Protein 13.13 mg/dL     Triglycerides [[id number]]  (Normal) Collected: 10/09/22 0605    Specimen: Blood Updated: 10/09/22 1118     Triglycerides 116 mg/dL      Comment: Falsely depressed results may occur on samples drawn from patients receiving N-Acetylcysteine (NAC) or Metamizole.       Narrative:      Triglyceride Reference Ranges:    Normal           less than 150 mg/dL  Borderline       150-199 mg/dL  High             200-499 mg/dL  Very High        greater than 499 mg/dL    Phosphorus [[id number]]  (Abnormal) Collected: 10/09/22 0605    Specimen: Blood Updated: 10/09/22 1118     Phosphorus 2.0 mg/dL     Magnesium [[id number]]  " (Normal) Collected: 10/09/22 0605    Specimen: Blood Updated: 10/09/22 1118     Magnesium 1.7 mg/dL     CBC & Differential [[id number]]  (Abnormal) Collected: 10/09/22 0605    Specimen: Blood Updated: 10/09/22 0749    Narrative:      The following orders were created for panel order CBC & Differential.  Procedure                               Abnormality         Status                     ---------                               -----------         ------                     CBC Auto Differential[[id number]]        Abnormal            Final result                 Please view results for these tests on the individual orders.    CBC Auto Differential [[id number]]  (Abnormal) Collected: 10/09/22 0605    Specimen: Blood Updated: 10/09/22 0749     WBC 6.52 10*3/mm3      RBC 4.32 10*6/mm3      Hemoglobin 11.1 g/dL      Hematocrit 34.2 %      MCV 79.2 fL      MCH 25.7 pg      MCHC 32.5 g/dL      RDW 15.5 %      RDW-SD 44.5 fl      MPV 9.5 fL      Platelets 248 10*3/mm3      Neutrophil % 56.7 %      Lymphocyte % 27.5 %      Monocyte % 9.8 %      Eosinophil % 4.9 %      Basophil % 0.5 %      Immature Grans % 0.6 %      Neutrophils, Absolute 3.70 10*3/mm3      Lymphocytes, Absolute 1.79 10*3/mm3      Monocytes, Absolute 0.64 10*3/mm3      Eosinophils, Absolute 0.32 10*3/mm3      Basophils, Absolute 0.03 10*3/mm3      Immature Grans, Absolute 0.04 10*3/mm3      nRBC 0.0 /100 WBC     POC Glucose Once [[id number]]  (Normal) Collected: 10/09/22 0734    Specimen: Blood Updated: 10/09/22 0743     Glucose 116 mg/dL      Comment: Meter: [id number] : [id number] Karthik Torrez       Comprehensive Metabolic Panel [[id number]]  (Abnormal) Collected: 10/09/22 0605    Specimen: Blood Updated: 10/09/22 0730     Glucose 121 mg/dL      BUN 11 mg/dL      Creatinine 0.66 mg/dL      Sodium 137 mmol/L      Potassium 4.4 mmol/L      Chloride 103 mmol/L      CO2 21.0 mmol/L      Calcium 8.9 mg/dL      Total Protein 6.3 g/dL      Albumin 3.20 g/dL       ALT (SGPT) 58 U/L      AST (SGOT) 42 U/L      Alkaline Phosphatase 58 U/L      Total Bilirubin 0.6 mg/dL      Globulin 3.1 gm/dL      Comment: Calculated Result        A/G Ratio 1.0 g/dL      BUN/Creatinine Ratio 16.7     Anion Gap 13.0 mmol/L      eGFR 111.5 mL/min/1.73      Comment: National Kidney Foundation and American Society of Nephrology (ASN) Task Force recommended calculation based on the Chronic Kidney Disease Epidemiology Collaboration (CKD-EPI) equation refit without adjustment for race.       Narrative:      GFR Normal >60  Chronic Kidney Disease <60  Kidney Failure <15      POC Glucose Once [[id number]]  (Normal) Collected: 10/08/22 2143    Specimen: Blood Updated: 10/08/22 2145     Glucose 122 mg/dL      Comment: Meter: [id number] : [id number] Pasquale Moreno       POC Glucose Once [[id number]]  (Normal) Collected: 10/08/22 1626    Specimen: Blood Updated: 10/08/22 1627     Glucose 115 mg/dL      Comment: Meter: [id number] : [id number] Rubio Hansensy       POC Glucose Once [[id number]]  (Abnormal) Collected: 10/08/22 1124    Specimen: Blood Updated: 10/08/22 1133     Glucose 137 mg/dL      Comment: Meter: [id number] : [id number] Steve Dinero       Comprehensive Metabolic Panel [[id number]]  (Abnormal) Collected: 10/08/22 0550    Specimen: Blood Updated: 10/08/22 0819     Glucose 126 mg/dL      BUN 15 mg/dL      Creatinine 0.95 mg/dL      Sodium 136 mmol/L      Potassium 4.5 mmol/L      Comment: Slight hemolysis detected by analyzer. Results may be affected.        Chloride 103 mmol/L      CO2 21.0 mmol/L      Calcium 9.0 mg/dL      Total Protein 6.6 g/dL      Albumin 3.30 g/dL      ALT (SGPT) 67 U/L      AST (SGOT) 45 U/L      Alkaline Phosphatase 54 U/L      Total Bilirubin 0.5 mg/dL      Globulin 3.3 gm/dL      Comment: Calculated Result        A/G Ratio 1.0 g/dL      BUN/Creatinine Ratio 15.8     Anion Gap 12.0 mmol/L      eGFR 95.1 mL/min/1.73      Comment: National Kidney Foundation and  American Society of Nephrology (ASN) Task Force recommended calculation based on the Chronic Kidney Disease Epidemiology Collaboration (CKD-EPI) equation refit without adjustment for race.       Narrative:      GFR Normal >60  Chronic Kidney Disease <60  Kidney Failure <15      CBC & Differential [[id number]]  (Abnormal) Collected: 10/08/22 0550    Specimen: Blood Updated: 10/08/22 0715    Narrative:      The following orders were created for panel order CBC & Differential.  Procedure                               Abnormality         Status                     ---------                               -----------         ------                     CBC Auto Differential[[id number]]        Abnormal            Final result                 Please view results for these tests on the individual orders.    CBC Auto Differential [[id number]]  (Abnormal) Collected: 10/08/22 0550    Specimen: Blood Updated: 10/08/22 0715     WBC 6.93 10*3/mm3      RBC 4.50 10*6/mm3      Hemoglobin 11.3 g/dL      Hematocrit 36.8 %      MCV 81.8 fL      MCH 25.1 pg      MCHC 30.7 g/dL      RDW 15.4 %      RDW-SD 46.1 fl      MPV 9.4 fL      Platelets 213 10*3/mm3      Neutrophil % 56.1 %      Lymphocyte % 31.6 %      Monocyte % 10.2 %      Eosinophil % 1.4 %      Basophil % 0.4 %      Immature Grans % 0.3 %      Neutrophils, Absolute 3.88 10*3/mm3      Lymphocytes, Absolute 2.19 10*3/mm3      Monocytes, Absolute 0.71 10*3/mm3      Eosinophils, Absolute 0.10 10*3/mm3      Basophils, Absolute 0.03 10*3/mm3      Immature Grans, Absolute 0.02 10*3/mm3      nRBC 0.0 /100 WBC     POC Glucose Once [[id number]]  (Normal) Collected: 10/08/22 0700    Specimen: Blood Updated: 10/08/22 0703     Glucose 120 mg/dL      Comment: Meter: [id number] : [id number] Steve Dinero       POC Glucose Once [[id number]]  (Abnormal) Collected: 10/07/22 2002    Specimen: Blood Updated: 10/07/22 2003     Glucose 138 mg/dL      Comment: Meter: [id number] : [id number]  Esther Ramos       POC Glucose Once [[id number]]  (Abnormal) Collected: 10/07/22 1635    Specimen: Blood Updated: 10/07/22 1637     Glucose 132 mg/dL      Comment: Meter: [id number] : [id number] Steve Dinero       POC Glucose Once [[id number]]  (Normal) Collected: 10/07/22 1119    Specimen: Blood Updated: 10/07/22 1120     Glucose 128 mg/dL      Comment: Meter: [id number] : [id number] Guillermina Kirstin             Imaging Results (Last 72 Hours)     Procedure Component Value Units Date/Time    CT Abdomen Without Contrast [[id number]] Collected: 10/10/22 1014     Updated: 10/10/22 1030    Narrative:      DATE OF EXAM: 10/10/2022 9:53 AM     PROCEDURE: CT ABDOMEN WO CONTRAST-     INDICATIONS: Abdominal pain, post-op     COMPARISON: Water-soluble upper GI series of same date. Abdomen and  pelvis CT scan of 10/08/2022     TECHNIQUE: Routine transaxial slices were obtained through the abdomen  without the administration of intravenous contrast. Reconstructed  coronal and sagittal images were also obtained. Automated exposure  control and iterative construction methods were used.     The radiation dose reduction device was turned on for each scan per the  ALARA (As Low as Reasonably Achievable) protocol.     FINDINGS:  History indicates postop gastric sleeve with abdominal pain. Pooling of  contrast at the GE junction, equivocal for small contained leak.     The area seen on upper GI series appears to be present along the left  lateral margin of the GE junction on image numbers 22 through 25,  coronal images 51 through 53. This small area of linear contrast with a  single air bubble appears to merge into normal-appearing contrast and a  prominent mucosal fold. The GE junction lumen is seen more medially, in  the upper edge of the left lateral contrast and air collection may  represent a tiny, well contained leak, particularly as it appears to be  quite marginal to the serosa, but may represent contrast trapped  between  prominent mucosal folds instead. No definite evidence of leak is  identified here or elsewhere. There is only slight haziness of the  surrounding fat at this level and this is improved from the prior CT  scan.     Contrast passes normally through the remainder of the sleeve and into  normal caliber, normal-appearing small bowel.     Elsewhere, there is minimal dependent atelectasis of the posterior lower  lobes. There is diffuse fatty liver change. No hepatic lesions are  identified. Spleen is normal in size and appearance. Pancreas appears  normal. Adrenal glands appear normal. There are large bilateral upper  pole renal cysts. No upper abdominal free air or ascites is seen.  Diffuse intra-abdominal fat stranding and subcutaneous fat stranding is  typical for recent surgery. No inflammatory collection is seen here. RIP  drain remains in place, with no evidence of any surrounding collection.             Impression:         1. Subtle contrast collection at the GE junction, with a single air  bubble which extends up to the left lateral serosal margin, as  described. If this represents a minimal leak it is well contained, and  this may simply represent contrast and air within edematous mucosal  folds.  2. Generally expected postoperative changes of recent surgery elsewhere,  with intraabdominal and subcutaneous fat stranding. No evidence of  inflammatory collection, hematoma, free air or other acute disease.       FL Upper GI Water Soluble [[id number]] Resulted: 10/10/22 0916     Updated: 10/10/22 0944    CT Abdomen Pelvis With Contrast [[id number]] Collected: 10/08/22 2158     Updated: 10/08/22 2212    Narrative:      DATE OF EXAM: 10/8/2022 3:25 PM     PROCEDURE: CT ABDOMEN PELVIS W CONTRAST-     INDICATIONS: post op #3 open sleeve gastrectomy; K44.9-Diaphragmatic  hernia without obstruction or gangrene; Z68.43-Body mass index (BMI)  50.0-59.9, adult     COMPARISON: 10/6/2022 upper GI series     TECHNIQUE:  Routine transaxial slices were obtained through the abdomen  and pelvis after the intravenous administration of 100 mL of Isovue 300.  Reconstructed coronal and sagittal images were also obtained. Automated  exposure control and iterative construction methods were used.     The radiation dose reduction device was turned on for each scan per the  ALARA (As Low as Reasonably Achievable) protocol.     FINDINGS:  Lower thorax:Lung bases are clear. No coronary artery calcifications  seen in the visualized heart. No pericardial effusion.  No evidence of  pulmonary embolus in the visualized pulmonary arteries.      Liver: No focal hepatic lesions seen.  Normal hepatic size. Hypodensity  of the liver suggestive of steatosis.     Gallbladder and bile ducts: Cholecystectomy clips. No intra- or extra-  hepatic biliary ductal dilatation.     Spleen: Normal appearance of the spleen.     Pancreas: Normal appearance of the pancreas. Main pancreatic duct is  nondilated.     Adrenals: Normal appearance of the adrenal glands.     Kidneys: Bilateral renal cysts. Symmetric enhancement and excretion of  contrast. No nephrolithiasis.  Normal caliber of the ureters.      Bowel: Postoperative changes of vertical sleeve gastrectomy. Normal  caliber of the bowel. High density material to the colon is likely from  recent fluoroscopic study. Appendix is not visualized but no secondary  signs of appendicitis. Small hiatal hernia.     Pelvis: Normal appearance of the bladder. Prostatomegaly. Seminal  vesicles appear normal.     Peritoneum: Stranding seen along the upper abdomen. Small amount of free  fluid in the pelvis and in the perisplenic space. There is a small focal  fluid collection adjacent to the distal greater curvature of the stomach  measuring approximately 4.6 x 3.6 x 2.2 cm. No rim enhancement. No free  air. No peritoneal nodularity. A surgical drain is seen within the upper  abdomen.     Vessels: Normal aortic caliber.  No  atherosclerotic calcification of the  aorta. Celiac artery, splenic artery, superior mesenteric artery,  inferior mesenteric artery, renal arteries and iliac arteries appear  patent. Iliac veins, inferior vena cava, superior mesenteric vein, renal  veins, portal vein and splenic vein are patent.      Lymph nodes: No enlarged or suspicious adenopathy.     Bones: No acute osseous abnormality. Degenerative changes of the spine.     Soft tissues: Stranding and gas seen within the upper abdominal wall  consistent with recent incision. Surgical skin staples seen. Diffuse  body wall edema seen.       Impression:      Postoperative changes of vertical sleeve gastrectomy with associated  surrounding fat stranding and surgical drain.     Adjacent to the distal greater curvature there is a small focal fluid  collection measuring up to 4.6 cm. No rim enhancement. This may  represent a postoperative seroma though developing phlegmon/infection  cannot be fully excluded     This report was finalized on 10/8/2022 10:09 PM by García Little.              Physician Progress Notes (last 72 hours)      Mandie Rae APRN at 10/10/22 0756              [facility] Medicine Services  PROGRESS NOTE    Patient Name: Von Rodríguez  : 1965  MRN: [id number]    Date of Admission: 10/5/2022  Primary Care Physician: Provider, No Known    Subjective   Subjective   CC:  Med management post op sleeve     HPI:  Patient sitting up in a chair sleeping in NAD.  Awaken patient and his only complaints or severe pain when he ate or drink anything, a lot of gas (belching) and a cough.  Patient had no cough during exam and even when taking deep inspiration, but ordered him some cough medicine.  Patient is getting TPN through PICC line. Tmax 99.0    ROS:  Gen- No fevers, chills  CV- No chest pain, palpitations  Resp- No dyspnea; +cough  GI- No N/V/D; +appropriate surgical abd pain; +excessive belching; +severe pain when  eating or drinking  All other systems have been reviewed and the pertinent positives and negatives are listed above in the HPI or ROS    Objective   Objective   Vital Signs:   Temp:  [98.3 °F (36.8 °C)-99 °F (37.2 °C)] 99 °F (37.2 °C)  Heart Rate:  [] 99  Resp:  [16-18] 16  BP: (117-151)/() 151/82  Physical Exam:  Constitutional: Sleeping but easily arousable, very morbidly obese male sitting up in bed in NAD  Eyes: EOMI, sclerae anicteric, no conjunctival injection  Head: NCAT  ENT: [place], moist mucous membranes   Respiratory: Nonlabored, symmetrical chest expansion, CTAB but diminished throughout most likely d/t body habitus, RA  Cardiovascular: RRR, HR 92, no M/R/G, +DP pulses bilaterally  Gastrointestinal: Very morbidly obese, soft, +surgical tenderness, ND +BS; RIP drain in place draining light pink drainage  Musculoskeletal: MARQUEZ; no LE edema bilaterally; +RUE PICC  Neurologic: Oriented x4, strength symmetric in all extremities, follows all commands, speech clear  Skin: No rashes on exposed skin; laparoscopic incisions stapled but healing well  Psychiatric: Pleasant and cooperative; normal affect    Results Reviewed:  LAB RESULTS:      Lab 10/10/22  0619 10/09/22  1303 10/09/22  1156 10/09/22  0605 10/08/22  0550 10/07/22  0644 10/06/22  0725   WBC 5.95  --   --  6.52 6.93 6.96 8.31   HEMOGLOBIN 10.7*  --   --  11.1* 11.3* 11.7* 12.4*   HEMATOCRIT 33.3*  --   --  34.2* 36.8* 37.6 39.5   PLATELETS 257  --   --  248 213 204 218   NEUTROS ABS 3.26  --   --  3.70 3.88 4.54 6.45   IMMATURE GRANS (ABS) 0.04  --   --  0.04 0.02 0.02 0.02   LYMPHS ABS 1.63  --   --  1.79 2.19 1.62 0.97   MONOS ABS 0.58  --   --  0.64 0.71 0.76 0.87   EOS ABS 0.41*  --   --  0.32 0.10 0.01 0.00   MCV 79.1  --   --  79.2 81.8 81.7 80.8   CRP  --   --   --  13.13*  --   --   --    PROCALCITONIN 0.22  --  0.20  --   --   --   --    LACTATE 0.9 1.3  --   --   --   --   --          Lab 10/10/22  0619 10/09/22  0605 10/08/22  0550  10/07/22  0644 10/06/22  0725   SODIUM 139 137 136 138 139   POTASSIUM 4.0 4.4 4.5 4.4 4.9   CHLORIDE 103 103 103 104 103   CO2 23.0 21.0* 21.0* 23.0 25.0   ANION GAP 13.0 13.0 12.0 11.0 11.0   BUN 9 11 15 18 22*   CREATININE 0.72* 0.66* 0.95 0.79 1.02   EGFR 108.6 111.5 95.1 105.6 87.3   GLUCOSE 158* 121* 126* 134* 157*   CALCIUM 9.0 8.9 9.0 9.0 9.1   IONIZED CALCIUM 1.35*  --   --   --   --    MAGNESIUM 1.6 1.7  --   --   --    PHOSPHORUS 2.8 2.0*  --   --   --          Lab 10/10/22  0619 10/09/22  0605 10/08/22  0550 10/07/22  0644 10/06/22  0725   TOTAL PROTEIN 5.8* 6.3 6.6 6.1 6.5   ALBUMIN 3.30* 3.20* 3.30* 3.50 4.00   GLOBULIN 2.5 3.1 3.3 2.6 2.5   ALT (SGPT) 57* 58* 67* 88* 108*   AST (SGOT) 36 42* 45* 83* 92*   BILIRUBIN 0.6 0.6 0.5 0.4 0.3   ALK PHOS 57 58 54 53 48             Lab 10/09/22  0605   CHOLESTEROL 103   TRIGLYCERIDES 116         Lab 10/06/22  0725 10/05/22  1015   IRON 20*  --    ABO TYPING  --  AB   RH TYPING  --  Positive   ANTIBODY SCREEN  --  Negative         Brief Urine Lab Results     None          Microbiology Results Abnormal     None          CT Abdomen Pelvis With Contrast    Result Date: 10/8/2022  DATE OF EXAM: 10/8/2022 3:25 PM  PROCEDURE: CT ABDOMEN PELVIS W CONTRAST-  INDICATIONS: post op #3 open sleeve gastrectomy; K44.9-Diaphragmatic hernia without obstruction or gangrene; Z68.43-Body mass index (BMI) 50.0-59.9, adult  COMPARISON: 10/6/2022 upper GI series  TECHNIQUE: Routine transaxial slices were obtained through the abdomen and pelvis after the intravenous administration of 100 mL of Isovue 300. Reconstructed coronal and sagittal images were also obtained. Automated exposure control and iterative construction methods were used.  The radiation dose reduction device was turned on for each scan per the ALARA (As Low as Reasonably Achievable) protocol.  FINDINGS: Lower thorax:Lung bases are clear. No coronary artery calcifications seen in the visualized heart. No pericardial  effusion.  No evidence of pulmonary embolus in the visualized pulmonary arteries.  Liver: No focal hepatic lesions seen.  Normal hepatic size. Hypodensity of the liver suggestive of steatosis.  Gallbladder and bile ducts: Cholecystectomy clips. No intra- or extra- hepatic biliary ductal dilatation.  Spleen: Normal appearance of the spleen.  Pancreas: Normal appearance of the pancreas. Main pancreatic duct is nondilated.  Adrenals: Normal appearance of the adrenal glands.  Kidneys: Bilateral renal cysts. Symmetric enhancement and excretion of contrast. No nephrolithiasis.  Normal caliber of the ureters.  Bowel: Postoperative changes of vertical sleeve gastrectomy. Normal caliber of the bowel. High density material to the colon is likely from recent fluoroscopic study. Appendix is not visualized but no secondary signs of appendicitis. Small hiatal hernia.  Pelvis: Normal appearance of the bladder. Prostatomegaly. Seminal vesicles appear normal.  Peritoneum: Stranding seen along the upper abdomen. Small amount of free fluid in the pelvis and in the perisplenic space. There is a small focal fluid collection adjacent to the distal greater curvature of the stomach measuring approximately 4.6 x 3.6 x 2.2 cm. No rim enhancement. No free air. No peritoneal nodularity. A surgical drain is seen within the upper abdomen.  Vessels: Normal aortic caliber.  No atherosclerotic calcification of the aorta. Celiac artery, splenic artery, superior mesenteric artery, inferior mesenteric artery, renal arteries and iliac arteries appear patent. Iliac veins, inferior vena cava, superior mesenteric vein, renal veins, portal vein and splenic vein are patent.  Lymph nodes: No enlarged or suspicious adenopathy.  Bones: No acute osseous abnormality. Degenerative changes of the spine.  Soft tissues: Stranding and gas seen within the upper abdominal wall consistent with recent incision. Surgical skin staples seen. Diffuse body wall edema seen.       Impression: Postoperative changes of vertical sleeve gastrectomy with associated surrounding fat stranding and surgical drain.  Adjacent to the distal greater curvature there is a small focal fluid collection measuring up to 4.6 cm. No rim enhancement. This may represent a postoperative seroma though developing phlegmon/infection cannot be fully excluded  This report was finalized on 10/8/2022 10:09 PM by García Little.            I have reviewed the medications:  Scheduled Meds:amLODIPine, 10 mg, Oral, Q24H  carvedilol, 12.5 mg, Oral, Q12H  enoxaparin, 40 mg, Subcutaneous, Q12H  fat emulsion, 250 mL, Intravenous, Once per day on   fluconazole, 400 mg, Intravenous, Daily  insulin lispro, 0-9 Units, Subcutaneous, 4x Daily AC & at Bedtime  losartan, 100 mg, Oral, Q24H  pantoprazole, 40 mg, Intravenous, Q12H  piperacillin-tazobactam, 3.375 g, Intravenous, Q8H  rosuvastatin, 10 mg, Oral, Daily  sodium chloride, 10 mL, Intravenous, Q12H  IV Fluids 1000 mL + additives, 100 mL/hr, Intravenous, Once  thiamine (B-1) IV, 100 mg, Intravenous, Daily      Continuous Infusions:Adult Cyclic 2-in-1 TPN, , Last Rate: 20 mL/hr at 10/09/22 1738  lactated ringers, 150 mL/hr, Last Rate: 150 mL/hr (10/05/22 1613)  Pharmacy to Dose TPN,   sodium chloride 0.45 % with KCl 20 mEq, 125 mL/hr, Last Rate: 125 mL/hr (10/09/22 1305)      PRN Meds:.•  acetaminophen  •  Albuterol Sulfate NEB Orderable  •  ALPRAZolam  •  benzonatate  •  diphenhydrAMINE  •  hydrALAZINE  •  HYDROmorphone **AND** naloxone  •  HYDROmorphone  •  LORazepam  •  LORazepam  •  Morphine **AND** naloxone  •  naloxone  •  [] ondansetron **FOLLOWED BY** ondansetron  •  ondansetron  •  oxyCODONE  •  Pharmacy to Dose TPN  •  phenol  •  potassium phosphate infusion greater than 15 mMoles **OR** potassium phosphate infusion greater than 15 mMoles **OR** potassium phosphate **OR** sodium phosphate IVPB **OR** sodium phosphate IVPB **OR** sodium phosphate  IVPB  •  prochlorperazine  •  promethazine  •  simethicone  •  sodium chloride  •  sodium chloride    Assessment & Plan   Assessment & Plan     Active Hospital Problems    Diagnosis  POA   • **Morbid obesity with body mass index (BMI) of 50.0 to 59.9 in adult ([facility]) [E66.01, Z68.43]  Not Applicable   • Hypertension [I10]  Yes   • Other hyperlipidemia [E78.49]  Yes   • JAD (obstructive sleep apnea) [G47.33]  Yes   • Type 2 diabetes mellitus without complication, with long-term current use of insulin ([facility]) [E11.9, Z79.4]  Not Applicable   • GERD (gastroesophageal reflux disease) [K21.9]  Yes   • Benign paroxysmal positional vertigo [H81.10]  Yes   • Asthma [J45.909]  Yes      Resolved Hospital Problems    Diagnosis Date Resolved POA   • Hiatal hernia [K44.9] 10/05/2022 Unknown        Brief Hospital Course to date:  Von Rodríguez is a 56 y.o.  male with a past medical history significant for hypertension, HLD, DM2, BPPV, GERD, asthma, JAD, and morbid obesity. Presents as a consult from Dr. Connelly. He is less than 24 hours s/p laparoscopic gastric sleeve and EGD. He is stable. Tolerated procedure well. Pain is controlled with PCA. Nurse is reporting oxygenation does drop mildly while patient is sleeping. He has worn CPAP remotely but wishes to avoid it if possible. Reports he still feels groggy from anesthesia, but otherwise no complaints. No fever, cough, congestion, SOB, or chest pain. No headache or focal weakness/paratehesias. Blood pressure has been elevated slightly. Antihypertensives resumed by bariatric service. Will continue to follow for medical management    These problems are new to me today    This patient's problems and plans were partially entered by my partner and updated as appropriate by me 10/10/22.    Morbid Obesity S/P Lap Gastric Sleeve  Fever, CT imaging with post-op focal fluid collection 4.6 cm  - S/p Gastric Sleeve ( Connelly) oin 10/5/22; low grade fever 99.0 on 10/10 and CT imaging with small  "focal fluid collection  - defer to bariatric surgery, TPN/PICC started 10/9  - currently on IV abx   - close monitor on telemetry  - having bowel fxn   --Added Bentyl for abdominal cramping and scheduled simethicone for gas  --PRN Maalox for gas  --GI consult for painful swallowing liquids/food     Hypertension  HLD  - currently stable  - resume Norvasc 10 mg, Coreg 12.5 mg, and cozaar 100 mg  - at time of discharge would likely hold on HCTZ component of hyzaar. Remainder of meds unchanged. Will write new Rx for losartan given he normally takes in combination form      DM2 w/A1c 6.2  - 24H glucose 119-158  - ok to resume home regimen and keep unchanged at discharge  --Consider SSI if glucoses increase d/t TPN     Asthma  JAD  - continue IS  - duo nebs with additional pulmonary toilet as needed- on RA during visit this AM  - encourage CPAP    Thank you for allowing [facility] to provide consultative care for your patient, we will continue to follow while clinically appropriate.     DAMIEN Vargas  10/10/22                Electronically signed by Mandie Rae APRN at 10/10/22 1024     Pete Connelly MD at 10/09/22 1348        Cc: POD#4 laparoscopic converted to open vertical sleeve gastrectomy  \"feel ok\"    His girlfriend is in the room.  He is in bed and says he feels a little better.  Less pain with drinking.  He says he has no appetite and not taking in much.  Passing flatus which made him feel better as well.  He says he is ambulating and voiding well.  No pulmonary complaints.  He is concerned that I ordered procalcitonin and lactate, PICC line etc but appreciates that I wanted to get things going before I could sit down and talk to him so as not to delay on a Sunday.    T-max 99.1 currently 98.9 pulse 96 respirations 18 blood pressure 117/79 saturation 96% UO NR   SS he looks nontoxic/no apparent distress and conversant.  Abdomen is soft and appropriate, nontender, bowel " sounds present.  Wounds all healing without evidence of cellulitis/infection or drainage.    Lactate and procalcitonin normal at 1.3 and 0.2 respectively.  CMP normal except for glucose of 121 creatinine of 0.66 CO2 21 albumin 3.2 ALT 58 AST 42.  Prealbumin is low at 11.3.  White blood count normal at 6.52 with a normal differential.  H&H 11.1 and 34.2, microcytic indices.  Cholesterol and triglycerides normal.  C-reactive protein elevated at 13.13.  Magnesium normal at 1.7 phosphorus low at 2.0.    CT scan images and report reviewed.  Adjacent to the distal greater curvature a small focal fluid collection measuring 4.6 cm without rim enhancement which may represent postoperative seroma or developing phlegmon/infection cannot be excluded.  RIP drain appears to be in good position on my review.    Impression: Postop day 4 laparoscopic converted to open sleeve gastrectomy.  His postprandial pain has improved.  CT abnormal as above and at risk for developing an early leak, especially with low prealbumin and diabetes.  Elevated CRP.  Right now normal white count, procalcitonin, and lactate level.  Slight improvement in temperature curve.  Slight improvement in transaminases.    Plan: Discussed my concerns with the patient and his girlfriend.  He is agreeable with the plan to treat as best as possible for leak prevention/early unrecognized leak.  Change oral Protonix to IV 40 mg every 12 hours.  Daily IV thiamine.  Empiric intravenous Zosyn and Diflucan.  PICC line, TPN and lipids.  Recheck in upper GI in the morning.  Continue liquids as tolerated.  Continue RIP drain.  Continue close observation in telemetry.  See orders.    Electronically signed by Pete Connelly MD at 10/09/22 1400     Junie Reece MD at 10/09/22 1243              [facility] Medicine Services  PROGRESS NOTE    Patient Name: Von Rodríguez  : 1965  MRN: [id number]    Date of Admission: 10/5/2022  Primary Care  Physician: Provider, No Known    Subjective   Subjective     CC:  Med management post op sleeve     HPI:  Getting PICC during visit. D/w nursing. VSS, Tmax 99.1    ROS:  uto        Objective   Objective     Vital Signs:   Temp:  [98.1 °F (36.7 °C)-99.1 °F (37.3 °C)] 98.9 °F (37.2 °C)  Heart Rate:  [] 96  Resp:  [18] 18  BP: (117-152)/(79-98) 117/79     Physical Exam:  Getting PICC line during visit  No exam     Results Reviewed:  LAB RESULTS:      Lab 10/09/22  1156 10/09/22  0605 10/08/22  0550 10/07/22  0644 10/06/22  0725 10/05/22  1913   WBC  --  6.52 6.93 6.96 8.31  --    HEMOGLOBIN  --  11.1* 11.3* 11.7* 12.4* 13.5   HEMATOCRIT  --  34.2* 36.8* 37.6 39.5 43.6   PLATELETS  --  248 213 204 218  --    NEUTROS ABS  --  3.70 3.88 4.54 6.45  --    IMMATURE GRANS (ABS)  --  0.04 0.02 0.02 0.02  --    LYMPHS ABS  --  1.79 2.19 1.62 0.97  --    MONOS ABS  --  0.64 0.71 0.76 0.87  --    EOS ABS  --  0.32 0.10 0.01 0.00  --    MCV  --  79.2 81.8 81.7 80.8  --    CRP  --  13.13*  --   --   --   --    PROCALCITONIN 0.20  --   --   --   --   --          Lab 10/09/22  0605 10/08/22  0550 10/07/22  0644 10/06/22  0725   SODIUM 137 136 138 139   POTASSIUM 4.4 4.5 4.4 4.9   CHLORIDE 103 103 104 103   CO2 21.0* 21.0* 23.0 25.0   ANION GAP 13.0 12.0 11.0 11.0   BUN 11 15 18 22*   CREATININE 0.66* 0.95 0.79 1.02   EGFR 111.5 95.1 105.6 87.3   GLUCOSE 121* 126* 134* 157*   CALCIUM 8.9 9.0 9.0 9.1   MAGNESIUM 1.7  --   --   --    PHOSPHORUS 2.0*  --   --   --          Lab 10/09/22  0605 10/08/22  0550 10/07/22  0644 10/06/22  0725   TOTAL PROTEIN 6.3 6.6 6.1 6.5   ALBUMIN 3.20* 3.30* 3.50 4.00   GLOBULIN 3.1 3.3 2.6 2.5   ALT (SGPT) 58* 67* 88* 108*   AST (SGOT) 42* 45* 83* 92*   BILIRUBIN 0.6 0.5 0.4 0.3   ALK PHOS 58 54 53 48             Lab 10/09/22  0605   CHOLESTEROL 103   TRIGLYCERIDES 116         Lab 10/06/22  0725 10/05/22  1015   IRON 20*  --    ABO TYPING  --  AB   RH TYPING  --  Positive   ANTIBODY SCREEN  --   Negative         Brief Urine Lab Results     None          Microbiology Results Abnormal     None          CT Abdomen Pelvis With Contrast    Result Date: 10/8/2022  DATE OF EXAM: 10/8/2022 3:25 PM  PROCEDURE: CT ABDOMEN PELVIS W CONTRAST-  INDICATIONS: post op #3 open sleeve gastrectomy; K44.9-Diaphragmatic hernia without obstruction or gangrene; Z68.43-Body mass index (BMI) 50.0-59.9, adult  COMPARISON: 10/6/2022 upper GI series  TECHNIQUE: Routine transaxial slices were obtained through the abdomen and pelvis after the intravenous administration of 100 mL of Isovue 300. Reconstructed coronal and sagittal images were also obtained. Automated exposure control and iterative construction methods were used.  The radiation dose reduction device was turned on for each scan per the ALARA (As Low as Reasonably Achievable) protocol.  FINDINGS: Lower thorax:Lung bases are clear. No coronary artery calcifications seen in the visualized heart. No pericardial effusion.  No evidence of pulmonary embolus in the visualized pulmonary arteries.  Liver: No focal hepatic lesions seen.  Normal hepatic size. Hypodensity of the liver suggestive of steatosis.  Gallbladder and bile ducts: Cholecystectomy clips. No intra- or extra- hepatic biliary ductal dilatation.  Spleen: Normal appearance of the spleen.  Pancreas: Normal appearance of the pancreas. Main pancreatic duct is nondilated.  Adrenals: Normal appearance of the adrenal glands.  Kidneys: Bilateral renal cysts. Symmetric enhancement and excretion of contrast. No nephrolithiasis.  Normal caliber of the ureters.  Bowel: Postoperative changes of vertical sleeve gastrectomy. Normal caliber of the bowel. High density material to the colon is likely from recent fluoroscopic study. Appendix is not visualized but no secondary signs of appendicitis. Small hiatal hernia.  Pelvis: Normal appearance of the bladder. Prostatomegaly. Seminal vesicles appear normal.  Peritoneum: Stranding seen  along the upper abdomen. Small amount of free fluid in the pelvis and in the perisplenic space. There is a small focal fluid collection adjacent to the distal greater curvature of the stomach measuring approximately 4.6 x 3.6 x 2.2 cm. No rim enhancement. No free air. No peritoneal nodularity. A surgical drain is seen within the upper abdomen.  Vessels: Normal aortic caliber.  No atherosclerotic calcification of the aorta. Celiac artery, splenic artery, superior mesenteric artery, inferior mesenteric artery, renal arteries and iliac arteries appear patent. Iliac veins, inferior vena cava, superior mesenteric vein, renal veins, portal vein and splenic vein are patent.  Lymph nodes: No enlarged or suspicious adenopathy.  Bones: No acute osseous abnormality. Degenerative changes of the spine.  Soft tissues: Stranding and gas seen within the upper abdominal wall consistent with recent incision. Surgical skin staples seen. Diffuse body wall edema seen.      Impression: Postoperative changes of vertical sleeve gastrectomy with associated surrounding fat stranding and surgical drain.  Adjacent to the distal greater curvature there is a small focal fluid collection measuring up to 4.6 cm. No rim enhancement. This may represent a postoperative seroma though developing phlegmon/infection cannot be fully excluded  This report was finalized on 10/8/2022 10:09 PM by García Little.            I have reviewed the medications:  Scheduled Meds:amLODIPine, 10 mg, Oral, Q24H  carvedilol, 12.5 mg, Oral, Q12H  enoxaparin, 40 mg, Subcutaneous, Q12H  fluconazole, 400 mg, Intravenous, Daily  insulin lispro, 0-9 Units, Subcutaneous, 4x Daily AC & at Bedtime  losartan, 100 mg, Oral, Q24H  pantoprazole, 40 mg, Intravenous, Q12H  piperacillin-tazobactam, 3.375 g, Intravenous, Q8H  rosuvastatin, 10 mg, Oral, Daily  IV Fluids 1000 mL + additives, 100 mL/hr, Intravenous, Once  thiamine (B-1) IV, 100 mg, Intravenous, Daily      Continuous  Infusions:Adult Peripheral 2-in-1 TPN,   lactated ringers, 150 mL/hr, Last Rate: 150 mL/hr (10/05/22 1613)  Pharmacy to Dose TPN,   sodium chloride 0.45 % with KCl 20 mEq, 125 mL/hr, Last Rate: 125 mL/hr (10/09/22 0434)      PRN Meds:.•  acetaminophen  •  Albuterol Sulfate NEB Orderable  •  ALPRAZolam  •  benzonatate  •  diphenhydrAMINE  •  hydrALAZINE  •  HYDROmorphone **AND** naloxone  •  HYDROmorphone  •  LORazepam  •  LORazepam  •  Morphine **AND** naloxone  •  naloxone  •  [] ondansetron **FOLLOWED BY** ondansetron  •  ondansetron  •  oxyCODONE  •  Pharmacy to Dose TPN  •  phenol  •  prochlorperazine  •  promethazine  •  simethicone    Assessment & Plan   Assessment & Plan     Active Hospital Problems    Diagnosis  POA   • **Morbid obesity with body mass index (BMI) of 50.0 to 59.9 in adult ([facility]) [E66.01, Z68.43]  Not Applicable   • Hypertension [I10]  Yes   • Other hyperlipidemia [E78.49]  Yes   • JAD (obstructive sleep apnea) [G47.33]  Yes   • Type 2 diabetes mellitus without complication, with long-term current use of insulin ([facility]) [E11.9, Z79.4]  Not Applicable   • GERD (gastroesophageal reflux disease) [K21.9]  Yes   • Benign paroxysmal positional vertigo [H81.10]  Yes   • Asthma [J45.909]  Yes      Resolved Hospital Problems    Diagnosis Date Resolved POA   • Hiatal hernia [K44.9] 10/05/2022 Unknown        Brief Hospital Course to date:  Von Rodríguez is a 56 y.o.  male with a past medical history significant for hypertension, HLD, DM2, BPPV, GERD, asthma, JAD, and morbid obesity. Presents as a consult from Dr. Connelly. He is less than 24 hours s/p laparoscopic gastric sleeve and EGD. He is stable. Tolerated procedure well. Pain is controlled with PCA. Nurse is reporting oxygenation does drop mildly while patient is sleeping. He has worn CPAP remotely but wishes to avoid it if possible. Reports he still feels groggy from anesthesia, but otherwise no complaints. No fever, cough, congestion, SOB, or  "chest pain. No headache or focal weakness/paratehesias. Blood pressure has been elevated slightly. Antihypertensives resumed by bariatric service. Will continue to follow for medical management    Morbid Obesity S/P Lap Gastric Sleeve  Fever, CT imaging with post-op focal fluid collection 4.6 cm  - POD4 ( Connelly), low grade fever 10/8 and CT imaging with small focal fluid collection  - defer to bariatric surgery, TPN/PICC ordered today  - currently on IV abx   - close monitor on telemetry  - having bowel fxn      Hypertension  HLD  - currently stable --in fact on the lower side but adequate.   - resume Norvasc 10 mg, Coreg 12.5 mg, and cozaar 100 mg  - at time of discharge would likely hold on HCTZ component of hyzaar. Remainder of meds unchanged. Will write new Rx for losartan given he normally takes in combination form      DM2  - glucose stable. Hb A1c 6.2  - ok to resume home regimen unchanged at discharge     Asthma  JAD  - continue IS  - duo nebs with additional pulmonary toilet as needed- on RA during visit this AM  - encourage CPAP         Thank you for allowing [facility] [place] to provide consultative care for your patient, we will continue to follow while clinically appropriate.     Junie Reece MD  10/09/22                Electronically signed by Junie Reece MD at 10/09/22 1249     Pete Connelly MD at 10/08/22 1253        Cc: POD#3 lap converted to open LSG  \"don't feel as well, had a fever\"    He is sitting up in a chair at the side of the bed.  He said he does not feel as well today and had a temperature of \"100.8\" last evening.  Denies cough or dysuria.  Still with fairly significant pain only with oral intake.  He says it hurts most when it reaches his stomach and he gets pain and a lot of gas and bloating.  Says he continues to pass flatus.  He says the incisional pain is not too bad.  No pulmonary complaints, using his spirometer.  No bowel movement.    No fever " recorded but I can see current temp 98.1 pulse 103 respirations 18 blood pressure 135/89 saturation 95% UO NR  RIP 80 - 100 SS, not enteric decreased affect but in no apparent distress.  Abdomen seems appropriate and nontender, bowel sounds active.  Wounds look okay, no cellulitis or drainage.  RIP site looks fine.    CMP normal except for glucose of 126 CO2 21 albumin 3.30 ALT 67 AST 45.  White count normal at 6.93 with a normal differential.  H&H 11.3 and 36.8 with microcytic indices.    Impression: Postop day #3 laparoscopic converted to open sleeve gastrectomy.  Apparent low-grade fever last night.  Some tachycardia.  Poor oral intake secondary to abdominal pain.  Mild elevation in transaminases improving.  Low albumin.  Diabetes.  Iron deficiency anemia with stable H&H on Lovenox.  Hypertension.  Asthma.  Obstructive sleep apnea.  Hyperlipidemia    Plan: Check a prealbumin in the morning.  CT scan of the abdomen and pelvis today.  See orders.    Electronically signed by Pete Connelly MD at 10/08/22 1259     Junie Reece MD at 10/08/22 0901              [facility] Medicine Services  PROGRESS NOTE    Patient Name: Von Rodríguez  : 1965  MRN: [id number]    Date of Admission: 10/5/2022  Primary Care Physician: Provider, No Known    Subjective   Subjective     CC:  Med management post op sleeve     HPI:  Patient doing ok. Reports a lot of ongoing gas pain and appears slightly more uncomfortable than previous. Family at bedside. Reports fever overnight and did d/w nursing tech Tmax 100.9 axillary. Patient otherwise has no focal complaints and feels well.     ROS:  Gen- +fever as above  CV- No chest pain, palpitations  Resp- -cough, -dyspnea  GI- No N/V/D, +abd pain- gas pain        Objective   Objective     Vital Signs:   Temp:  [97.5 °F (36.4 °C)-98.8 °F (37.1 °C)] 98.1 °F (36.7 °C)  Heart Rate:  [] 88  Resp:  [16-18] 16  BP: (118-138)/(74-93) 120/74     Physical  Exam:  GEN: NAD, resting in chair, BMI 57  HEENT: on room air, atraumatic, normocephalic  NECK: supple, no masses  RESP: on room air, normal effort  CV: on tele, sinus rhythm  PSYCH: normal affect, appropriate  NEURO: awake, alert, no focal deficits noted  MSK: no edema noted  SKIN: no rashes noted       Results Reviewed:  LAB RESULTS:      Lab 10/08/22  0550 10/07/22  0644 10/06/22  0725 10/05/22  1913   WBC 6.93 6.96 8.31  --    HEMOGLOBIN 11.3* 11.7* 12.4* 13.5   HEMATOCRIT 36.8* 37.6 39.5 43.6   PLATELETS 213 204 218  --    NEUTROS ABS 3.88 4.54 6.45  --    IMMATURE GRANS (ABS) 0.02 0.02 0.02  --    LYMPHS ABS 2.19 1.62 0.97  --    MONOS ABS 0.71 0.76 0.87  --    EOS ABS 0.10 0.01 0.00  --    MCV 81.8 81.7 80.8  --          Lab 10/08/22  0550 10/07/22  0644 10/06/22  0725   SODIUM 136 138 139   POTASSIUM 4.5 4.4 4.9   CHLORIDE 103 104 103   CO2 21.0* 23.0 25.0   ANION GAP 12.0 11.0 11.0   BUN 15 18 22*   CREATININE 0.95 0.79 1.02   EGFR 95.1 105.6 87.3   GLUCOSE 126* 134* 157*   CALCIUM 9.0 9.0 9.1         Lab 10/08/22  0550 10/07/22  0644 10/06/22  0725   TOTAL PROTEIN 6.6 6.1 6.5   ALBUMIN 3.30* 3.50 4.00   GLOBULIN 3.3 2.6 2.5   ALT (SGPT) 67* 88* 108*   AST (SGOT) 45* 83* 92*   BILIRUBIN 0.5 0.4 0.3   ALK PHOS 54 53 48                 Lab 10/06/22  0725 10/05/22  1015   IRON 20*  --    ABO TYPING  --  AB   RH TYPING  --  Positive   ANTIBODY SCREEN  --  Negative         Brief Urine Lab Results     None          Microbiology Results Abnormal     None          FL Upper GI Single Contrast With KUB    Result Date: 10/7/2022  EXAMINATION: FL UPPER GI SINGLE CONTRAST W KUB-  INDICATION: sleeve water soluble; K44.9-Diaphragmatic hernia without obstruction or gangrene; Z68.43-Body mass index (BMI) 50.0-59.9, adult  TECHNIQUE: 24 seconds of fluoroscopic time was used for this exam. 7 associated fluoroscopic series were saved.  imaging reveals a nonobstructive bowel gas pattern. Surgical clips are visible across  the upper abdomen. A suture line is visible in the left upper quadrant. A RIP drain is visible across the left and right upper quadrants.  COMPARISON: NONE  FINDINGS: Under fluoroscopic observation, the patient ingested water-soluble contrast. The oral phase of deglutition appeared normal. The esophageal mucosa appeared grossly normal. There was no evidence of a focal esophageal stricture. Examination of the stomach demonstrated postoperative changes that are consistent with a vertical sleeve gastrectomy. No extravasation of contrast was seen. There is no delay in gastric emptying.       Impression: Status post vertical sleeve gastrectomy. There was no evidence of extraluminal contrast. There was no delay in gastric emptying.    This report was finalized on 10/7/2022 10:45 AM by Dr. Lit Mcgowan MD.            I have reviewed the medications:  Scheduled Meds:acetaminophen, , ,   amLODIPine, 10 mg, Oral, Q24H  carvedilol, 12.5 mg, Oral, Q12H  enoxaparin, 40 mg, Subcutaneous, Q12H  insulin lispro, 0-9 Units, Subcutaneous, 4x Daily AC & at Bedtime  losartan, 100 mg, Oral, Q24H  pantoprazole, 40 mg, Oral, Q AM  rosuvastatin, 10 mg, Oral, Daily  IV Fluids 1000 mL + additives, 100 mL/hr, Intravenous, Once      Continuous Infusions:lactated ringers, 150 mL/hr, Last Rate: 150 mL/hr (10/05/22 1613)  sodium chloride 0.45 % with KCl 20 mEq, 125 mL/hr, Last Rate: 125 mL/hr (10/07/22 1908)      PRN Meds:.•  acetaminophen  •  Albuterol Sulfate NEB Orderable  •  ALPRAZolam  •  benzonatate  •  diphenhydrAMINE  •  hydrALAZINE  •  HYDROmorphone **AND** naloxone  •  HYDROmorphone  •  LORazepam  •  LORazepam  •  Morphine **AND** naloxone  •  naloxone  •  ondansetron **FOLLOWED BY** [START ON 10/9/2022] ondansetron  •  ondansetron  •  oxyCODONE  •  phenol  •  prochlorperazine  •  promethazine  •  simethicone    Assessment & Plan   Assessment & Plan     Active Hospital Problems    Diagnosis  POA   • **Morbid obesity with body mass index  (BMI) of 50.0 to 59.9 in adult ([facility]) [E66.01, Z68.43]  Not Applicable   • Hypertension [I10]  Yes   • Other hyperlipidemia [E78.49]  Yes   • JAD (obstructive sleep apnea) [G47.33]  Yes   • Type 2 diabetes mellitus without complication, with long-term current use of insulin ([facility]) [E11.9, Z79.4]  Not Applicable   • GERD (gastroesophageal reflux disease) [K21.9]  Yes   • Benign paroxysmal positional vertigo [H81.10]  Yes   • Asthma [J45.909]  Yes      Resolved Hospital Problems    Diagnosis Date Resolved POA   • Hiatal hernia [K44.9] 10/05/2022 Unknown        Brief Hospital Course to date:  Von Rodríguez is a 56 y.o.  male with a past medical history significant for hypertension, HLD, DM2, BPPV, GERD, asthma, JAD, and morbid obesity. Presents as a consult from Dr. Connelly. He is less than 24 hours s/p laparoscopic gastric sleeve and EGD. He is stable. Tolerated procedure well. Pain is controlled with PCA. Nurse is reporting oxygenation does drop mildly while patient is sleeping. He has worn CPAP remotely but wishes to avoid it if possible. Reports he still feels groggy from anesthesia, but otherwise no complaints. No fever, cough, congestion, SOB, or chest pain. No headache or focal weakness/paratehesias. Blood pressure has been elevated slightly. Antihypertensives resumed by bariatric service. Will continue to follow for medical management    Morbid Obesity S/P Lap Gastric Sleeve  - POD3 ( [place]), doing well   - defer to bariatric surgery and wound care  - close monitor on telemetry  - having bowel fxn      Hypertension  HLD  - currently stable --in fact on the lower side but adequate.   - resume Norvasc 10 mg, Coreg 12.5 mg, and cozaar 100 mg  - have d/w patient that if discharged today would hold on HCTZ component at discharge. Remainder of meds unchanged. Will write new Rx for losartan given he normally takes in combination form      DM2  - glucose stable. Hb A1c 6.2  - ok to resume home regimen unchanged at  "discharge     Asthma  JAD  - continue IS  - duo nebs with additional pulmonary toilet as needed- on RA during visit this AM  - encourage CPAP    Ok for discharge if cleared by primary team. Will adjust med rec for anti-HTN and diabetic medications.         Thank you for allowing [facility] to provide consultative care for your patient, we will continue to follow while clinically appropriate.     Junie Reece MD  10/08/22                Electronically signed by Junie Reece MD at 10/08/22 0908     Pete Connelly MD at 10/07/22 1628        Cc: POD#2 laparoscopic converted to open sleeve gastrectomy \"feel OK\"    He is sitting up in a chair at the side of the bed alone in the room.  He says the soreness is not too bad.  His main complaint is pain with oral intake especially the shakes even when thinning it out with water.  Symptoms present but more tolerable with water and lemonade and the thinner liquids.  No nausea or vomiting.  Passing some flatus, no bowel movement.  Says he is voiding well.  No pulmonary complaints using his spirometer and getting it over 2000    No fever pulse 99 respirations 18 blood pressure 127/93 saturation 94%  - NR  RIP 40 - 80 SS he is in no apparent distress and conversant.  Abdomen is soft and appropriate.  Wound dressings removed, wounds look okay.  Bowel sounds present but hypoactive    Labs reviewed glucose 134 ALT 88 AST 83 normal white count with normal differential H&H 11.7 and 37.6 with microcytic indices.    Impression: Postop day #2 laparoscopic converted to open sleeve gastrectomy.  Iron deficiency anemia without evidence of bleeding on Lovenox.  Pain with oral intake as above.  Elevated transaminases improving.  Insulin-dependent diabetes.  Hypertension with earlier labile blood pressure.  JAD.    Plan: Continue liquids as tolerated, out of bed, pulmonary toilet, VTE prophylaxis.  May shower with RIP drain.  Appreciate hospitalist " assistance.  He admits he is not ready to go home as he thinks he would become dehydrated from suboptimal oral intake secondary to pain with drinking as above.  He is hoping to go home tomorrow.  See orders    Electronically signed by Pete Connelly MD at 10/07/22 1633       Consult Notes (last 72 hours)  Notes from 10/07/22 1102 through 10/10/22 1102   No notes of this type exist for this encounter.

## 2022-10-10 NOTE — CONSULTS
Clinical Nutrition     Nutrition Assessment  Reason for Visit:   PN      Patient Name: Von Rodríguez  YOB: 1965  MRN: [id number]  Date of Encounter: 10/09/22 20:58 EDT  Admission date: 10/5/2022      Comments:  MD order for std PPN at 125 ml/hr 250 ml SMOF lipid 3x/wk. Pt geting PICC RD changed order to D12.5% AA 5.5% to goal 80 ml/hr 250 ml SMOF Lipid 3x/wk to deliver rx 2 L vs 3 L.      Admission Diagnosis    Body mass index (BMI) of 50-59.9 in adult ([facility]) [Z68.43]  Hiatal hernia [K44.9]  Morbid obesity with body mass index (BMI) of 50.0 to 59.9 in adult ([facility]) [E66.01, Z68.43]     Hospital Problem List    Morbid obesity with body mass index (BMI) of 50.0 to 59.9 in adult ([facility])    Asthma    Hypertension    Other hyperlipidemia    JAD (obstructive sleep apnea)    GERD (gastroesophageal reflux disease)    Benign paroxysmal positional vertigo    Type 2 diabetes mellitus without complication, with long-term current use of insulin ([facility])      Other Applicable: pt avoided Tob pre sgy. Note in 2021 Zonia en Y had been attempted at OSH but aborted 2/2 trocar torque     Applicable Interval History:  10/5 Lap Sleeve Gastrectomy    Applicable PMH/PSxH:     PMH: He  has a past medical history of Abnormal PFT, Asthma, Back pain, Benign paroxysmal positional vertigo, Candidal intertrigo, Cornea transplant recipient, Elevated alanine aminotransferase (ALT) level, Heartburn, Helicobacter pylori ab+, Hiatal hernia with gastroesophageal reflux, Hypertension, Incisional hernia, Microcytic erythrocytes, JAD (obstructive sleep apnea), Other hyperlipidemia, S/P cholecystectomy (1993), Type 2 diabetes mellitus without complication, with long-term current use of insulin ([facility]), and Wears glasses.   PSxH: He  has a past surgical history that includes Laparoscopic cholecystectomy (1993); Diagnostic laparoscopy; Tonsillectomy and adenoidectomy (2007); Uvuloplasty; Colonoscopy; Corneal transplant (Left);  "Esophagogastroduodenoscopy; [place] tooth extraction; Gastric Sleeve (N/A, 10/5/2022); Esophagogastroduodenoscopy (N/A, 10/5/2022); and Exploratory Laparotomy (N/A, 10/5/2022).         Diet/Nutrition Related History:     Per Pharm PN not started pending PICC.       Labs reviewed   Yes    Results from last 7 days   Lab Units 10/09/22  0605 10/08/22  0550 10/07/22  0644   GLUCOSE mg/dL 121* 126* 134*   BUN mg/dL 11 15 18   CREATININE mg/dL 0.66* 0.95 0.79   SODIUM mmol/L 137 136 138   CHLORIDE mmol/L 103 103 104   POTASSIUM mmol/L 4.4 4.5 4.4   PHOSPHORUS mg/dL 2.0*  --   --    MAGNESIUM mg/dL 1.7  --   --    ALT (SGPT) U/L 58* 67* 88*     Results from last 7 days   Lab Units 10/09/22  0605 10/08/22  0550 10/07/22  0644   ALBUMIN g/dL 3.20* 3.30* 3.50   PREALBUMIN mg/dL 11.3*  --   --    CRP mg/dL 13.13*  --   --    CHOLESTEROL mg/dL 103  --   --    TRIGLYCERIDES mg/dL 116  --   --          Lab 10/09/22  1303   LACTATE 1.3        Results from last 7 days   Lab Units 10/09/22  1945 10/09/22  1559 10/09/22  1140 10/09/22  0734 10/08/22  2143 10/08/22  1626   GLUCOSE mg/dL 119 119 137* 116 122 115       Lab Results   Lab Value Date/Time    HGBA1C 6.20 (H) 09/30/2022 0937    HGBA1C 6.10 (H) 05/26/2022 1043       Medications reviewed   Yes  Insulin, Protonix, Abx, Thiamin        Intake/Ouptut 24 hrs reviewed   Yes  Intake & Output (last day)       10/09 0701  10/10 0700    I.V. (mL/kg)     Total Intake(mL/kg)     Urine (mL/kg/hr)     Drains     Total Output     Net          Urine Unmeasured Occurrence 1 x          Anthropometrics     Admission Height 174 cm (68.5\") Documented at 10/05/2022 1548   Admission Weight 172 kg (379 lb 3.1 oz) Documented at 10/05/2022 1548       Height: 174 cm (68.5\")    Last filed wt: Weight: (!) 172 kg (379 lb 3.1 oz) (10/05/22 1548)       BMI: BMI (Calculated): 56.8  Obese Class III extreme obesity: > or equal to 40kg/m2    Ideal Body Weight (IBW) (kg): 72.3      Weight Change   UBW: per EMR rpt " of 376 lbs on 5/26 Per MD note max lifetime wt 370 lbs  Weight change:  % wt change:   Time frame of weight loss:      Needs Assessment  Date: 10/9   Height used:   Weight used: 172 Kg  IBW: 71,36 Kg    Estimated Energy needs:  ~ 1900 Kcal/da based on 10-12 Kcal/Kg      Estimated Protein needs:  ~ 143 g/da based on 2g/Kg IBW      Estimated Fluid needs: per clinical status      Current Nutrition Prescription     PO: Diet Bariatric; Sleeve; 1; Stage 1 Clear Liquid Sugar Free (No Carbonation, No Straw)  Adult Cyclic 2-in-1 TPN    Intake:  insuffic data    PN: D12.5% AA 5.5% goal 80 ml/hr 250 ml SMOF Lipid 3x/wk    Route: PICC    At goal over 24 hr PN rx will supply:   1452 kcal 75% est need, 106 g PRO 74% est need     Formula/Rate Verified at Bedside: No not started    Delivery:         Nutrition Diagnosis     10/9  Problem Anticipated inadequate oral intake   Etiology Alt GI fx   Signs/Symptoms Post surg diet    Status:PN order placed      Goal:   General: Nutrition support treatment  PO: Establish PO   PN: initiate PN meet min 75% needs  Additional goals:      Nutrition Intervention     Follow treatment progress, Care plan reviewed, PN order communicated to Pharm      Monitoring/Evaluation:   Per protocol, I&O, PO intake, Supplement intake, Pertinent labs, PN delivery/tolerance, Weight, GI status, Symptoms        Alla Witt RD,   Time Spent: 45 min

## 2022-10-10 NOTE — CONSULTS
Clinical Nutrition     Nutrition Assessment  Reason for Visit:   Follow-up protocol, PN      Patient Name: Von Rodríguez  YOB: 1965  MRN: [id number]  Date of Encounter: 10/10/22 11:42 EDT  Admission date: 10/5/2022      Comments:     Current TPN order running at 40mL/hr. D12.5% AA 5.5% to goal 80 ml/hr 250 ml SMOF Lipid 3x/wk to deliver rx 2 L vs 3 L.      Pt tolerating limited PO intake - c/o pain and nausea with premier protein, able to tolerate sips of lemonade and tea. Discussed trying ProSource No carb as a 4oz drink, agreeable.     As pt has limited PO intake at this time, will change TPN regimen to provide higher percentage of protein-energy needs.     Initiate TPN via PICC of 13% dextrose (251g), 7.5% AA (144g) @ goal rate 80mL/hr. 200mL 20% SMOF daily.   At goal this regimen provides 1829kcal(96% est needs) and 144g protein (100% est needs). TGV=1.92L, GIR=1.0mg/kg/min    Increase Thiamine to 200mg q8hrs and add folic acid QD    Assessment     Admission Diagnosis    Body mass index (BMI) of 50-59.9 in adult ([facility]) [Z68.43]  Hiatal hernia [K44.9]  Morbid obesity with body mass index (BMI) of 50.0 to 59.9 in adult ([facility]) [E66.01, Z68.43]     Hospital Problem List    Morbid obesity with body mass index (BMI) of 50.0 to 59.9 in adult ([facility])    Asthma    Hypertension    Other hyperlipidemia    JAD (obstructive sleep apnea)    GERD (gastroesophageal reflux disease)    Benign paroxysmal positional vertigo    Type 2 diabetes mellitus without complication, with long-term current use of insulin ([facility])      Other Applicable: pt avoided Tob pre sgy. Note in 2021 Zonia en Y had been attempted at OSH but aborted 2/2 trocar torque     Applicable Interval History:  10/5 Lap Sleeve Gastrectomy  10/9 PICC placed; TPN initiated  10/10 UGI, CT abdomen w/o contrast    Applicable PMH/PSxH:     PMH: He  has a past medical history of Abnormal PFT, Asthma, Back pain, Benign paroxysmal positional  vertigo, Candidal intertrigo, Cornea transplant recipient, Elevated alanine aminotransferase (ALT) level, Heartburn, Helicobacter pylori ab+, Hiatal hernia with gastroesophageal reflux, Hypertension, Incisional hernia, Microcytic erythrocytes, JAD (obstructive sleep apnea), Other hyperlipidemia, S/P cholecystectomy (1993), Type 2 diabetes mellitus without complication, with long-term current use of insulin ([facility]), and Wears glasses.   PSxH: He  has a past surgical history that includes Laparoscopic cholecystectomy (1993); Diagnostic laparoscopy; Tonsillectomy and adenoidectomy (2007); Uvuloplasty; Colonoscopy; Corneal transplant (Left); Esophagogastroduodenoscopy; [place] tooth extraction; Gastric Sleeve (N/A, 10/5/2022); Esophagogastroduodenoscopy (N/A, 10/5/2022); and Exploratory Laparotomy (N/A, 10/5/2022).         Diet/Nutrition Related History:     10/10  Pt with TPN running at 40mL/hr. Reporting continued intolerance of protein shakes, only able to accept sips of lemonade and tea. Reports pain induced nausea may be a limiting factor for increasing PO intake. Discussed alternative protein that may be more easily accepted & pt agreeable. Pt reports desire for soft foods but verbalized understanding of not being advanced to particular stage of bariatric diet. Denies difficulty chewing/swallowing; however endorsed able to feel pills as swallowed. Endorsed BM today (10/10) and passing flatus. Expressed concern for taking PO meds on an empty stomach.      10/9  Per Pharm PN not started pending PICC.       Labs reviewed   Yes    Results from last 7 days   Lab Units 10/10/22  0619 10/09/22  0605 10/08/22  0550   GLUCOSE mg/dL 158* 121* 126*   BUN mg/dL 9 11 15   CREATININE mg/dL 0.72* 0.66* 0.95   SODIUM mmol/L 139 137 136   CHLORIDE mmol/L 103 103 103   POTASSIUM mmol/L 4.0 4.4 4.5   PHOSPHORUS mg/dL 2.8 2.0*  --    MAGNESIUM mg/dL 1.6 1.7  --    ALT (SGPT) U/L 57* 58* 67*     Results from last 7 days   Lab Units  "10/10/22  0619 10/09/22  0605 10/08/22  0550   ALBUMIN g/dL 3.30* 3.20* 3.30*   PREALBUMIN mg/dL 10.7* 11.3*  --    CRP mg/dL  --  13.13*  --    IONIZED CALCIUM mmol/L 1.35*  --   --    CHOLESTEROL mg/dL  --  103  --    TRIGLYCERIDES mg/dL  --  116  --          Lab 10/10/22  0619 10/09/22  1303   LACTATE 0.9 1.3        Results from last 7 days   Lab Units 10/10/22  1128 10/10/22  0752 10/09/22  1945 10/09/22  1559 10/09/22  1140 10/09/22  0734   GLUCOSE mg/dL 161* 159* 119 119 137* 116       Lab Results   Lab Value Date/Time    HGBA1C 6.20 (H) 09/30/2022 0937    HGBA1C 6.10 (H) 05/26/2022 1043       Medications reviewed   Yes  Scheduled: dicyclomine, Insulin, Protonix, Abx, Thiamine,    GTT: LR @ 150mL/hr, HNS @ 125mL/hr  PRN: electrolyte replacement    Intake/Ouptut 24 hrs reviewed   Yes  Intake & Output (last day)       10/09 0701  10/10 0700 10/10 0701  10/11 0700    I.V. (mL/kg)      Total Intake(mL/kg)      Urine (mL/kg/hr)  0 (0)    Drains  40    Stool  0    Total Output  40    Net  -40          Urine Unmeasured Occurrence 3 x 1 x    Stool Unmeasured Occurrence  1 x          Anthropometrics     Admission Height 174 cm (68.5\") Documented at 10/05/2022 1548   Admission Weight 172 kg (379 lb 3.1 oz) Documented at 10/05/2022 1548       Height: 174 cm (68.5\")    Last filed wt: Weight: (!) 172 kg (379 lb 3.1 oz) (10/05/22 1548)       BMI: BMI (Calculated): 56.8  Obese Class III extreme obesity: > or equal to 40kg/m2    Ideal Body Weight (IBW) (kg): 72.3      Weight Change   UBW: per EMR rpt of 376 lbs on 5/26 Per MD note max lifetime wt 370 lbs  Weight change:  % wt change:   Time frame of weight loss:      Needs Assessment  Date: 10/9   Height used:   Weight used: 172 Kg  IBW: 71.36 Kg    Estimated Energy needs:  ~ 1900 Kcal/da based on 10-12 Kcal/Kg      Estimated Protein needs:  ~ 143 g/da based on 2g/Kg IBW      Estimated Fluid needs: per clinical status      Current Nutrition Prescription     PO: Diet " Bariatric; Sleeve; 1; Stage 1 Clear Liquid Sugar Free (No Carbonation, No Straw)  Adult Cyclic 2-in-1 TPN    Intake:  insuffic data    PN: D12.5% AA 5.5% goal 80 ml/hr 250 ml SMOF Lipid 3x/wk    Route: PICC    At goal over 24 hr PN rx will supply:   1452 kcal 75% est need, 106 g PRO 74% est need     Formula/Rate Verified at Bedside: yes    Delivery:   Currently running at 40mL/hr; no volume documented in I&Os      Nutrition Diagnosis     10/9, 10/10  Problem Anticipated inadequate oral intake   Etiology Alt GI fx   Signs/Symptoms Post surg diet    Status:PN order placed      Goal:   General: Nutrition support treatment  PO: Tolerate PO, Increase intake   PN: Adjust PN; PN to PO  Additional goals:      Nutrition Intervention   1.Follow treatment progress, Care plan reviewed, PN order communicated to Pharm    2. ProSource No carb as a 4oz drink, agreeable.     3. As pt has limited PO intake at this time, will change TPN regimen to provide higher percentage of protein-energy needs.     Initiate TPN via PICC of 13% dextrose (251g), 7.5% AA (144g) @ goal rate 80mL/hr. 200mL 20% SMOF daily.   At goal this regimen provides 1829kcal(96% est needs) and 144g protein (100% est needs). TGV=1.92L, GIR=1.0mg/kg/min    4. Increase Thiamine to 200mg q8hrs and add folic acid QD    5. Strict I&Os    Monitoring/Evaluation:   Per protocol, I&O, PO intake, Supplement intake, Pertinent labs, PN delivery/tolerance, Weight, GI status, Symptoms      Mary Knott, MS,RD,LD,   Time Spent: 60 min

## 2022-10-11 ENCOUNTER — APPOINTMENT (OUTPATIENT)
Dept: GENERAL RADIOLOGY | Facility: HOSPITAL | Age: 57
End: 2022-10-11

## 2022-10-11 VITALS
RESPIRATION RATE: 16 BRPM | HEART RATE: 85 BPM | HEIGHT: 69 IN | OXYGEN SATURATION: 94 % | WEIGHT: 315 LBS | BODY MASS INDEX: 46.65 KG/M2 | DIASTOLIC BLOOD PRESSURE: 108 MMHG | TEMPERATURE: 98.3 F | SYSTOLIC BLOOD PRESSURE: 152 MMHG

## 2022-10-11 LAB
ALBUMIN SERPL-MCNC: 3.2 G/DL (ref 3.5–5.2)
ALBUMIN/GLOB SERPL: 1.2 G/DL
ALP SERPL-CCNC: 56 U/L (ref 39–117)
ALT SERPL W P-5'-P-CCNC: 51 U/L (ref 1–41)
ANION GAP SERPL CALCULATED.3IONS-SCNC: 12 MMOL/L (ref 5–15)
AST SERPL-CCNC: 30 U/L (ref 1–40)
BASOPHILS # BLD AUTO: 0.04 10*3/MM3 (ref 0–0.2)
BASOPHILS NFR BLD AUTO: 0.6 % (ref 0–1.5)
BILIRUB SERPL-MCNC: 0.4 MG/DL (ref 0–1.2)
BUN SERPL-MCNC: 11 MG/DL (ref 6–20)
BUN/CREAT SERPL: 15.9 (ref 7–25)
CA-I SERPL ISE-MCNC: 1.31 MMOL/L (ref 1.12–1.32)
CALCIUM SPEC-SCNC: 8.9 MG/DL (ref 8.6–10.5)
CHLORIDE SERPL-SCNC: 104 MMOL/L (ref 98–107)
CO2 SERPL-SCNC: 23 MMOL/L (ref 22–29)
CREAT SERPL-MCNC: 0.69 MG/DL (ref 0.76–1.27)
D-LACTATE SERPL-SCNC: 1.1 MMOL/L (ref 0.5–2)
DEPRECATED RDW RBC AUTO: 44 FL (ref 37–54)
EGFRCR SERPLBLD CKD-EPI 2021: 110 ML/MIN/1.73
EOSINOPHIL # BLD AUTO: 0.59 10*3/MM3 (ref 0–0.4)
EOSINOPHIL NFR BLD AUTO: 8.6 % (ref 0.3–6.2)
ERYTHROCYTE [DISTWIDTH] IN BLOOD BY AUTOMATED COUNT: 15.3 % (ref 12.3–15.4)
GLOBULIN UR ELPH-MCNC: 2.6 GM/DL
GLUCOSE BLDC GLUCOMTR-MCNC: 184 MG/DL (ref 70–130)
GLUCOSE BLDC GLUCOMTR-MCNC: 189 MG/DL (ref 70–130)
GLUCOSE SERPL-MCNC: 201 MG/DL (ref 65–99)
HCT VFR BLD AUTO: 33.7 % (ref 37.5–51)
HGB BLD-MCNC: 10.7 G/DL (ref 13–17.7)
IMM GRANULOCYTES # BLD AUTO: 0.06 10*3/MM3 (ref 0–0.05)
IMM GRANULOCYTES NFR BLD AUTO: 0.9 % (ref 0–0.5)
LYMPHOCYTES # BLD AUTO: 1.72 10*3/MM3 (ref 0.7–3.1)
LYMPHOCYTES NFR BLD AUTO: 24.9 % (ref 19.6–45.3)
MAGNESIUM SERPL-MCNC: 2 MG/DL (ref 1.6–2.6)
MCH RBC QN AUTO: 25.4 PG (ref 26.6–33)
MCHC RBC AUTO-ENTMCNC: 31.8 G/DL (ref 31.5–35.7)
MCV RBC AUTO: 79.9 FL (ref 79–97)
MONOCYTES # BLD AUTO: 0.64 10*3/MM3 (ref 0.1–0.9)
MONOCYTES NFR BLD AUTO: 9.3 % (ref 5–12)
NEUTROPHILS NFR BLD AUTO: 3.85 10*3/MM3 (ref 1.7–7)
NEUTROPHILS NFR BLD AUTO: 55.7 % (ref 42.7–76)
NRBC BLD AUTO-RTO: 0 /100 WBC (ref 0–0.2)
PHOSPHATE SERPL-MCNC: 2.4 MG/DL (ref 2.5–4.5)
PLATELET # BLD AUTO: 289 10*3/MM3 (ref 140–450)
PMV BLD AUTO: 8.8 FL (ref 6–12)
POTASSIUM SERPL-SCNC: 4.6 MMOL/L (ref 3.5–5.2)
PROCALCITONIN SERPL-MCNC: 0.17 NG/ML (ref 0–0.25)
PROT SERPL-MCNC: 5.8 G/DL (ref 6–8.5)
RBC # BLD AUTO: 4.22 10*6/MM3 (ref 4.14–5.8)
SODIUM SERPL-SCNC: 139 MMOL/L (ref 136–145)
WBC NRBC COR # BLD: 6.9 10*3/MM3 (ref 3.4–10.8)

## 2022-10-11 PROCEDURE — 84100 ASSAY OF PHOSPHORUS: CPT | Performed by: SURGERY

## 2022-10-11 PROCEDURE — 63710000001 INSULIN LISPRO (HUMAN) PER 5 UNITS: Performed by: SURGERY

## 2022-10-11 PROCEDURE — 25010000002 FLUCONAZOLE PER 200 MG: Performed by: SURGERY

## 2022-10-11 PROCEDURE — 85025 COMPLETE CBC W/AUTO DIFF WBC: CPT | Performed by: SURGERY

## 2022-10-11 PROCEDURE — 84145 PROCALCITONIN (PCT): CPT | Performed by: SURGERY

## 2022-10-11 PROCEDURE — 25010000002 THIAMINE PER 100 MG: Performed by: SURGERY

## 2022-10-11 PROCEDURE — 99024 POSTOP FOLLOW-UP VISIT: CPT | Performed by: SURGERY

## 2022-10-11 PROCEDURE — 82962 GLUCOSE BLOOD TEST: CPT

## 2022-10-11 PROCEDURE — 25010000002 PIPERACILLIN SOD-TAZOBACTAM PER 1 G: Performed by: SURGERY

## 2022-10-11 PROCEDURE — 83605 ASSAY OF LACTIC ACID: CPT | Performed by: SURGERY

## 2022-10-11 PROCEDURE — 99232 SBSQ HOSP IP/OBS MODERATE 35: CPT | Performed by: NURSE PRACTITIONER

## 2022-10-11 PROCEDURE — 71045 X-RAY EXAM CHEST 1 VIEW: CPT

## 2022-10-11 PROCEDURE — 80053 COMPREHEN METABOLIC PANEL: CPT | Performed by: SURGERY

## 2022-10-11 PROCEDURE — 0 POTASSIUM CHLORIDE PER 2 MEQ: Performed by: SURGERY

## 2022-10-11 PROCEDURE — 82330 ASSAY OF CALCIUM: CPT | Performed by: SURGERY

## 2022-10-11 PROCEDURE — 83735 ASSAY OF MAGNESIUM: CPT | Performed by: SURGERY

## 2022-10-11 PROCEDURE — 25010000002 ENOXAPARIN PER 10 MG: Performed by: SURGERY

## 2022-10-11 PROCEDURE — 25010000002 HYDRALAZINE PER 20 MG: Performed by: SURGERY

## 2022-10-11 RX ORDER — BENZONATATE 100 MG/1
100 CAPSULE ORAL 3 TIMES DAILY PRN
Start: 2022-10-11

## 2022-10-11 RX ORDER — FLUCONAZOLE 150 MG/1
150 TABLET ORAL DAILY
Qty: 14 TABLET | Refills: 0 | Status: SHIPPED | OUTPATIENT
Start: 2022-10-11 | End: 2022-10-25

## 2022-10-11 RX ORDER — OMEPRAZOLE 40 MG/1
40 CAPSULE, DELAYED RELEASE ORAL DAILY
Qty: 60 CAPSULE | Refills: 0 | Status: SHIPPED | OUTPATIENT
Start: 2022-10-11 | End: 2022-10-28 | Stop reason: SDUPTHER

## 2022-10-11 RX ORDER — LOSARTAN POTASSIUM 100 MG/1
100 TABLET ORAL DAILY
Qty: 30 TABLET | Refills: 0 | Status: SHIPPED | OUTPATIENT
Start: 2022-10-11

## 2022-10-11 RX ORDER — LANOLIN ALCOHOL/MO/W.PET/CERES
100 CREAM (GRAM) TOPICAL DAILY
Qty: 30 TABLET | Refills: 1 | Status: SHIPPED | OUTPATIENT
Start: 2022-10-11

## 2022-10-11 RX ORDER — THIAMINE HYDROCHLORIDE 100 MG/ML
100 INJECTION, SOLUTION INTRAMUSCULAR; INTRAVENOUS DAILY
Qty: 14 ML | Refills: 0 | Status: SHIPPED | OUTPATIENT
Start: 2022-10-11 | End: 2022-10-25

## 2022-10-11 RX ORDER — CARVEDILOL 12.5 MG/1
12.5 TABLET ORAL EVERY 12 HOURS SCHEDULED
Qty: 60 TABLET | Refills: 1 | Status: SHIPPED | OUTPATIENT
Start: 2022-10-11

## 2022-10-11 RX ORDER — AMOXICILLIN AND CLAVULANATE POTASSIUM 875; 125 MG/1; MG/1
1 TABLET, FILM COATED ORAL 2 TIMES DAILY
Qty: 28 TABLET | Refills: 0 | Status: SHIPPED | OUTPATIENT
Start: 2022-10-11 | End: 2022-10-25

## 2022-10-11 RX ORDER — ALBUTEROL SULFATE 2.5 MG/3ML
2.5 SOLUTION RESPIRATORY (INHALATION) EVERY 4 HOURS PRN
Refills: 12
Start: 2022-10-11

## 2022-10-11 RX ORDER — GUAIFENESIN/DEXTROMETHORPHAN 100-10MG/5
5 SYRUP ORAL EVERY 4 HOURS PRN
Qty: 354 ML | Refills: 1 | Status: SHIPPED | OUTPATIENT
Start: 2022-10-11

## 2022-10-11 RX ORDER — DICYCLOMINE HYDROCHLORIDE 10 MG/1
10 CAPSULE ORAL 4 TIMES DAILY PRN
Qty: 120 CAPSULE | Refills: 0 | Status: SHIPPED | OUTPATIENT
Start: 2022-10-11

## 2022-10-11 RX ORDER — AMOXICILLIN AND CLAVULANATE POTASSIUM 875; 125 MG/1; MG/1
1 TABLET, FILM COATED ORAL 2 TIMES DAILY
Qty: 10 TABLET | Refills: 0 | Status: CANCELLED | OUTPATIENT
Start: 2022-10-11 | End: 2022-10-16

## 2022-10-11 RX ORDER — PANTOPRAZOLE SODIUM 40 MG/1
40 TABLET, DELAYED RELEASE ORAL DAILY
Qty: 30 TABLET | Refills: 1 | Status: CANCELLED | OUTPATIENT
Start: 2022-10-11

## 2022-10-11 RX ORDER — ENOXAPARIN SODIUM 100 MG/ML
40 INJECTION SUBCUTANEOUS EVERY 12 HOURS
Qty: 11.2 ML | Refills: 0 | Status: SHIPPED | OUTPATIENT
Start: 2022-10-11 | End: 2022-10-25

## 2022-10-11 RX ORDER — ONDANSETRON 4 MG/1
4 TABLET, FILM COATED ORAL EVERY 6 HOURS PRN
Qty: 20 TABLET | Refills: 0 | Status: SHIPPED | OUTPATIENT
Start: 2022-10-11

## 2022-10-11 RX ORDER — AMLODIPINE BESYLATE 10 MG/1
10 TABLET ORAL
Qty: 30 TABLET | Refills: 1 | Status: SHIPPED | OUTPATIENT
Start: 2022-10-12

## 2022-10-11 RX ORDER — SIMETHICONE 80 MG
80 TABLET,CHEWABLE ORAL 4 TIMES DAILY
Qty: 120 TABLET | Refills: 1 | Status: SHIPPED | OUTPATIENT
Start: 2022-10-11

## 2022-10-11 RX ORDER — PANTOPRAZOLE SODIUM 40 MG/10ML
40 INJECTION, POWDER, LYOPHILIZED, FOR SOLUTION INTRAVENOUS EVERY 12 HOURS SCHEDULED
Qty: 280 ML | Refills: 0 | Status: SHIPPED | OUTPATIENT
Start: 2022-10-11 | End: 2022-10-25

## 2022-10-11 RX ADMIN — ROSUVASTATIN CALCIUM 10 MG: 10 TABLET, FILM COATED ORAL at 08:28

## 2022-10-11 RX ADMIN — ENOXAPARIN SODIUM 40 MG: 40 INJECTION SUBCUTANEOUS at 08:27

## 2022-10-11 RX ADMIN — TAZOBACTAM SODIUM AND PIPERACILLIN SODIUM 3.38 G: 375; 3 INJECTION, SOLUTION INTRAVENOUS at 11:26

## 2022-10-11 RX ADMIN — THIAMINE HYDROCHLORIDE 200 MG: 100 INJECTION, SOLUTION INTRAMUSCULAR; INTRAVENOUS at 08:27

## 2022-10-11 RX ADMIN — FOLIC ACID 1 MG: 5 INJECTION, SOLUTION INTRAMUSCULAR; INTRAVENOUS; SUBCUTANEOUS at 08:27

## 2022-10-11 RX ADMIN — POTASSIUM CHLORIDE AND SODIUM CHLORIDE 125 ML/HR: 450; 150 INJECTION, SOLUTION INTRAVENOUS at 08:28

## 2022-10-11 RX ADMIN — HYDRALAZINE HYDROCHLORIDE 10 MG: 20 INJECTION INTRAMUSCULAR; INTRAVENOUS at 04:08

## 2022-10-11 RX ADMIN — CARVEDILOL 12.5 MG: 12.5 TABLET, FILM COATED ORAL at 08:28

## 2022-10-11 RX ADMIN — Medication 10 ML: at 08:27

## 2022-10-11 RX ADMIN — INSULIN LISPRO 2 UNITS: 100 INJECTION, SOLUTION INTRAVENOUS; SUBCUTANEOUS at 08:27

## 2022-10-11 RX ADMIN — INSULIN LISPRO 2 UNITS: 100 INJECTION, SOLUTION INTRAVENOUS; SUBCUTANEOUS at 11:26

## 2022-10-11 RX ADMIN — LOSARTAN POTASSIUM 100 MG: 50 TABLET, FILM COATED ORAL at 08:27

## 2022-10-11 RX ADMIN — FLUCONAZOLE 400 MG: 400 INJECTION, SOLUTION INTRAVENOUS at 08:27

## 2022-10-11 RX ADMIN — SODIUM PHOSPHATE, MONOBASIC, MONOHYDRATE 15 MMOL: 276; 142 INJECTION, SOLUTION INTRAVENOUS at 11:26

## 2022-10-11 RX ADMIN — TAZOBACTAM SODIUM AND PIPERACILLIN SODIUM 3.38 G: 375; 3 INJECTION, SOLUTION INTRAVENOUS at 04:08

## 2022-10-11 RX ADMIN — AMLODIPINE BESYLATE 10 MG: 10 TABLET ORAL at 08:28

## 2022-10-11 RX ADMIN — BENZONATATE 100 MG: 100 CAPSULE ORAL at 15:17

## 2022-10-11 RX ADMIN — PANTOPRAZOLE SODIUM 40 MG: 40 INJECTION, POWDER, FOR SOLUTION INTRAVENOUS at 08:27

## 2022-10-11 NOTE — CASE MANAGEMENT/SOCIAL WORK
Case Management Discharge Note      Final Note: Patient expected to be discharged home today. He will have TPN/Lipids provided by [facility] [place] home infusion (covered at 100%) and will go to [facility] [place] [facility]patient infusion weekly for labs and picc dressing changes. He is independent of ADLs and will return home with his spouse. Appointments and follow ups in the AVS. No other dc needs identified - sammy [phone]         Selected Continued Care - Admitted Since 10/5/2022     Destination    No services have been selected for the patient.              Durable Medical Equipment    No services have been selected for the patient.              Dialysis/Infusion     Service Provider Selected Services Address Phone Fax Patient Preferred    [facility] In[facility] IV Therapy [address], [place] [id number] [phone] --          Home Medical Care    No services have been selected for the patient.              Therapy    No services have been selected for the patient.              Community Resources    No services have been selected for the patient.              Community & DME    No services have been selected for the patient.                       Final Discharge Disposition Code: 01 - home or self-care

## 2022-10-11 NOTE — PROGRESS NOTES
Nutrition Services    Patient Name:  Von Rodríguez  YOB: 1965  MRN: [id number]  Admit Date:  10/5/2022    Pt with improved acceptance of premier protein shakes reported this morning. Plan to d/c home with cyclic TPN. As pt has increased PO calorie acceptance, home TPN calculated to meet ~75% of est needs.     Home TPN recommendation:     Cyclic TPN over 10hr duration to provide: 190g dextrose, 107g AA.   Rate: 80mL x1hr, 175mL x8hrs, 80mL x1hr.   Lipids 3x/wk: 70g     GV: 1.56mL  Max GIR: 2.3     At goal this regimen meets 72% est needs by providing 1374kcal total  (646kcal dextrose, 428kcal AA, 300kcal avg lipids)     Wean as able with increased PO tolerance and diet advance.     Electronically signed by:  Mary Knott MS,RD,LD  10/11/22 11:25 EDT

## 2022-10-11 NOTE — PROGRESS NOTES
"Bariatric Surgery     LOS: 6 days   Patient Care Team:  Provider, No Known as PCP - General    Chief Complaint:  Post op    Subjective     Interval History:  Doing well.  No complaints.  Tolerating PO--has had almost 2 protein shakes today and has had less bloating and abdominal pain with Premier.  +Flatus and BM. Denies N/V.  No fevers.  Ambulating.  Voiding.  IS decreasing.  C/o cough.  CXR ordered this afternoon showed atelectasis.    Objective     Vital Signs  Blood pressure 128/76, pulse 82, temperature 99.8 °F (37.7 °C), temperature source Oral, resp. rate 16, height 174 cm (68.5\"), weight (!) 172 kg (379 lb 3.1 oz), SpO2 94 %.    Physical Exam:  General: Alert, NAD.  Up in chair.  Lungs: Clear b/l  Heart: RRR  Abdomen: soft, appropriate, incisions okay with jeison.  RIP is serous.  Normoactive bowel sounds.  Extremities: warm, (+) SCDs     Results Review:     I reviewed the patient's new clinical results.  I reviewed the patient's new imaging results and agree with the interpretation.    Labs:  Lab Results (last 24 hours)     Procedure Component Value Units Date/Time    Magnesium [[id number]]  (Normal) Collected: 10/11/22 0720    Specimen: Blood Updated: 10/11/22 0833     Magnesium 2.0 mg/dL     Comprehensive Metabolic Panel [[id number]]  (Abnormal) Collected: 10/11/22 0720    Specimen: Blood Updated: 10/11/22 0833     Glucose 201 mg/dL      BUN 11 mg/dL      Creatinine 0.69 mg/dL      Sodium 139 mmol/L      Potassium 4.6 mmol/L      Comment: Slight hemolysis detected by analyzer. Results may be affected.        Chloride 104 mmol/L      CO2 23.0 mmol/L      Calcium 8.9 mg/dL      Total Protein 5.8 g/dL      Albumin 3.20 g/dL      ALT (SGPT) 51 U/L      AST (SGOT) 30 U/L      Alkaline Phosphatase 56 U/L      Total Bilirubin 0.4 mg/dL      Globulin 2.6 gm/dL      Comment: Calculated Result        A/G Ratio 1.2 g/dL      BUN/Creatinine Ratio 15.9     Anion Gap 12.0 mmol/L      eGFR 110.0 mL/min/1.73      Comment: " "National Kidney Foundation and American Society of Nephrology (ASN) Task Force recommended calculation based on the Chronic Kidney Disease Epidemiology Collaboration (CKD-EPI) equation refit without adjustment for race.       Narrative:      GFR Normal >60  Chronic Kidney Disease <60  Kidney Failure <15      Phosphorus [[id number]]  (Abnormal) Collected: 10/11/22 0720    Specimen: Blood Updated: 10/11/22 0833     Phosphorus 2.4 mg/dL     Procalcitonin [[id number]]  (Normal) Collected: 10/11/22 0720    Specimen: Blood Updated: 10/11/22 0820     Procalcitonin 0.17 ng/mL     Narrative:      As a Marker for Sepsis (Non-Neonates):    1. <0.5 ng/mL represents a low risk of severe sepsis and/or septic shock.  2. >2 ng/mL represents a high risk of severe sepsis and/or septic shock.    As a Marker for Lower Respiratory Tract Infections that require antibiotic therapy:    PCT on Admission    Antibiotic Therapy       6-12 Hrs later    >0.5                Strongly Recommended  >0.25 - <0.5        Recommended   0.1 - 0.25          Discouraged              Remeasure/reassess PCT  <0.1                Strongly Discouraged     Remeasure/reassess PCT    As 28 day mortality risk marker: \"Change in Procalcitonin Result\" (>80% or <=80%) if Day 0 (or Day 1) and Day 4 values are available. Refer to http://www.[facility]-pct-calculator.com    Change in PCT <=80%  A decrease of PCT levels below or equal to 80% defines a positive change in PCT test result representing a higher risk for 28-day all-cause mortality of patients diagnosed with severe sepsis for septic shock.    Change in PCT >80%  A decrease of PCT levels of more than 80% defines a negative change in PCT result representing a lower risk for 28-day all-cause mortality of patients diagnosed with severe sepsis or septic shock.       Lactic Acid, Plasma [[id number]]  (Normal) Collected: 10/11/22 0720    Specimen: Blood Updated: 10/11/22 0815     Lactate 1.1 mmol/L      Comment: Falsely " depressed results may occur on samples drawn from patients receiving N-Acetylcysteine (NAC) or Metamizole.       Calcium, Ionized [[id number]]  (Normal) Collected: 10/11/22 0720    Specimen: Blood Updated: 10/11/22 0803     Ionized Calcium 1.31 mmol/L     CBC & Differential [[id number]]  (Abnormal) Collected: 10/11/22 0720    Specimen: Blood Updated: 10/11/22 0745    Narrative:      The following orders were created for panel order CBC & Differential.  Procedure                               Abnormality         Status                     ---------                               -----------         ------                     CBC Auto Differential[[id number]]        Abnormal            Final result                 Please view results for these tests on the individual orders.    CBC Auto Differential [[id number]]  (Abnormal) Collected: 10/11/22 0720    Specimen: Blood Updated: 10/11/22 0745     WBC 6.90 10*3/mm3      RBC 4.22 10*6/mm3      Hemoglobin 10.7 g/dL      Hematocrit 33.7 %      MCV 79.9 fL      MCH 25.4 pg      MCHC 31.8 g/dL      RDW 15.3 %      RDW-SD 44.0 fl      MPV 8.8 fL      Platelets 289 10*3/mm3      Neutrophil % 55.7 %      Lymphocyte % 24.9 %      Monocyte % 9.3 %      Eosinophil % 8.6 %      Basophil % 0.6 %      Immature Grans % 0.9 %      Neutrophils, Absolute 3.85 10*3/mm3      Lymphocytes, Absolute 1.72 10*3/mm3      Monocytes, Absolute 0.64 10*3/mm3      Eosinophils, Absolute 0.59 10*3/mm3      Basophils, Absolute 0.04 10*3/mm3      Immature Grans, Absolute 0.06 10*3/mm3      nRBC 0.0 /100 WBC     POC Glucose Once [[id number]]  (Abnormal) Collected: 10/11/22 0730    Specimen: Blood Updated: 10/11/22 0737     Glucose 189 mg/dL      Comment: Meter: [id number] : [id number] Steve Dinero       POC Glucose Once [[id number]]  (Abnormal) Collected: 10/10/22 1953    Specimen: Blood Updated: 10/10/22 1955     Glucose 166 mg/dL      Comment: Meter: [id number] : [id number] Mario Tamayo       POC  Glucose Once [[id number]]  (Abnormal) Collected: 10/10/22 1605    Specimen: Blood Updated: 10/10/22 1607     Glucose 145 mg/dL      Comment: Meter: [id number] : [id number] Kori Birch       POC Glucose Once [[id number]]  (Abnormal) Collected: 10/10/22 1128    Specimen: Blood Updated: 10/10/22 1130     Glucose 161 mg/dL      Comment: Meter: [id number] : [id number] Kori Birch       Prealbumin [[id number]]  (Abnormal) Collected: 10/10/22 0619    Specimen: Blood Updated: 10/10/22 0934     Prealbumin 10.7 mg/dL           Imaging:  Imaging Results (Last 24 Hours)     Procedure Component Value Units Date/Time    FL Upper GI Water Soluble [[id number]] Collected: 10/10/22 1540     Updated: 10/11/22 0842    Narrative:      EXAMINATION: FL UPPER GI WATER SOLUBLE-     INDICATION: s/p sleeve gastrectomy; K44.9-Diaphragmatic hernia without  obstruction or gangrene; Z68.43-Body mass index (BMI) 50.0-59.9, adult     TECHNIQUE: 1 minute and 12 seconds of fluoroscopic time was used for  this exam. 11 associated fluoroscopic series were saved.  imaging  reveals a nonobstructive bowel gas pattern. There are multiple surgical  clips visible throughout the upper abdomen. A RIP drain is visible across  the left and right upper quadrants. A suture line is visible in left  upper quadrant. Contrast is seen within the colon likely from previous  contrast enhanced study performed on 10/6/2022.     COMPARISON: NONE     FINDINGS: Under fluoroscopic observation, the patient ingested  water-soluble contrast. The oral phase of deglutition appeared normal.  There was no evidence of a focal esophageal stricture. Examination of  the stomach demonstrated postoperative changes that are consistent with  a vertical sleeve gastrectomy. There appears to be a small collection of  contrast at the leftward aspect of the gastroesophageal junction.  Collection of contrast may represent contrast within an edematous  gastric fold in the area of the  gastroesophageal junction, however a  small contained leak cannot be fully excluded at this time. No contrast  is appreciated in the RIP drain which is positioned immediately adjacent  to this small collection of contrast. The patient was taken directly to  the CT scanner for further imaging. Please correlate with CT imaging  which follows this study. There was normal passage of contrast through  the vertical sleeve distal to this area. There is no delay in gastric  emptying          Impression:      Status post vertical sleeve gastrectomy x5 days. There is a small  collection of contrast seen in the area of the gastroesophageal  junction, which may represent contrast within an edematous gastric fold,  however a small contained leak cannot be fully excluded at this time.  Please correlate with CT imaging which followed this study            This report was finalized on 10/11/2022 8:38 AM by Dr. Lit Mcgowan MD.       CT Abdomen Without Contrast [[id number]] Collected: 10/10/22 1014     Updated: 10/10/22 1030    Narrative:      DATE OF EXAM: 10/10/2022 9:53 AM     PROCEDURE: CT ABDOMEN WO CONTRAST-     INDICATIONS: Abdominal pain, post-op     COMPARISON: Water-soluble upper GI series of same date. Abdomen and  pelvis CT scan of 10/08/2022     TECHNIQUE: Routine transaxial slices were obtained through the abdomen  without the administration of intravenous contrast. Reconstructed  coronal and sagittal images were also obtained. Automated exposure  control and iterative construction methods were used.     The radiation dose reduction device was turned on for each scan per the  ALARA (As Low as Reasonably Achievable) protocol.     FINDINGS:  History indicates postop gastric sleeve with abdominal pain. Pooling of  contrast at the GE junction, equivocal for small contained leak.     The area seen on upper GI series appears to be present along the left  lateral margin of the GE junction on image numbers 22 through 25,  coronal  images 51 through 53. This small area of linear contrast with a  single air bubble appears to merge into normal-appearing contrast and a  prominent mucosal fold. The GE junction lumen is seen more medially, in  the upper edge of the left lateral contrast and air collection may  represent a tiny, well contained leak, particularly as it appears to be  quite marginal to the serosa, but may represent contrast trapped between  prominent mucosal folds instead. No definite evidence of leak is  identified here or elsewhere. There is only slight haziness of the  surrounding fat at this level and this is improved from the prior CT  scan.     Contrast passes normally through the remainder of the sleeve and into  normal caliber, normal-appearing small bowel.     Elsewhere, there is minimal dependent atelectasis of the posterior lower  lobes. There is diffuse fatty liver change. No hepatic lesions are  identified. Spleen is normal in size and appearance. Pancreas appears  normal. Adrenal glands appear normal. There are large bilateral upper  pole renal cysts. No upper abdominal free air or ascites is seen.  Diffuse intra-abdominal fat stranding and subcutaneous fat stranding is  typical for recent surgery. No inflammatory collection is seen here. RIP  drain remains in place, with no evidence of any surrounding collection.             Impression:         1. Subtle contrast collection at the GE junction, with a single air  bubble which extends up to the left lateral serosal margin, as  described. If this represents a minimal leak it is well contained, and  this may simply represent contrast and air within edematous mucosal  folds.  2. Generally expected postoperative changes of recent surgery elsewhere,  with intraabdominal and subcutaneous fat stranding. No evidence of  inflammatory collection, hematoma, free air or other acute disease.               Assessment & Plan     POD # 6 s/p lap converted to open sleeve  gastrectomy    Stable Hgb.  Doing well.  Labs reviewed.    Higher risk for delayed leak.  Will support nutrition, continue RIP.  Home today on cycled TPN/lipids, IV PPI and IV thiamine, and po abx (Augmentin + Diflucan) x 2 weeks.  Declined narcotic and antiemetic rx b/c he has minimal pain and no nausea.    F/u Friday for staple removal.           Soraya Downey MD  10/11/22  09:32 EDT

## 2022-10-11 NOTE — DISCHARGE INSTR - OTHER ORDERS
Please go to [facility]   10/18/2022 at 2:30 PM   FOR PICC LINE DRESSING CHANGE AND WEEKLY LABS

## 2022-10-11 NOTE — PROGRESS NOTES
[facility] Medicine Services  PROGRESS NOTE    Patient Name: Von Rodríguez  : 1965  MRN: [id number]    Date of Admission: 10/5/2022  Primary Care Physician: Provider, No Known    Subjective   Subjective   CC:  Med management post op sleeve     HPI:  Patient sitting up on the side of the bed in NAD stating that he is now swallowing without any pain and his gas is gone.  He is actually patient states that he will be going home today with TPN.  Patient is concerned about his cough, so CXR was done and it is no acute process.  Low-grade fever below 100 °F on and off.  No white count.    ROS:  Gen- No fevers, chills  CV- No chest pain, palpitations  Resp- No dyspnea; +cough  GI- No N/V/D; +appropriate surgical abd pain; +excessive belching; +severe pain when eating or drinking  All other systems have been reviewed and the pertinent positives and negatives are listed above in the HPI or ROS    Objective   Objective   Vital Signs:   Temp:  [98.3 °F (36.8 °C)-99.8 °F (37.7 °C)] 98.3 °F (36.8 °C)  Heart Rate:  [79-90] 79  Resp:  [16-18] 18  BP: (125-169)/() 125/83  Physical Exam:  Constitutional: Sleeping but easily arousable, very morbidly obese male sitting up in bed in NAD  Eyes: EOMI, sclerae anicteric, no conjunctival injection  Head: NCAT  ENT: [place], moist mucous membranes   Respiratory: Nonlabored, symmetrical chest expansion, CTAB but diminished throughout most likely d/t body habitus, RA  Cardiovascular: RRR, HR 92, no M/R/G, +DP pulses bilaterally  Gastrointestinal: Very morbidly obese, soft, +surgical tenderness, ND +BS; RIP drain in place draining light pink drainage  Musculoskeletal: MARQUEZ; no LE edema bilaterally; +RUE PICC  Neurologic: Oriented x4, strength symmetric in all extremities, follows all commands, speech clear  Skin: No rashes on exposed skin; laparoscopic incisions stapled but healing well  Psychiatric: Pleasant and cooperative; normal affect    Results  Reviewed:  LAB RESULTS:      Lab 10/11/22  0720 10/10/22  0619 10/09/22  1303 10/09/22  1156 10/09/22  0605 10/08/22  0550 10/07/22  0644   WBC 6.90 5.95  --   --  6.52 6.93 6.96   HEMOGLOBIN 10.7* 10.7*  --   --  11.1* 11.3* 11.7*   HEMATOCRIT 33.7* 33.3*  --   --  34.2* 36.8* 37.6   PLATELETS 289 257  --   --  248 213 204   NEUTROS ABS 3.85 3.26  --   --  3.70 3.88 4.54   IMMATURE GRANS (ABS) 0.06* 0.04  --   --  0.04 0.02 0.02   LYMPHS ABS 1.72 1.63  --   --  1.79 2.19 1.62   MONOS ABS 0.64 0.58  --   --  0.64 0.71 0.76   EOS ABS 0.59* 0.41*  --   --  0.32 0.10 0.01   MCV 79.9 79.1  --   --  79.2 81.8 81.7   CRP  --   --   --   --  13.13*  --   --    PROCALCITONIN 0.17 0.22  --  0.20  --   --   --    LACTATE 1.1 0.9 1.3  --   --   --   --          Lab 10/11/22  0720 10/10/22  0619 10/09/22  0605 10/08/22  0550 10/07/22  0644   SODIUM 139 139 137 136 138   POTASSIUM 4.6 4.0 4.4 4.5 4.4   CHLORIDE 104 103 103 103 104   CO2 23.0 23.0 21.0* 21.0* 23.0   ANION GAP 12.0 13.0 13.0 12.0 11.0   BUN 11 9 11 15 18   CREATININE 0.69* 0.72* 0.66* 0.95 0.79   EGFR 110.0 108.6 111.5 95.1 105.6   GLUCOSE 201* 158* 121* 126* 134*   CALCIUM 8.9 9.0 8.9 9.0 9.0   IONIZED CALCIUM 1.31 1.35*  --   --   --    MAGNESIUM 2.0 1.6 1.7  --   --    PHOSPHORUS 2.4* 2.8 2.0*  --   --          Lab 10/11/22  0720 10/10/22  0619 10/09/22  0605 10/08/22  0550 10/07/22  0644   TOTAL PROTEIN 5.8* 5.8* 6.3 6.6 6.1   ALBUMIN 3.20* 3.30* 3.20* 3.30* 3.50   GLOBULIN 2.6 2.5 3.1 3.3 2.6   ALT (SGPT) 51* 57* 58* 67* 88*   AST (SGOT) 30 36 42* 45* 83*   BILIRUBIN 0.4 0.6 0.6 0.5 0.4   ALK PHOS 56 57 58 54 53             Lab 10/09/22  0605   CHOLESTEROL 103   TRIGLYCERIDES 116         Lab 10/06/22  0725 10/05/22  1015   IRON 20*  --    ABO TYPING  --  AB   RH TYPING  --  Positive   ANTIBODY SCREEN  --  Negative         Brief Urine Lab Results     None          Microbiology Results Abnormal     None          CT Abdomen Without Contrast    Result Date:  10/10/2022  DATE OF EXAM: 10/10/2022 9:53 AM  PROCEDURE: CT ABDOMEN WO CONTRAST-  INDICATIONS: Abdominal pain, post-op  COMPARISON: Water-soluble upper GI series of same date. Abdomen and pelvis CT scan of 10/08/2022  TECHNIQUE: Routine transaxial slices were obtained through the abdomen without the administration of intravenous contrast. Reconstructed coronal and sagittal images were also obtained. Automated exposure control and iterative construction methods were used.  The radiation dose reduction device was turned on for each scan per the ALARA (As Low as Reasonably Achievable) protocol.  FINDINGS: History indicates postop gastric sleeve with abdominal pain. Pooling of contrast at the GE junction, equivocal for small contained leak.  The area seen on upper GI series appears to be present along the left lateral margin of the GE junction on image numbers 22 through 25, coronal images 51 through 53. This small area of linear contrast with a single air bubble appears to merge into normal-appearing contrast and a prominent mucosal fold. The GE junction lumen is seen more medially, in the upper edge of the left lateral contrast and air collection may represent a tiny, well contained leak, particularly as it appears to be quite marginal to the serosa, but may represent contrast trapped between prominent mucosal folds instead. No definite evidence of leak is identified here or elsewhere. There is only slight haziness of the surrounding fat at this level and this is improved from the prior CT scan.  Contrast passes normally through the remainder of the sleeve and into normal caliber, normal-appearing small bowel.  Elsewhere, there is minimal dependent atelectasis of the posterior lower lobes. There is diffuse fatty liver change. No hepatic lesions are identified. Spleen is normal in size and appearance. Pancreas appears normal. Adrenal glands appear normal. There are large bilateral upper pole renal cysts. No upper  abdominal free air or ascites is seen. Diffuse intra-abdominal fat stranding and subcutaneous fat stranding is typical for recent surgery. No inflammatory collection is seen here. RIP drain remains in place, with no evidence of any surrounding collection.        Impression:  1. Subtle contrast collection at the GE junction, with a single air bubble which extends up to the left lateral serosal margin, as described. If this represents a minimal leak it is well contained, and this may simply represent contrast and air within edematous mucosal folds. 2. Generally expected postoperative changes of recent surgery elsewhere, with intraabdominal and subcutaneous fat stranding. No evidence of inflammatory collection, hematoma, free air or other acute disease.      FL Upper GI Water Soluble    Result Date: 10/11/2022  EXAMINATION: FL UPPER GI WATER SOLUBLE-  INDICATION: s/p sleeve gastrectomy; K44.9-Diaphragmatic hernia without obstruction or gangrene; Z68.43-Body mass index (BMI) 50.0-59.9, adult  TECHNIQUE: 1 minute and 12 seconds of fluoroscopic time was used for this exam. 11 associated fluoroscopic series were saved.  imaging reveals a nonobstructive bowel gas pattern. There are multiple surgical clips visible throughout the upper abdomen. A RIP drain is visible across the left and right upper quadrants. A suture line is visible in left upper quadrant. Contrast is seen within the colon likely from previous contrast enhanced study performed on 10/6/2022.  COMPARISON: NONE  FINDINGS: Under fluoroscopic observation, the patient ingested water-soluble contrast. The oral phase of deglutition appeared normal. There was no evidence of a focal esophageal stricture. Examination of the stomach demonstrated postoperative changes that are consistent with a vertical sleeve gastrectomy. There appears to be a small collection of contrast at the leftward aspect of the gastroesophageal junction. Collection of contrast may represent  contrast within an edematous gastric fold in the area of the gastroesophageal junction, however a small contained leak cannot be fully excluded at this time. No contrast is appreciated in the RIP drain which is positioned immediately adjacent to this small collection of contrast. The patient was taken directly to the CT scanner for further imaging. Please correlate with CT imaging which follows this study. There was normal passage of contrast through the vertical sleeve distal to this area. There is no delay in gastric emptying       Impression: Status post vertical sleeve gastrectomy x5 days. There is a small collection of contrast seen in the area of the gastroesophageal junction, which may represent contrast within an edematous gastric fold, however a small contained leak cannot be fully excluded at this time. Please correlate with CT imaging which followed this study     This report was finalized on 10/11/2022 8:38 AM by Dr. Lit Mcgowan MD.            I have reviewed the medications:  Scheduled Meds:amLODIPine, 10 mg, Oral, Q24H  carvedilol, 12.5 mg, Oral, Q12H  dicyclomine, 20 mg, Intramuscular, 4x Daily  enoxaparin, 40 mg, Subcutaneous, Q12H  Fat Emul Fish Oil/Plant Based, 100 mL, Intravenous, Q24H (TPN)   And  Fat Emul Fish Oil/Plant Based, 100 mL, Intravenous, Q24H (TPN)  fluconazole, 400 mg, Intravenous, Daily  folic acid (FOLVITE) IVPB, 1 mg, Intravenous, Daily  insulin lispro, 0-9 Units, Subcutaneous, 4x Daily AC & at Bedtime  losartan, 100 mg, Oral, Q24H  pantoprazole, 40 mg, Intravenous, Q12H  piperacillin-tazobactam, 3.375 g, Intravenous, Q8H  rosuvastatin, 10 mg, Oral, Daily  simethicone, 80 mg, Oral, 4x Daily  sodium chloride, 10 mL, Intravenous, Q12H  thiamine (B-1) IV, 200 mg, Intravenous, TID      Continuous Infusions:Adult Central 2-in-1 TPN, , Last Rate: 80 mL/hr at 10/10/22 1804  lactated ringers, 150 mL/hr, Last Rate: 150 mL/hr (10/05/22 1613)  Pharmacy to Dose TPN,   sodium chloride 0.45 % with  KCl 20 mEq, 125 mL/hr, Last Rate: 125 mL/hr (10/11/22 0828)      PRN Meds:.•  acetaminophen  •  Albuterol Sulfate NEB Orderable  •  ALPRAZolam  •  aluminum-magnesium hydroxide-simethicone  •  benzonatate  •  diphenhydrAMINE  •  guaiFENesin-dextromethorphan  •  hydrALAZINE  •  HYDROmorphone **AND** naloxone  •  HYDROmorphone  •  LORazepam  •  LORazepam  •  magnesium sulfate **OR** magnesium sulfate **OR** magnesium sulfate  •  Morphine **AND** naloxone  •  naloxone  •  [] ondansetron **FOLLOWED BY** ondansetron  •  ondansetron  •  oxyCODONE  •  Pharmacy to Dose TPN  •  phenol  •  potassium phosphate infusion greater than 15 mMoles **OR** potassium phosphate infusion greater than 15 mMoles **OR** potassium phosphate **OR** sodium phosphate IVPB **OR** sodium phosphate IVPB **OR** sodium phosphate IVPB  •  prochlorperazine  •  promethazine  •  sodium chloride  •  sodium chloride    Assessment & Plan   Assessment & Plan     Active Hospital Problems    Diagnosis  POA   • **Morbid obesity with body mass index (BMI) of 50.0 to 59.9 in adult ([facility]) [E66.01, Z68.43]  Not Applicable   • Hypertension [I10]  Yes   • Other hyperlipidemia [E78.49]  Yes   • JAD (obstructive sleep apnea) [G47.33]  Yes   • Type 2 diabetes mellitus without complication, with long-term current use of insulin ([facility]) [E11.9, Z79.4]  Not Applicable   • GERD (gastroesophageal reflux disease) [K21.9]  Yes   • Benign paroxysmal positional vertigo [H81.10]  Yes   • Asthma [J45.909]  Yes      Resolved Hospital Problems    Diagnosis Date Resolved POA   • Hiatal hernia [K44.9] 10/05/2022 Unknown        Brief Hospital Course to date:  Von Rodríguez is a 56 y.o. male with a past medical history significant for hypertension, HLD, DM2, BPPV, GERD, asthma, JAD, and morbid obesity. Presents as a consult from Dr. Connelly. He is less than 24 hours s/p laparoscopic gastric sleeve and EGD. He is stable. Tolerated procedure well. Pain is controlled with PCA. Nurse is  reporting oxygenation does drop mildly while patient is sleeping. He has worn CPAP remotely but wishes to avoid it if possible. Reports he still feels groggy from anesthesia, but otherwise no complaints. No fever, cough, congestion, SOB, or chest pain. No headache or focal weakness/paratehesias. Blood pressure has been elevated slightly. Antihypertensives resumed by bariatric service. Will continue to follow for medical management    Morbid Obesity S/P Lap Gastric Sleeve  Fever, CT imaging with post-op focal fluid collection 4.6 cm  - S/p Gastric Sleeve ( Connelly) oin 10/5/22; low grade fever 99.0 on 10/10 and CT imaging with small focal fluid collection  - defer to bariatric surgery, TPN/PICC started 10/9  - currently on IV abx but will switch to PO augmentin; order in computer  - close monitor on telemetry  - having bowel fxn   --Added Bentyl for abdominal cramping and scheduled simethicone for gas  --PRN Maalox for gas  --GI consult for painful swallowing liquids/food and wanted to continue PPI and TPN at discharge     Hypertension  HLD  - currently stable  - resume Norvasc 10 mg, Coreg 12.5 mg, and cozaar 100 mg  - at time of discharge would likely hold on HCTZ component of hyzaar. Remainder of meds unchanged. Will write new Rx for losartan given he normally takes in combination form      DM2 w/A1c 6.2  - 24H glucose 159-201  - ok to resume home regimen and keep unchanged at discharge  --Consider SSI if glucoses increase d/t TPN     Asthma  JAD  - continue IS  - duo nebs with additional pulmonary toilet as needed- on RA during visit this AM  - encourage CPAP    Hospital medicine service will sign off at this time.  Hospital medicine med rec has been completed.    Thank you for allowing [facility] Medicine Service to provide consultative care for your patient, we will continue to follow while clinically appropriate.     Mandie Rae, APRN  10/11/22

## 2022-10-12 ENCOUNTER — READMISSION MANAGEMENT (OUTPATIENT)
Dept: CALL CENTER | Facility: HOSPITAL | Age: 57
End: 2022-10-12

## 2022-10-12 NOTE — OUTREACH NOTE
Prep Survey    Flowsheet Row Responses   [facility] patient discharged from? [facility]   Is LACE score < 7 ? No   Emergency Room discharge w/ pulse ox? No   Eligibility Readm Mgmt   Discharge diagnosis s/p laparoscopic converted to open vertical sleeve gastrectomy   Does the patient have one of the following disease processes/diagnoses(primary or secondary)? General Surgery   Does the patient have Home health ordered? No   Is there a DME ordered? Yes   What DME was ordered? TPN/Lipids- [facility]   Comments regarding appointments [facility] weekly for labs and picc dressing changes.   Prep survey completed? Yes          BRITTNEY SARAH - Registered Nurse

## 2022-10-13 ENCOUNTER — TELEPHONE (OUTPATIENT)
Dept: BARIATRICS/WEIGHT MGMT | Facility: CLINIC | Age: 57
End: 2022-10-13

## 2022-10-13 NOTE — TELEPHONE ENCOUNTER
India with [facility] called and stated that she is unable to get the Thiamine and Pantoprazole for the pt. She has none in stock and is unable to order it.     Called pt to ask what other local pharmacy he would prefer it be sent to. No answer, no VM box set up.

## 2022-10-14 ENCOUNTER — OFFICE VISIT (OUTPATIENT)
Dept: BARIATRICS/WEIGHT MGMT | Facility: CLINIC | Age: 57
End: 2022-10-14

## 2022-10-14 VITALS
DIASTOLIC BLOOD PRESSURE: 88 MMHG | OXYGEN SATURATION: 98 % | HEART RATE: 102 BPM | SYSTOLIC BLOOD PRESSURE: 148 MMHG | BODY MASS INDEX: 46.65 KG/M2 | TEMPERATURE: 98.7 F | RESPIRATION RATE: 18 BRPM | HEIGHT: 69 IN | WEIGHT: 315 LBS

## 2022-10-14 DIAGNOSIS — Z13.0 SCREENING, IRON DEFICIENCY ANEMIA: ICD-10-CM

## 2022-10-14 DIAGNOSIS — Z13.21 MALNUTRITION SCREEN: ICD-10-CM

## 2022-10-14 DIAGNOSIS — R53.83 FATIGUE, UNSPECIFIED TYPE: ICD-10-CM

## 2022-10-14 DIAGNOSIS — E55.9 HYPOVITAMINOSIS D: ICD-10-CM

## 2022-10-14 DIAGNOSIS — Z90.3 POSTGASTRECTOMY MALABSORPTION: ICD-10-CM

## 2022-10-14 DIAGNOSIS — K91.2 POSTGASTRECTOMY MALABSORPTION: ICD-10-CM

## 2022-10-14 PROCEDURE — 99024 POSTOP FOLLOW-UP VISIT: CPT | Performed by: SURGERY

## 2022-10-14 RX ORDER — BENZONATATE 200 MG/1
CAPSULE ORAL
COMMUNITY
Start: 2022-10-12 | End: 2022-10-14 | Stop reason: SDUPTHER

## 2022-10-14 RX ORDER — EPINEPHRINE 0.3 MG/.3ML
INJECTION SUBCUTANEOUS
COMMUNITY
Start: 2022-10-11

## 2022-10-14 NOTE — PROGRESS NOTES
[facility] Bariatric Surgery  [address]  [address]  [facility] [address] [phone]      Patient Name:  Von Rodríguez.  :  1965      Date of Visit: 10/14/2022      Reason for Visit:  POD #9    HPI:  Von Rodríguez is a 56 y.o. male s/p 10/5/22 Laparoscopic converted to open vertical sleeve gastrectomy for bleeding.    Sent home on TPn, lipids, thiamine, PPI, and oral abx for high risk of leak.    Did not do TPN, IV PPI, IV thiamine,  b/c he is weary being hooked up to IVs and thought he could do orally.  Only getting 60 g/day protein.    He is taking Augmentin/Diflucan.  Taking PPI orally.      Doing well.  No issues/concerns. Denies dysphagia, reflux, nausea, vomiting, abdominal pain, pulmonary issues and fevers.  Tolerating diet progression - on stage 2.  Getting 60 g prot/day.  Drinking 30 fluid oz/day.  Has vitamins ready to start.  On Omeprazole .  Holding ASA , NSAIDs  and Steroids.  Ambulating.     Presurgery weight: 381 pounds.  Today's weight is (!) 169 kg (373 lb) pounds, today's  Body mass index is 55.89 kg/m²., and his weight loss since surgery is 8 pounds.   Feels edematous but is starting to diurese.    RIP is 15 cc/day.    Path reviewed with patient:  Final Diagnosis   STOMACH, SUBTOTAL GASTRECTOMY:  Benign hyperplastic polyp  Oxyntic-type gastric mucosa with minimal chronic inactive inflammation  Negative for intestinal metaplasia, dysplasia and malignancy  Stomach wall within normal limits         Past Medical History:   Diagnosis Date   • Abnormal PFT     mild obstruction   • Asthma     childhood; does not have inhaler   • Back pain     chronic; no meds; no steroids   • Benign paroxysmal positional vertigo    • Candidal intertrigo    • Cornea transplant recipient     left only   • Elevated alanine aminotransferase (ALT) level    • Heartburn     EGD May 2021; no hx of h pylori; symptoms well controlled on omeprazole   • Helicobacter pylori ab+     took  prevpak 6/22   • Hiatal hernia with gastroesophageal reflux     EGD 5/21 Debra Tucker MD   • Hypertension    • Incisional hernia     s/p attempted lap RNY RUQ incision   • Microcytic erythrocytes    • JAD (obstructive sleep apnea)     s/p uvulaplasty   • Other hyperlipidemia    • S/P cholecystectomy 1993    lap; gallstones   • Type 2 diabetes mellitus without complication, with long-term current use of insulin ([facility])     dx in 2018; on insulin since diagnosis; last A1c 6.2   • Wears glasses      Past Surgical History:   Procedure Laterality Date   • COLONOSCOPY     • CORNEAL TRANSPLANT Left    • DIAGNOSTIC LAPAROSCOPY      aborted RNY gastric bypass due to high trocar torque. Dr. Gregory Pradhan [facility]. Complicated by incisional hernia   • ENDOSCOPY     • ENDOSCOPY N/A 10/5/2022    Procedure: ESOPHAGOGASTRODUODENOSCOPY;  Surgeon: Pete Connelly MD;  Location:  [facility];  Service: Bariatric;  Laterality: N/A;   • EXPLORATORY LAPAROTOMY N/A 10/5/2022    Procedure: LAPAROTOMY EXPLORATORY;  Surgeon: Pete Connelly MD;  Location:  [facility];  Service: Bariatric;  Laterality: N/A;   • GASTRIC SLEEVE LAPAROSCOPIC N/A 10/5/2022    Procedure: GASTRIC SLEEVE LAPAROSCOPIC;  Surgeon: Pete Connelly MD;  Location:  [facility];  Service: Bariatric;  Laterality: N/A;   • LAPAROSCOPIC CHOLECYSTECTOMY  1993    gallstones   • TONSILLECTOMY AND ADENOIDECTOMY  2007   • UVULOPLASTY      at time tonsils 2007 for JAD   • WISDOM TOOTH EXTRACTION      all removed     Outpatient Medications Marked as Taking for the 10/14/22 encounter (Office Visit) with Soraay Downey MD   Medication Sig Dispense Refill   • amLODIPine (NORVASC) 10 MG tablet Take 1 tablet by mouth Daily. 30 tablet 1   • amoxicillin-clavulanate (Augmentin) 875-125 MG per tablet Take 1 tablet by mouth 2 (Two) Times a Day for 14 days. 28 tablet 0   • B-D UF III MINI PEN NEEDLES 31G X 5 MM misc      • benzonatate (TESSALON) 100 MG capsule Take 1 capsule by mouth  "3 (Three) Times a Day As Needed for Cough.     • carvedilol (COREG) 12.5 MG tablet Take 1 tablet by mouth Every 12 (Twelve) Hours. 60 tablet 1   • Continuous Blood Gluc Sensor (FreeStyle Matt 14 Day Sensor) misc      • dicyclomine (Bentyl) 10 MG capsule Take 1 capsule by mouth 4 (Four) Times a Day As Needed (abdominal cramping). 120 capsule 0   • Enoxaparin Sodium (LOVENOX) 40 MG/0.4ML solution prefilled syringe syringe Inject 0.4 mL under the skin into the appropriate area as directed Every 12 (Twelve) Hours for 14 days. Indications: Prevention of Unwanted Clot in Veins 11.2 mL 0   • EPINEPHrine (EPIPEN) 0.3 MG/0.3ML solution auto-injector injection      • fluconazole (Diflucan) 150 MG tablet Take 1 tablet by mouth Daily for 14 days. 14 tablet 0   • guaiFENesin-dextromethorphan (ROBITUSSIN DM) 100-10 MG/5ML syrup Take 5 mL by mouth Every 4 (Four) Hours As Needed for Cough. 354 mL 1   • losartan (Cozaar) 100 MG tablet Take 1 tablet by mouth Daily. 30 tablet 0   • omeprazole (priLOSEC) 40 MG capsule Take 1 capsule by mouth Daily for 60 days. 60 capsule 0   • rosuvastatin (CRESTOR) 10 MG tablet 20 mg.     • simethicone (MYLICON) 80 MG chewable tablet Chew 1 tablet 4 (Four) Times a Day. 120 tablet 1   • thiamine (VITAMIN B1) 100 MG tablet Take 1 tablet by mouth Daily. 30 tablet 1   • TPN for discharge See most recent TPN order. 1 mL 14   • VITAMIN D PO Take  by mouth.       Allergies   Allergen Reactions   • Codeine Rash       Social History     Socioeconomic History   • Marital status:    Tobacco Use   • Smoking status: Never   • Smokeless tobacco: Never   Vaping Use   • Vaping Use: Never used   Substance and Sexual Activity   • Alcohol use: Not Currently   • Drug use: Never       /88 (BP Location: Left arm, Patient Position: Sitting)   Pulse 102   Temp 98.7 °F (37.1 °C)   Resp 18   Ht 174 cm (68.5\")   Wt (!) 169 kg (373 lb)   SpO2 98%   BMI 55.89 kg/m²   Physical Exam  Constitutional:       " General: He is not in acute distress.     Appearance: He is well-developed. He is not diaphoretic.   HENT:      Head: Normocephalic and atraumatic.      Mouth/Throat:      Pharynx: No oropharyngeal exudate.   Eyes:      Conjunctiva/sclera: Conjunctivae normal.      Pupils: Pupils are equal, round, and reactive to light.   Pulmonary:      Effort: Pulmonary effort is normal. No respiratory distress.   Abdominal:      General: There is no distension.      Palpations: Abdomen is soft.      Comments: All staples removed.  tranverse incision reinforced with steristrips.  RIP ss, removed without issues.       Skin:     General: Skin is warm and dry.      Coloration: Skin is not pale.      Comments: PICC, RUE without erythema, edema distally   Neurological:      Mental Status: He is alert and oriented to person, place, and time.      Cranial Nerves: No cranial nerve deficit.   Psychiatric:         Behavior: Behavior normal.         Thought Content: Thought content normal.           Assessment:   POD # 9      Plan:  Doing well. Continue to advance diet per manual.  Increase protein intake to 100g/day.   Increase exercise/activity as tolerated.  Reviewed lifting restrictions, nothing >25 lbs x 2 more weeks.  Continue vitamins.  Continue PPI.  Continue to avoid ASA/NSAIDs/Steroids x 6 weeks postop.  Call w/ problems/concerns.    The patient was instructed to follow up in 3 weeks, sooner if needed.     Arnold removed  Dc RIP  Keep PICC.  F/u in 1-2 weeks.  If does not need IVF, can dc.  If not keeping up with protein or fluids, run the cycled TPN.  Check routine labs today.  Pt counseled on risk of delayed gastric leak with malnutrition.  He plans to run the rest of the TPn.  Pt counseled on risk of dehydration.  Gave idea such as collagen and hourly drinking on improving hydration and nutrition.    Soraya Downey MD

## 2022-10-16 NOTE — DISCHARGE SUMMARY
"Date of admission: 10/5/2022     Date of discharge: 10/11/2022     A\Admission Diagnosis:   Super morbid Obesity with Multiple Co-morbidities status post attempted laparoscopic Zonia-en-Y gastric bypass at [facility] 8/3/2021 secondary to high trocar torque    Discharge Diagnosis:  Same    Principal Procedure Performed:   Laparoscopic converted to open vertical sleeve gastrectomy, EGD 10/5/2020    Other procedures performed: Upper GI 10/6/2022, CT scan abdomen pelvis 10/8/2022, upper GI/CT 10/10/2022, chest x-ray dated 10/11/2022, PICC line placement 10/10/2022    Complications:     Intraperitoneal bleeding requiring emergent conversion to open laparotomy    Consultations:   Hospitalist service, gastroenterology    History of present illness:  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  He presents for elective laparoscopic sleeve gastrectomy.  He has undergone our extensive preoperative education teaching and consent process everything is in order and he wishes to proceed.      Hospital Course:  The patient was admitted and underwent surgery as described.  Findings at surgery included:  Extremely technically challenging laparoscopic operation.  After placing the third staple line and attempting posterior exposure there was grasper injury to a large vein requiring emergent conversion to open laparotomy.  On entering the abdomen the bleeding had stopped and the exact etiology unclear and relatively minor amount of blood and clot in the abdomen.  Sleeve completed but extremely technically challenging open as well.  Counts correct but x-rays obtained as there was emergent conversion to open.  No visible hiatal hernia laparoscopically or endoscopically, Z-line 40 cm.  Operative time roughly 3 hours 20 minutes.  Postoperatively he was " transferred to the bariatric telemetry care unit and started on a PCA pump.  H&H slowly drifted without evidence of active bleeding and he did not require transfusion during this admission.  He did have iron deficiency and was given IV iron.  He was noted to be very hypertensive the evening of surgery and the hospitalist service was consulted.  Upper GI the following day was unremarkable.  The patient did complain of severe pain with liquids going down including epigastric pain.  RIP drainage was serosanguineous.  He did have elevated transaminases which slowly improved proved over the next few days.  Sliding scale insulin for his diabetes.  Obstructive sleep apnea untreated.  Hospitalists noted labile blood pressure.  His Lovenox VTE prophylaxis was continued throughout the admission.  He was hoping to go home on postop day #3 but on that day he said he did not feel well and his temperature was 100.8 that last evening.  He was passing flatus.  His main complaint was severe pain with oral intake.  The hospitalist nurse practitioner consulted GI for the symptoms without contacting or discussing with me.  CT scan performed with findings of a small focal fluid collection measuring 4.6 cm without rim enhancement adjacent to the distal greater curvature which may represent postoperative seroma or developing phlegmon/infection cannot be excluded.  I placed him on empiric antibiotics with Zosyn and Diflucan.  White count, procalcitonin and lactate were checked daily and normal.  He did have a low prealbumin at 11.3 and I felt he was at increased risk for delayed leak.  To minimize this risk in addition I ordered a PICC line, TPN and lipids.  IV thiamine given daily.  Upper GI repeated, no leak seen the radiologist elected to take the patient to the CAT scanner and once again no leak seen.  GI evaluated and had no further recommendations and signed off.  By postoperative day #5 he was starting to look and feel better.  He  was passing a lot more flatus and started to have some diarrhea he did not feel it was C. difficile.  No melena.  He was tolerating his diet without the abdominal pain and dysphagia, although somewhat suboptimal intake with only 2 protein shakes.  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  Since there were no signs of infection or leak plans were to switch him to oral Augmentin and Diflucan on discharge and continue his RIP drain after discharge as it appeared to be in a good position and may catch a delayed leak that would hopefully fistulas over time without requiring intervention.  On the day of discharge on postop day #6 he was doing well without complaints.  RIP drainage was serosanguineous and abdominal exam unremarkable.  Discharge CMP normal except for glucose of 201 creatinine 0.69 total protein 5.8 albumin 3.20 ALT 51.  Magnesium ionized calcium procalcitonin lactate normal phosphorus low at 2.4.  Discharge white count normal at 6.90 with an unremarkable differential except for 9% eosinophils.  Discharge H&H stable at 10.7 and 33.7.  He was discharged home on cycled TPN/lipids, IV PPI and IV thiamine, oral antibiotics with Augmentin and Diflucan for 2 more weeks.  He declined narcotic and antiemetic prescriptions because he has minimal pain and no nausea.  He is to follow-up at the end of the week for staple removal.  He is to call in the meantime with any problems questions or concerns.

## 2022-10-17 ENCOUNTER — TELEPHONE (OUTPATIENT)
Dept: BARIATRICS/WEIGHT MGMT | Facility: CLINIC | Age: 57
End: 2022-10-17

## 2022-10-17 NOTE — TELEPHONE ENCOUNTER
Infusion called and stated that pt has appt tomorrow with them and they need a PICC care order placed STAT. Please advise, thanks!

## 2022-10-18 ENCOUNTER — APPOINTMENT (OUTPATIENT)
Dept: ONCOLOGY | Facility: HOSPITAL | Age: 57
End: 2022-10-18

## 2022-10-18 DIAGNOSIS — E46 PROTEIN-CALORIE MALNUTRITION, UNSPECIFIED SEVERITY: Primary | ICD-10-CM

## 2022-10-19 ENCOUNTER — HOSPITAL ENCOUNTER (OUTPATIENT)
Dept: ONCOLOGY | Facility: HOSPITAL | Age: 57
Setting detail: INFUSION SERIES
Discharge: HOME | End: 2022-10-19

## 2022-10-19 ENCOUNTER — TELEPHONE (OUTPATIENT)
Dept: BARIATRICS/WEIGHT MGMT | Facility: CLINIC | Age: 57
End: 2022-10-19

## 2022-10-19 ENCOUNTER — READMISSION MANAGEMENT (OUTPATIENT)
Dept: CALL CENTER | Facility: HOSPITAL | Age: 57
End: 2022-10-19

## 2022-10-19 VITALS
WEIGHT: 315 LBS | HEART RATE: 120 BPM | SYSTOLIC BLOOD PRESSURE: 136 MMHG | HEIGHT: 69 IN | DIASTOLIC BLOOD PRESSURE: 93 MMHG | BODY MASS INDEX: 46.65 KG/M2 | RESPIRATION RATE: 16 BRPM | TEMPERATURE: 98 F

## 2022-10-19 DIAGNOSIS — E78.49 OTHER HYPERLIPIDEMIA: ICD-10-CM

## 2022-10-19 DIAGNOSIS — G47.33 OSA (OBSTRUCTIVE SLEEP APNEA): ICD-10-CM

## 2022-10-19 DIAGNOSIS — E11.9 TYPE 2 DIABETES MELLITUS WITHOUT COMPLICATION, WITH LONG-TERM CURRENT USE OF INSULIN: Primary | ICD-10-CM

## 2022-10-19 DIAGNOSIS — Z90.49 S/P CHOLECYSTECTOMY: ICD-10-CM

## 2022-10-19 DIAGNOSIS — Z79.4 TYPE 2 DIABETES MELLITUS WITHOUT COMPLICATION, WITH LONG-TERM CURRENT USE OF INSULIN: Primary | ICD-10-CM

## 2022-10-19 DIAGNOSIS — E66.01 MORBID OBESITY WITH BODY MASS INDEX (BMI) OF 50.0 TO 59.9 IN ADULT: ICD-10-CM

## 2022-10-19 DIAGNOSIS — H81.12 BENIGN PAROXYSMAL POSITIONAL VERTIGO OF LEFT EAR: ICD-10-CM

## 2022-10-19 LAB
ALBUMIN SERPL-MCNC: 3.8 G/DL (ref 3.5–5.2)
ALBUMIN/GLOB SERPL: 0.9 G/DL
ALP SERPL-CCNC: 56 U/L (ref 39–117)
ALT SERPL W P-5'-P-CCNC: 48 U/L (ref 1–41)
ANION GAP SERPL CALCULATED.3IONS-SCNC: 13 MMOL/L (ref 5–15)
AST SERPL-CCNC: 31 U/L (ref 1–40)
BASOPHILS # BLD AUTO: 0.04 10*3/MM3 (ref 0–0.2)
BASOPHILS NFR BLD AUTO: 0.7 % (ref 0–1.5)
BILIRUB SERPL-MCNC: 0.3 MG/DL (ref 0–1.2)
BUN SERPL-MCNC: 14 MG/DL (ref 6–20)
BUN/CREAT SERPL: 13.2 (ref 7–25)
CALCIUM SPEC-SCNC: 10.1 MG/DL (ref 8.6–10.5)
CHLORIDE SERPL-SCNC: 103 MMOL/L (ref 98–107)
CO2 SERPL-SCNC: 23 MMOL/L (ref 22–29)
CREAT SERPL-MCNC: 1.06 MG/DL (ref 0.76–1.27)
DEPRECATED RDW RBC AUTO: 43.7 FL (ref 37–54)
EGFRCR SERPLBLD CKD-EPI 2021: 83.4 ML/MIN/1.73
EOSINOPHIL # BLD AUTO: 0.42 10*3/MM3 (ref 0–0.4)
EOSINOPHIL NFR BLD AUTO: 7 % (ref 0.3–6.2)
ERYTHROCYTE [DISTWIDTH] IN BLOOD BY AUTOMATED COUNT: 14.8 % (ref 12.3–15.4)
FERRITIN SERPL-MCNC: 410.4 NG/ML (ref 30–400)
GLOBULIN UR ELPH-MCNC: 4.2 GM/DL
GLUCOSE SERPL-MCNC: 152 MG/DL (ref 65–99)
HCT VFR BLD AUTO: 38.7 % (ref 37.5–51)
HGB BLD-MCNC: 12.1 G/DL (ref 13–17.7)
IMM GRANULOCYTES # BLD AUTO: 0.02 10*3/MM3 (ref 0–0.05)
IMM GRANULOCYTES NFR BLD AUTO: 0.3 % (ref 0–0.5)
IRON 24H UR-MRATE: 22 MCG/DL (ref 59–158)
IRON SATN MFR SERPL: 7 % (ref 20–50)
LYMPHOCYTES # BLD AUTO: 1.77 10*3/MM3 (ref 0.7–3.1)
LYMPHOCYTES NFR BLD AUTO: 29.6 % (ref 19.6–45.3)
MAGNESIUM SERPL-MCNC: 1.9 MG/DL (ref 1.6–2.6)
MCH RBC QN AUTO: 25.2 PG (ref 26.6–33)
MCHC RBC AUTO-ENTMCNC: 31.3 G/DL (ref 31.5–35.7)
MCV RBC AUTO: 80.6 FL (ref 79–97)
MONOCYTES # BLD AUTO: 0.57 10*3/MM3 (ref 0.1–0.9)
MONOCYTES NFR BLD AUTO: 9.5 % (ref 5–12)
NEUTROPHILS NFR BLD AUTO: 3.16 10*3/MM3 (ref 1.7–7)
NEUTROPHILS NFR BLD AUTO: 52.9 % (ref 42.7–76)
NRBC BLD AUTO-RTO: 0 /100 WBC (ref 0–0.2)
PHOSPHATE SERPL-MCNC: 2.8 MG/DL (ref 2.5–4.5)
PLATELET # BLD AUTO: 415 10*3/MM3 (ref 140–450)
PMV BLD AUTO: 9 FL (ref 6–12)
POTASSIUM SERPL-SCNC: 4.4 MMOL/L (ref 3.5–5.2)
PROT SERPL-MCNC: 8 G/DL (ref 6–8.5)
RBC # BLD AUTO: 4.8 10*6/MM3 (ref 4.14–5.8)
SODIUM SERPL-SCNC: 139 MMOL/L (ref 136–145)
TIBC SERPL-MCNC: 320 MCG/DL (ref 298–536)
TRANSFERRIN SERPL-MCNC: 215 MG/DL (ref 200–360)
WBC NRBC COR # BLD: 5.98 10*3/MM3 (ref 3.4–10.8)

## 2022-10-19 PROCEDURE — 84134 ASSAY OF PREALBUMIN: CPT | Performed by: SURGERY

## 2022-10-19 PROCEDURE — 83540 ASSAY OF IRON: CPT | Performed by: SURGERY

## 2022-10-19 PROCEDURE — 80053 COMPREHEN METABOLIC PANEL: CPT

## 2022-10-19 PROCEDURE — 84425 ASSAY OF VITAMIN B-1: CPT | Performed by: SURGERY

## 2022-10-19 PROCEDURE — 84100 ASSAY OF PHOSPHORUS: CPT | Performed by: SURGERY

## 2022-10-19 PROCEDURE — 82746 ASSAY OF FOLIC ACID SERUM: CPT | Performed by: SURGERY

## 2022-10-19 PROCEDURE — 83735 ASSAY OF MAGNESIUM: CPT | Performed by: SURGERY

## 2022-10-19 PROCEDURE — 82728 ASSAY OF FERRITIN: CPT | Performed by: SURGERY

## 2022-10-19 PROCEDURE — 85025 COMPLETE CBC W/AUTO DIFF WBC: CPT | Performed by: SURGERY

## 2022-10-19 PROCEDURE — 83921 ORGANIC ACID SINGLE QUANT: CPT | Performed by: SURGERY

## 2022-10-19 PROCEDURE — G0463 HOSPITAL OUTPT CLINIC VISIT: HCPCS

## 2022-10-19 PROCEDURE — 84466 ASSAY OF TRANSFERRIN: CPT | Performed by: SURGERY

## 2022-10-19 PROCEDURE — 82306 VITAMIN D 25 HYDROXY: CPT

## 2022-10-19 NOTE — ADDENDUM NOTE
Encounter addended by: Yelitza Macario on: 10/19/2022 5:25 PM   Actions taken: Visit diagnoses modified, Order list changed, Diagnosis association updated, Child order released for a procedure order

## 2022-10-19 NOTE — OUTREACH NOTE
General Surgery Week 2 Survey    Flowsheet Row Responses   [facility] facility patient discharged from? [place]   Does the patient have one of the following disease processes/diagnoses(primary or secondary)? General Surgery   Week 2 attempt successful? No   Unsuccessful attempts Attempt 1          YENIFER MCGILL - Registered Nurse

## 2022-10-19 NOTE — TELEPHONE ENCOUNTER
Carol called with  [facility] and stated that pt has not been compliant with TPN. He got a new bag last Tuesday 10/11, they went to check on him this past Monday 10/17 and pt stated that he did not take the TPN, has quite a bit in stock at his home he has not used, and stated that when visiting other providers he told them he does not need it.     She wanted to make you aware that she is not comfortable supplying him with more TPN at this time due to noncompliance.

## 2022-10-20 LAB
25(OH)D3 SERPL-MCNC: 64 NG/ML (ref 30–100)
FOLATE SERPL-MCNC: 18.2 NG/ML (ref 4.78–24.2)
PREALB SERPL-MCNC: 17.6 MG/DL (ref 20–40)

## 2022-10-21 LAB
25(OH)D3+25(OH)D2 SERPL-MCNC: 57.3 NG/ML (ref 30–100)
ALBUMIN SERPL-MCNC: 3.5 G/DL (ref 3.8–4.9)
ALBUMIN/GLOB SERPL: 1.1 {RATIO} (ref 1.2–2.2)
ALP SERPL-CCNC: 58 IU/L (ref 44–121)
ALT SERPL-CCNC: 57 IU/L (ref 0–44)
AST SERPL-CCNC: 36 IU/L (ref 0–40)
BASOPHILS # BLD AUTO: 0 X10E3/UL (ref 0–0.2)
BASOPHILS NFR BLD AUTO: 1 %
BILIRUB SERPL-MCNC: 0.4 MG/DL (ref 0–1.2)
BUN SERPL-MCNC: 15 MG/DL (ref 6–24)
BUN/CREAT SERPL: 13 (ref 9–20)
CALCIUM SERPL-MCNC: 9.7 MG/DL (ref 8.7–10.2)
CHLORIDE SERPL-SCNC: 100 MMOL/L (ref 96–106)
CO2 SERPL-SCNC: 22 MMOL/L (ref 20–29)
CREAT SERPL-MCNC: 1.14 MG/DL (ref 0.76–1.27)
EGFRCR SERPLBLD CKD-EPI 2021: 76 ML/MIN/1.73
EOSINOPHIL # BLD AUTO: 0.5 X10E3/UL (ref 0–0.4)
EOSINOPHIL NFR BLD AUTO: 8 %
ERYTHROCYTE [DISTWIDTH] IN BLOOD BY AUTOMATED COUNT: 14.3 % (ref 11.6–15.4)
FERRITIN SERPL-MCNC: 418 NG/ML (ref 30–400)
FOLATE SERPL-MCNC: >20 NG/ML
GLOBULIN SER CALC-MCNC: 3.2 G/DL (ref 1.5–4.5)
GLUCOSE SERPL-MCNC: 141 MG/DL (ref 70–99)
HCT VFR BLD AUTO: 38.2 % (ref 37.5–51)
HGB BLD-MCNC: 12.4 G/DL (ref 13–17.7)
IMM GRANULOCYTES # BLD AUTO: 0.1 X10E3/UL (ref 0–0.1)
IMM GRANULOCYTES NFR BLD AUTO: 1 %
IRON SERPL-MCNC: 20 UG/DL (ref 38–169)
LYMPHOCYTES # BLD AUTO: 1.8 X10E3/UL (ref 0.7–3.1)
LYMPHOCYTES NFR BLD AUTO: 27 %
MAGNESIUM SERPL-MCNC: 1.9 MG/DL (ref 1.6–2.3)
MCH RBC QN AUTO: 25.7 PG (ref 26.6–33)
MCHC RBC AUTO-ENTMCNC: 32.5 G/DL (ref 31.5–35.7)
MCV RBC AUTO: 79 FL (ref 79–97)
METHYLMALONATE SERPL-SCNC: 170 NMOL/L (ref 0–378)
MONOCYTES # BLD AUTO: 0.7 X10E3/UL (ref 0.1–0.9)
MONOCYTES NFR BLD AUTO: 10 %
NEUTROPHILS # BLD AUTO: 3.7 X10E3/UL (ref 1.4–7)
NEUTROPHILS NFR BLD AUTO: 53 %
PHOSPHATE SERPL-MCNC: 2.8 MG/DL (ref 2.8–4.1)
PLATELET # BLD AUTO: 382 X10E3/UL (ref 150–450)
POTASSIUM SERPL-SCNC: 4.8 MMOL/L (ref 3.5–5.2)
PREALB SERPL-MCNC: 14 MG/DL (ref 10–36)
PROT SERPL-MCNC: 6.7 G/DL (ref 6–8.5)
RBC # BLD AUTO: 4.82 X10E6/UL (ref 4.14–5.8)
SODIUM SERPL-SCNC: 140 MMOL/L (ref 134–144)
VIT B1 BLD-SCNC: 205 NMOL/L (ref 66.5–200)
VIT B1 BLD-SCNC: 250 NMOL/L (ref 66.5–200)
WBC # BLD AUTO: 6.8 X10E3/UL (ref 3.4–10.8)

## 2022-10-24 LAB — METHYLMALONATE SERPL-SCNC: 199 NMOL/L (ref 0–378)

## 2022-10-25 ENCOUNTER — TELEPHONE (OUTPATIENT)
Dept: BARIATRICS/WEIGHT MGMT | Facility: CLINIC | Age: 57
End: 2022-10-25

## 2022-10-25 ENCOUNTER — READMISSION MANAGEMENT (OUTPATIENT)
Dept: CALL CENTER | Facility: HOSPITAL | Age: 57
End: 2022-10-25

## 2022-10-25 DIAGNOSIS — E46 PROTEIN-CALORIE MALNUTRITION, UNSPECIFIED SEVERITY: Primary | ICD-10-CM

## 2022-10-25 NOTE — TELEPHONE ENCOUNTER
Debra in infusion called, she stated that she needs an order for PICC line dressing change. Please advise, thanks!

## 2022-10-25 NOTE — OUTREACH NOTE
General Surgery Week 2 Survey    Flowsheet Row Responses   [facility] patient discharged from? [place]   Does the patient have one of the following disease processes/diagnoses(primary or secondary)? General Surgery   Week 2 attempt successful? Yes   Call start time 1109   Call end time 1111   Discharge diagnosis s/p laparoscopic converted to open vertical sleeve gastrectomy   Meds reviewed with patient/caregiver? Yes   Is the patient having any side effects they believe may be caused by any medication additions or changes? No   Does the patient have all medications related to this admission filled (includes all antibiotics, pain medications, etc.) Yes   Is the patient taking all medications as directed (includes completed medication regime)? Yes   Does the patient have a follow up appointment scheduled with their surgeon? Yes   Has the patient kept scheduled appointments due by today? Yes   Comments FU with surgeon Dr. Downey 10/28/22   What DME was ordered? TPN/Lipids- [facility]   Psychosocial issues? No   Did the patient receive a copy of their discharge instructions? Yes   Nursing interventions Reviewed instructions with patient   What is the patient's perception of their health status since discharge? Improving   Nursing interventions Nurse provided patient education   Is the patient /caregiver able to teach back basic post-op care? Continue use of incentive spirometry at least 1 week post discharge, Drive as instructed by MD in discharge instructions, Keep incision areas clean,dry and protected, Take showers only when approved by MD-sponge bathe until then, Lifting as instructed by MD in discharge instructions   Is the patient/caregiver able to teach back signs and symptoms of incisional infection? Increased redness, swelling or pain at the incisonal site, Increased drainage or bleeding, Incisional warmth, Pus or odor from incision, Fever   Is the patient/caregiver able to teach back steps to recovery at  home? Set small, achievable goals for return to baseline health, Rest and rebuild strength, gradually increase activity, Eat a well-balance diet   If the patient is a current smoker, are they able to teach back resources for cessation? Not a smoker   Is the patient/caregiver able to teach back the hierarchy of who to call/visit for symptoms/problems? PCP, Specialist, Home health nurse, Urgent Care, ED, 911 Yes   Week 2 call completed? Yes          DAHLIA MARQUEZ - Registered Nurse

## 2022-10-26 ENCOUNTER — HOSPITAL ENCOUNTER (OUTPATIENT)
Dept: ONCOLOGY | Facility: HOSPITAL | Age: 57
Setting detail: INFUSION SERIES
Discharge: HOME | End: 2022-10-26

## 2022-10-27 ENCOUNTER — OFFICE VISIT (OUTPATIENT)
Dept: BARIATRICS/WEIGHT MGMT | Facility: CLINIC | Age: 57
End: 2022-10-27

## 2022-10-27 ENCOUNTER — HOSPITAL ENCOUNTER (OUTPATIENT)
Dept: ONCOLOGY | Facility: HOSPITAL | Age: 57
Setting detail: INFUSION SERIES
Discharge: HOME | End: 2022-10-27

## 2022-10-27 VITALS
DIASTOLIC BLOOD PRESSURE: 84 MMHG | TEMPERATURE: 97.5 F | SYSTOLIC BLOOD PRESSURE: 130 MMHG | WEIGHT: 315 LBS | HEIGHT: 69 IN | HEART RATE: 92 BPM | OXYGEN SATURATION: 98 % | BODY MASS INDEX: 46.65 KG/M2 | RESPIRATION RATE: 18 BRPM

## 2022-10-27 DIAGNOSIS — R10.13 DYSPEPSIA: ICD-10-CM

## 2022-10-27 DIAGNOSIS — Z13.21 MALNUTRITION SCREEN: ICD-10-CM

## 2022-10-27 DIAGNOSIS — R12 HEARTBURN: ICD-10-CM

## 2022-10-27 DIAGNOSIS — E46 PROTEIN-CALORIE MALNUTRITION, UNSPECIFIED SEVERITY: ICD-10-CM

## 2022-10-27 DIAGNOSIS — E66.01 MORBID OBESITY WITH BODY MASS INDEX (BMI) OF 50.0 TO 59.9 IN ADULT: ICD-10-CM

## 2022-10-27 DIAGNOSIS — R53.83 FATIGUE, UNSPECIFIED TYPE: Primary | ICD-10-CM

## 2022-10-27 PROCEDURE — G0463 HOSPITAL OUTPT CLINIC VISIT: HCPCS

## 2022-10-27 PROCEDURE — 99024 POSTOP FOLLOW-UP VISIT: CPT | Performed by: PHYSICIAN ASSISTANT

## 2022-10-27 RX ORDER — SODIUM CHLORIDE 0.9 % (FLUSH) 0.9 %
20 SYRINGE (ML) INJECTION AS NEEDED
Status: CANCELLED | OUTPATIENT
Start: 2022-10-27

## 2022-10-27 NOTE — PROGRESS NOTES
"[facility]ariat[facility]  [address]  [place] [address]  formerly [facility] [id number] [phone]      Patient Name:  Von Rodríguez  :  1965      Date of Visit: 10/27/2022      Reason for Visit:  POD #22    HPI:  Von Rodríguez is a 56 y.o. male s/p 10/5/22 Laparoscopic converted to open vertical sleeve gastrectomy for bleeding.    LOV 10/14/22 with Dr. Downey:     Patient was not doing TPN, IV PPI or IV thiamine because he was wary about being hooked up to diabetes.  He was only getting 60 g of protein per day at that visit.  He was taking his Augmentin and Diflucan.        Patient reports he is still struggling with p.o. intake.  He has been doing TPN and \"would be struggling\" without it.  He says anytime he takes more than one or two ounces at a time he will experience epigastric pain, belching, and nausea.  Having a difficult time because he has little to no appetite.  The only thing he has found he has been able to tolerate is yogurt-able to eat an entire single serve container in one setting.  While I was in the room with him, he had a cup of water and consumed 4-5 ounces during the time of our visit, which was about 15-20 minutes.  I asked how he felt after finishing his water, and he denied any pain and said that was the best he had done since surgery.  He then told me he does fine with water and yogurt, but anything else he cannot tolerate more than a couple of bites.  Denies dysphagia, reflux, vomiting, pulmonary issues and fevers.  Tolerating diet progression - on stage 3.  Getting 15-20g prot/day by mouth. Drinking maybe 25 fl oz per day.  Started Patchaid MVI, B12+, Vit D.  On Omeprazole .  Holding ASA , NSAIDs , Tramadol, Hormones, Diuretics , Steroids and Immunologics.  Ambulating.     Presurgery weight: 381 pounds.  Today's weight is (!) 162 kg (358 lb) pounds, today's  Body mass index is 53.64 kg/m²., and weight loss since surgery is 23 pounds.       Past Medical " History:   Diagnosis Date   • Abnormal PFT     mild obstruction   • Asthma     childhood; does not have inhaler   • Back pain     chronic; no meds; no steroids   • Benign paroxysmal positional vertigo    • Candidal intertrigo    • Cornea transplant recipient     left only   • Elevated alanine aminotransferase (ALT) level    • Heartburn     EGD May 2021; no hx of h pylori; symptoms well controlled on omeprazole   • Helicobacter pylori ab+     took prevpak 6/22   • Hiatal hernia with gastroesophageal reflux     EGD 5/21 Debra Tucker MD   • Hypertension    • Incisional hernia     s/p attempted lap RNY RUQ incision   • Microcytic erythrocytes    • JAD (obstructive sleep apnea)     s/p uvulaplasty   • Other hyperlipidemia    • S/P cholecystectomy 1993    lap; gallstones   • Type 2 diabetes mellitus without complication, with long-term current use of insulin (HCC)     dx in 2018; on insulin since diagnosis; last A1c 6.2   • Wears glasses      Past Surgical History:   Procedure Laterality Date   • COLONOSCOPY     • CORNEAL TRANSPLANT Left    • DIAGNOSTIC LAPAROSCOPY      aborted RNY gastric bypass due to high trocar torque. Dr. Gregory Pradhan [facility]. Complicated by incisional hernia   • ENDOSCOPY     • ENDOSCOPY N/A 10/5/2022    Procedure: ESOPHAGOGASTRODUODENOSCOPY;  Surgeon: Pete Connelly MD;  Location:  [facility];  Service: Bariatric;  Laterality: N/A;   • EXPLORATORY LAPAROTOMY N/A 10/5/2022    Procedure: LAPAROTOMY EXPLORATORY;  Surgeon: Pete Connelly MD;  Location:  [facility];  Service: Bariatric;  Laterality: N/A;   • GASTRIC SLEEVE LAPAROSCOPIC N/A 10/5/2022    Procedure: GASTRIC SLEEVE LAPAROSCOPIC;  Surgeon: Pete Connelly MD;  Location:  [facility];  Service: Bariatric;  Laterality: N/A;   • LAPAROSCOPIC CHOLECYSTECTOMY  1993    gallstones   • TONSILLECTOMY AND ADENOIDECTOMY  2007   • UVULOPLASTY      at time tonsils 2007 for JAD   • WISDOM TOOTH EXTRACTION      all removed     Outpatient  Medications Marked as Taking for the 10/27/22 encounter (Office Visit) with Mayi Aguilar PA   Medication Sig Dispense Refill   • albuterol (PROVENTIL) (2.5 MG/3ML) 0.083% nebulizer solution Take 2.5 mg by nebulization Every 4 (Four) Hours As Needed for Shortness of Air.  12   • amLODIPine (NORVASC) 10 MG tablet Take 1 tablet by mouth Daily. 30 tablet 1   • B-D UF III MINI PEN NEEDLES 31G X 5 MM misc      • carvedilol (COREG) 12.5 MG tablet Take 1 tablet by mouth Every 12 (Twelve) Hours. 60 tablet 1   • Continuous Blood Gluc Sensor (FreeStyle Matt 14 Day Sensor) misc      • dicyclomine (Bentyl) 10 MG capsule Take 1 capsule by mouth 4 (Four) Times a Day As Needed (abdominal cramping). 120 capsule 0   • EPINEPHrine (EPIPEN) 0.3 MG/0.3ML solution auto-injector injection      • HumaLOG KwikPen 200 UNIT/ML solution pen-injector      • Insulin Glargine (Lantus SoloStar) 100 UNIT/ML injection pen Every 12 (Twelve) Hours.     • losartan (Cozaar) 100 MG tablet Take 1 tablet by mouth Daily. 30 tablet 0   • omeprazole (priLOSEC) 40 MG capsule Take 1 capsule by mouth Daily for 60 days. 60 capsule 0   • ondansetron (ZOFRAN) 4 MG tablet Take 1 tablet by mouth Every 6 (Six) Hours As Needed for Nausea or Vomiting. 20 tablet 0   • rosuvastatin (CRESTOR) 10 MG tablet 20 mg.     • simethicone (MYLICON) 80 MG chewable tablet Chew 1 tablet 4 (Four) Times a Day. 120 tablet 1   • thiamine (VITAMIN B1) 100 MG tablet Take 1 tablet by mouth Daily. 30 tablet 1   • TPN for discharge See most recent TPN order. 1 mL 14   • VITAMIN D PO Take  by mouth.       Allergies   Allergen Reactions   • Codeine Rash       Social History     Socioeconomic History   • Marital status:    Tobacco Use   • Smoking status: Never   • Smokeless tobacco: Never   Vaping Use   • Vaping Use: Never used   Substance and Sexual Activity   • Alcohol use: Not Currently   • Drug use: Never     Social History     Social History Narrative    Lives in [place], [place].  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 .        /84 (BP Location: Left arm, Patient Position: Sitting)   Pulse 92   Temp 97.5 °F (36.4 °C)   Resp 18   Ht 174 cm (68.5\")   Wt (!) 162 kg (358 lb)   SpO2 98%   BMI 53.64 kg/m²   Physical Exam  Constitutional:       General: He is not in acute distress.     Appearance: He is obese.   Cardiovascular:      Rate and Rhythm: Normal rate and regular rhythm.   Pulmonary:      Effort: Pulmonary effort is normal.      Breath sounds: Normal breath sounds.   Abdominal:      General: Bowel sounds are normal. There is no distension.      Palpations: Abdomen is soft. There is no mass.      Tenderness: There is no abdominal tenderness.      Comments: Transverse incision and lap incisions have healed well.  RIP drain site healing well with no erythema, induration, fluctuance, drainage   Skin:     General: Skin is warm and dry.   Neurological:      General: No focal deficit present.      Mental Status: He is alert and oriented to person, place, and time.   Psychiatric:         Mood and Affect: Mood normal.         Behavior: Behavior normal.           Assessment:   POD #22    Class 3 Severe Obesity (BMI >=40). Obesity-related health conditions include the following: As above. Obesity is improving with treatment. BMI is is above average; BMI management plan is completed. We discussed low calorie, low carb based diet program, portion control and increasing exercise.      Plan:  Will continue TPN for now. Will check labs today and he is planning to go to [facility] for dressing change after this.  We had a long discussion about the importance of increasing his p.o. intake as TPN is not a great long-term solution.  I do suspect the sudden change in his habits and decrease in his appetite could be playing a role.  I am also going to order a water-soluble upper GI to rule out any surgical issue.  Continue to advance diet per manual.  Increase protein 100g/day.  Increase " exercise/activity as tolerated.  Reviewed lifting restrictions, nothing >10 lbs x 3 more weeks.  Continue vitamin patches-advised to start iron patch based on last labs.  Continue PPI.  Continue to avoid ASA/NSAIDs/tobacco x 6 weeks postop, steroids x 8 weeks postop.  Call w/ problems/concerns.    The patient was instructed to follow up in 1-2 weeks, sooner if needed.     Addendum:    Discussed with Dr. Connelly who does agree with obtaining upper GI.  He would also like to increase PPI to twice daily dosing and add Carafate slurry 1 g 4 times daily.      Addendum:     Patient is only able to get 15-20 g of protein per day and less than 30 ounces of fluids.  His prealbumin has improved with the TPN, but he would not be able to sustain without relying on this for nutrition.  We will need to continue TPN at this time until he is able to get adequate p.o. intake.  We will follow-up with him every 1 to 2 weeks and plan to recheck his prealbumin and follow-up with his p.o. intake.

## 2022-10-28 LAB
ALBUMIN SERPL-MCNC: 4.1 G/DL (ref 3.8–4.9)
ALBUMIN/GLOB SERPL: 1.2 {RATIO} (ref 1.2–2.2)
ALP SERPL-CCNC: 67 IU/L (ref 44–121)
ALT SERPL-CCNC: 55 IU/L (ref 0–44)
AST SERPL-CCNC: 25 IU/L (ref 0–40)
BASOPHILS # BLD AUTO: 0 X10E3/UL (ref 0–0.2)
BASOPHILS NFR BLD AUTO: 1 %
BILIRUB SERPL-MCNC: 0.3 MG/DL (ref 0–1.2)
BUN SERPL-MCNC: 22 MG/DL (ref 6–24)
BUN/CREAT SERPL: 21 (ref 9–20)
CALCIUM SERPL-MCNC: 10.3 MG/DL (ref 8.7–10.2)
CHLORIDE SERPL-SCNC: 102 MMOL/L (ref 96–106)
CO2 SERPL-SCNC: 20 MMOL/L (ref 20–29)
CREAT SERPL-MCNC: 1.05 MG/DL (ref 0.76–1.27)
EGFRCR SERPLBLD CKD-EPI 2021: 84 ML/MIN/1.73
EOSINOPHIL # BLD AUTO: 0.5 X10E3/UL (ref 0–0.4)
EOSINOPHIL NFR BLD AUTO: 9 %
ERYTHROCYTE [DISTWIDTH] IN BLOOD BY AUTOMATED COUNT: 14.2 % (ref 11.6–15.4)
GLOBULIN SER CALC-MCNC: 3.3 G/DL (ref 1.5–4.5)
GLUCOSE SERPL-MCNC: 188 MG/DL (ref 70–99)
HCT VFR BLD AUTO: 40.4 % (ref 37.5–51)
HGB BLD-MCNC: 13 G/DL (ref 13–17.7)
IMM GRANULOCYTES # BLD AUTO: 0 X10E3/UL (ref 0–0.1)
IMM GRANULOCYTES NFR BLD AUTO: 0 %
LYMPHOCYTES # BLD AUTO: 2.1 X10E3/UL (ref 0.7–3.1)
LYMPHOCYTES NFR BLD AUTO: 41 %
MAGNESIUM SERPL-MCNC: 1.8 MG/DL (ref 1.6–2.3)
MCH RBC QN AUTO: 25.3 PG (ref 26.6–33)
MCHC RBC AUTO-ENTMCNC: 32.2 G/DL (ref 31.5–35.7)
MCV RBC AUTO: 79 FL (ref 79–97)
MONOCYTES # BLD AUTO: 0.6 X10E3/UL (ref 0.1–0.9)
MONOCYTES NFR BLD AUTO: 11 %
NEUTROPHILS # BLD AUTO: 1.9 X10E3/UL (ref 1.4–7)
NEUTROPHILS NFR BLD AUTO: 38 %
PHOSPHATE SERPL-MCNC: 3.1 MG/DL (ref 2.8–4.1)
PLATELET # BLD AUTO: 320 X10E3/UL (ref 150–450)
POTASSIUM SERPL-SCNC: 4.9 MMOL/L (ref 3.5–5.2)
PREALB SERPL-MCNC: 26 MG/DL (ref 10–36)
PROT SERPL-MCNC: 7.4 G/DL (ref 6–8.5)
RBC # BLD AUTO: 5.13 X10E6/UL (ref 4.14–5.8)
SODIUM SERPL-SCNC: 139 MMOL/L (ref 134–144)
WBC # BLD AUTO: 5.1 X10E3/UL (ref 3.4–10.8)

## 2022-10-28 RX ORDER — OMEPRAZOLE 40 MG/1
40 CAPSULE, DELAYED RELEASE ORAL 2 TIMES DAILY
Qty: 180 CAPSULE | Refills: 0 | Status: SHIPPED | OUTPATIENT
Start: 2022-10-28 | End: 2023-01-26

## 2022-10-28 RX ORDER — SUCRALFATE ORAL 1 G/10ML
1 SUSPENSION ORAL 4 TIMES DAILY
Qty: 1200 ML | Refills: 0 | Status: SHIPPED | OUTPATIENT
Start: 2022-10-28 | End: 2022-11-27

## 2022-11-03 ENCOUNTER — HOSPITAL ENCOUNTER (OUTPATIENT)
Dept: GENERAL RADIOLOGY | Facility: HOSPITAL | Age: 57
Discharge: HOME | End: 2022-11-03
Admitting: PHYSICIAN ASSISTANT

## 2022-11-03 ENCOUNTER — HOSPITAL ENCOUNTER (OUTPATIENT)
Dept: ONCOLOGY | Facility: HOSPITAL | Age: 57
Setting detail: INFUSION SERIES
Discharge: HOME | End: 2022-11-03

## 2022-11-03 VITALS
DIASTOLIC BLOOD PRESSURE: 98 MMHG | TEMPERATURE: 97.3 F | SYSTOLIC BLOOD PRESSURE: 156 MMHG | BODY MASS INDEX: 46.65 KG/M2 | HEIGHT: 69 IN | WEIGHT: 315 LBS | RESPIRATION RATE: 16 BRPM | HEART RATE: 107 BPM

## 2022-11-03 DIAGNOSIS — E46 PROTEIN-CALORIE MALNUTRITION, UNSPECIFIED SEVERITY: Primary | ICD-10-CM

## 2022-11-03 DIAGNOSIS — R10.13 DYSPEPSIA: ICD-10-CM

## 2022-11-03 DIAGNOSIS — R12 HEARTBURN: ICD-10-CM

## 2022-11-03 DIAGNOSIS — E66.01 MORBID OBESITY WITH BODY MASS INDEX (BMI) OF 50.0 TO 59.9 IN ADULT: ICD-10-CM

## 2022-11-03 LAB
ALBUMIN SERPL-MCNC: 4 G/DL (ref 3.5–5.2)
ALBUMIN/GLOB SERPL: 1.3 G/DL
ALP SERPL-CCNC: 63 U/L (ref 39–117)
ALT SERPL W P-5'-P-CCNC: 42 U/L (ref 1–41)
ANION GAP SERPL CALCULATED.3IONS-SCNC: 12 MMOL/L (ref 5–15)
AST SERPL-CCNC: 21 U/L (ref 1–40)
BASOPHILS # BLD AUTO: 0.02 10*3/MM3 (ref 0–0.2)
BASOPHILS NFR BLD AUTO: 0.6 % (ref 0–1.5)
BILIRUB SERPL-MCNC: 0.3 MG/DL (ref 0–1.2)
BUN SERPL-MCNC: 19 MG/DL (ref 6–20)
BUN/CREAT SERPL: 18.8 (ref 7–25)
CALCIUM SPEC-SCNC: 9.7 MG/DL (ref 8.6–10.5)
CHLORIDE SERPL-SCNC: 103 MMOL/L (ref 98–107)
CO2 SERPL-SCNC: 23 MMOL/L (ref 22–29)
CREAT SERPL-MCNC: 1.01 MG/DL (ref 0.76–1.27)
DEPRECATED RDW RBC AUTO: 45 FL (ref 37–54)
EGFRCR SERPLBLD CKD-EPI 2021: 88.4 ML/MIN/1.73
EOSINOPHIL # BLD AUTO: 0.37 10*3/MM3 (ref 0–0.4)
EOSINOPHIL NFR BLD AUTO: 10.7 % (ref 0.3–6.2)
ERYTHROCYTE [DISTWIDTH] IN BLOOD BY AUTOMATED COUNT: 15.4 % (ref 12.3–15.4)
GLOBULIN UR ELPH-MCNC: 3.1 GM/DL
GLUCOSE SERPL-MCNC: 189 MG/DL (ref 65–99)
HCT VFR BLD AUTO: 38.9 % (ref 37.5–51)
HGB BLD-MCNC: 12.1 G/DL (ref 13–17.7)
IMM GRANULOCYTES # BLD AUTO: 0.01 10*3/MM3 (ref 0–0.05)
IMM GRANULOCYTES NFR BLD AUTO: 0.3 % (ref 0–0.5)
LYMPHOCYTES # BLD AUTO: 1.45 10*3/MM3 (ref 0.7–3.1)
LYMPHOCYTES NFR BLD AUTO: 41.8 % (ref 19.6–45.3)
MAGNESIUM SERPL-MCNC: 1.7 MG/DL (ref 1.6–2.6)
MCH RBC QN AUTO: 24.9 PG (ref 26.6–33)
MCHC RBC AUTO-ENTMCNC: 31.1 G/DL (ref 31.5–35.7)
MCV RBC AUTO: 80.2 FL (ref 79–97)
MONOCYTES # BLD AUTO: 0.37 10*3/MM3 (ref 0.1–0.9)
MONOCYTES NFR BLD AUTO: 10.7 % (ref 5–12)
NEUTROPHILS NFR BLD AUTO: 1.25 10*3/MM3 (ref 1.7–7)
NEUTROPHILS NFR BLD AUTO: 35.9 % (ref 42.7–76)
PHOSPHATE SERPL-MCNC: 3.3 MG/DL (ref 2.5–4.5)
PLATELET # BLD AUTO: 205 10*3/MM3 (ref 140–450)
PMV BLD AUTO: 9.6 FL (ref 6–12)
POTASSIUM SERPL-SCNC: 4.7 MMOL/L (ref 3.5–5.2)
PREALB SERPL-MCNC: 23.6 MG/DL (ref 20–40)
PROT SERPL-MCNC: 7.1 G/DL (ref 6–8.5)
RBC # BLD AUTO: 4.85 10*6/MM3 (ref 4.14–5.8)
SODIUM SERPL-SCNC: 138 MMOL/L (ref 136–145)
WBC NRBC COR # BLD: 3.47 10*3/MM3 (ref 3.4–10.8)

## 2022-11-03 PROCEDURE — 0 DIATRIZOATE MEGLUMINE & SODIUM PER 1 ML: Performed by: PHYSICIAN ASSISTANT

## 2022-11-03 PROCEDURE — 36415 COLL VENOUS BLD VENIPUNCTURE: CPT

## 2022-11-03 PROCEDURE — 84100 ASSAY OF PHOSPHORUS: CPT | Performed by: PHYSICIAN ASSISTANT

## 2022-11-03 PROCEDURE — 84134 ASSAY OF PREALBUMIN: CPT | Performed by: PHYSICIAN ASSISTANT

## 2022-11-03 PROCEDURE — 83735 ASSAY OF MAGNESIUM: CPT | Performed by: PHYSICIAN ASSISTANT

## 2022-11-03 PROCEDURE — 85025 COMPLETE CBC W/AUTO DIFF WBC: CPT | Performed by: PHYSICIAN ASSISTANT

## 2022-11-03 PROCEDURE — 80053 COMPREHEN METABOLIC PANEL: CPT | Performed by: PHYSICIAN ASSISTANT

## 2022-11-03 PROCEDURE — 74240 X-RAY XM UPR GI TRC 1CNTRST: CPT

## 2022-11-03 PROCEDURE — G0463 HOSPITAL OUTPT CLINIC VISIT: HCPCS

## 2022-11-03 RX ORDER — SODIUM CHLORIDE 0.9 % (FLUSH) 0.9 %
20 SYRINGE (ML) INJECTION AS NEEDED
Status: DISCONTINUED | OUTPATIENT
Start: 2022-11-03 | End: 2022-11-04 | Stop reason: HOSPADM

## 2022-11-03 RX ORDER — SODIUM CHLORIDE 0.9 % (FLUSH) 0.9 %
20 SYRINGE (ML) INJECTION AS NEEDED
Status: CANCELLED | OUTPATIENT
Start: 2022-11-10

## 2022-11-07 ENCOUNTER — OFFICE VISIT (OUTPATIENT)
Dept: BARIATRICS/WEIGHT MGMT | Facility: CLINIC | Age: 57
End: 2022-11-07

## 2022-11-07 VITALS
HEART RATE: 96 BPM | DIASTOLIC BLOOD PRESSURE: 88 MMHG | HEIGHT: 69 IN | RESPIRATION RATE: 18 BRPM | OXYGEN SATURATION: 98 % | WEIGHT: 315 LBS | BODY MASS INDEX: 46.65 KG/M2 | TEMPERATURE: 98 F | SYSTOLIC BLOOD PRESSURE: 140 MMHG

## 2022-11-07 DIAGNOSIS — Z98.84 S/P BARIATRIC SURGERY: Primary | ICD-10-CM

## 2022-11-07 DIAGNOSIS — R10.13 DYSPEPSIA: ICD-10-CM

## 2022-11-07 DIAGNOSIS — E55.9 VITAMIN D DEFICIENCY: ICD-10-CM

## 2022-11-07 DIAGNOSIS — E46 MALNUTRITION, UNSPECIFIED TYPE: ICD-10-CM

## 2022-11-07 DIAGNOSIS — R53.83 FATIGUE, UNSPECIFIED TYPE: ICD-10-CM

## 2022-11-07 DIAGNOSIS — R79.0 ABNORMAL BLOOD LEVEL OF IRON: ICD-10-CM

## 2022-11-07 PROCEDURE — 99024 POSTOP FOLLOW-UP VISIT: CPT | Performed by: PHYSICIAN ASSISTANT

## 2022-11-07 NOTE — PROGRESS NOTES
"[facility]  [address]  [address]  [facility] [id number] [phone]      Patient Name:  Von Rodríguez  :  1965      Date of Visit: 2022      Reason for Visit:  1 month postop      HPI:  Von Rodríguez is a 56 y.o. male s/p lap converted to open VSG (for bleeding) 10/5/22 [facility]      Feeling \"much better, significantly improved\" in the last 3-5 days.  Continues on TPN x10 hrs/day.      Getting 100g prot/day w/ yogurt and protein shakes, but now also tolerating stage 5 diet.  Denies dysphagia/reflux/N/V/AP.  Taking BID PPI + QID Carafate.  Bowels moving w/out issue.  Denies fevers.     Wearing MVI, B12, Vit D and iron patches.  Labs 11/3/22 - prealbumin 23.       Water Soluble UGI 11/3/22 @[facility] - IMPRESSION:  There was no evidence of extraluminal contrast. There was no delay in gastric  emptying. There is mild mucosal irregularity, with moderate luminal  narrowing of the proximal portion of the vertical sleeve, which may  represent postoperative edema, and or mild gastritis. Further evaluation  may be considered if clinically relevant.      Presurgery weight: 381 pounds.  Today's weight is (!) 163 kg (358 lb 8 oz) pounds, today's  Body mass index is 53.72 kg/m²., and weight loss since surgery is 23 pounds.       Past Medical History:   Diagnosis Date   • Abnormal PFT     mild obstruction   • Asthma     childhood; does not have inhaler   • Back pain     chronic; no meds; no steroids   • Benign paroxysmal positional vertigo    • Candidal intertrigo    • Cornea transplant recipient     left only   • Elevated alanine aminotransferase (ALT) level    • Helicobacter pylori ab+     took prevpak  - needs f/up testing   • Hiatal hernia with gastroesophageal reflux     EGD  Debra Tucker MD   • Hypertension    • Incisional hernia     s/p attempted lap RNY RUQ incision   • Microcytic erythrocytes    • JAD (obstructive sleep apnea)     s/p uvulaplasty   • Other " hyperlipidemia    • S/P cholecystectomy 1993    lap; gallstones   • Type 2 diabetes mellitus without complication, with long-term current use of insulin (HCC)     dx in 2018; on insulin since diagnosis; last A1c 6.2   • Wears glasses      Past Surgical History:   Procedure Laterality Date   • COLONOSCOPY     • CORNEAL TRANSPLANT Left    • DIAGNOSTIC LAPAROSCOPY      aborted RNY gastric bypass due to high trocar torque. Dr. Gregory Miller. Complicated by incisional hernia   • ENDOSCOPY N/A 10/05/2022    Procedure: ESOPHAGOGASTRODUODENOSCOPY;  Surgeon: Pete Connelly MD;  Location:  [facility];  Service: Bariatric;  Laterality: N/A;   • EXPLORATORY LAPAROTOMY N/A 10/05/2022    Procedure: LAPAROTOMY EXPLORATORY;  Surgeon: Pete Connelly MD;  Location:  [facility];  Service: Bariatric;  Laterality: N/A;   • GASTRIC SLEEVE LAPAROSCOPIC N/A 10/05/2022    Procedure: GASTRIC SLEEVE LAPAROSCOPIC;  Surgeon: Pete Connelly MD;  Location:  [facility];  Service: Bariatric;  Laterality: N/A;   • LAPAROSCOPIC CHOLECYSTECTOMY  1993    gallstones   • TONSILLECTOMY AND ADENOIDECTOMY  2007   • UVULOPLASTY  2007    w/ tonsillectomy (for JAD)   • WISDOM TOOTH EXTRACTION      all removed     Outpatient Medications Marked as Taking for the 11/7/22 encounter (Office Visit) with Chani Sinclair PA   Medication Sig Dispense Refill   • amLODIPine (NORVASC) 10 MG tablet Take 1 tablet by mouth Daily. 30 tablet 1   • carvedilol (COREG) 12.5 MG tablet Take 1 tablet by mouth Every 12 (Twelve) Hours. 60 tablet 1   • Continuous Blood Gluc Sensor (FreeStyle Matt 14 Day Sensor) misc      • EPINEPHrine (EPIPEN) 0.3 MG/0.3ML solution auto-injector injection      • HumaLOG KwikPen 200 UNIT/ML solution pen-injector      • Insulin Glargine (Lantus SoloStar) 100 UNIT/ML injection pen Every 12 (Twelve) Hours.     • losartan (Cozaar) 100 MG tablet Take 1 tablet by mouth Daily. 30 tablet 0   • omeprazole (priLOSEC) 40 MG capsule Take 1  "capsule by mouth 2 (Two) Times a Day for 90 days. 180 capsule 0   • rosuvastatin (CRESTOR) 10 MG tablet 20 mg.     • simethicone (MYLICON) 80 MG chewable tablet Chew 1 tablet 4 (Four) Times a Day. 120 tablet 1   • sucralfate (Carafate) 1 GM/10ML suspension Take 10 mL by mouth 4 (Four) Times a Day for 30 days. 1200 mL 0   • thiamine (VITAMIN B1) 100 MG tablet Take 1 tablet by mouth Daily. 30 tablet 1   • TPN for discharge See most recent TPN order. 1 mL 14   • VITAMIN D PO Take  by mouth.       Allergies   Allergen Reactions   • Codeine Rash       Social History     Socioeconomic History   • Marital status:    Tobacco Use   • Smoking status: Never   • Smokeless tobacco: Never   Vaping Use   • Vaping Use: Never used   Substance and Sexual Activity   • Alcohol use: Not Currently   • Drug use: Never     Social History     Social History Narrative    Lives in [place], [place].  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  .        /88 (BP Location: Left arm, Patient Position: Sitting)   Pulse 96   Temp 98 °F (36.7 °C)   Resp 18   Ht 174 cm (68.5\")   Wt (!) 163 kg (358 lb 8 oz)   SpO2 98%   BMI 53.72 kg/m²   Physical Exam  Constitutional:       Appearance: He is well-developed.   Cardiovascular:      Rate and Rhythm: Normal rate.   Pulmonary:      Effort: Pulmonary effort is normal.   Abdominal:      General: Bowel sounds are normal. There is no distension.      Palpations: Abdomen is soft.      Tenderness: There is no abdominal tenderness.      Hernia: No hernia is present.      Comments: incisions well healed   Musculoskeletal:         General: Normal range of motion.      Comments: (R)UE PICC - C/D/I - removed w/out issue   Skin:     General: Skin is warm and dry.   Neurological:      Mental Status: He is alert.           Assessment:   1 month s/p lap converted to open VSG (for bleeding) 10/5/22 [facility]        Plan:  Discussed w/ Dr. Connelly.  DC TPN.  PICC removed. Continue protein " 100g/day.    Routine labs ordered.  Continue vitamins w/ adjustments pending lab results.     Continue PPI + Carafate.  Continue to avoid ASA/NSAIDs/tobacco x 6 weeks postop, steroids x 8 weeks postop.  Call w/ problems/concerns.    The patient was instructed to follow up 2 months, sooner if needed.

## 2022-11-11 LAB
25(OH)D3+25(OH)D2 SERPL-MCNC: 45.1 NG/ML (ref 30–100)
ALBUMIN SERPL-MCNC: 4.1 G/DL (ref 3.8–4.9)
ALBUMIN/GLOB SERPL: 1.4 {RATIO} (ref 1.2–2.2)
ALP SERPL-CCNC: 67 IU/L (ref 44–121)
ALT SERPL-CCNC: 55 IU/L (ref 0–44)
AST SERPL-CCNC: 36 IU/L (ref 0–40)
BASOPHILS # BLD AUTO: 0 X10E3/UL (ref 0–0.2)
BASOPHILS NFR BLD AUTO: 1 %
BILIRUB SERPL-MCNC: 0.4 MG/DL (ref 0–1.2)
BUN SERPL-MCNC: 13 MG/DL (ref 6–24)
BUN/CREAT SERPL: 13 (ref 9–20)
CALCIUM SERPL-MCNC: 9.9 MG/DL (ref 8.7–10.2)
CHLORIDE SERPL-SCNC: 104 MMOL/L (ref 96–106)
CO2 SERPL-SCNC: 23 MMOL/L (ref 20–29)
CREAT SERPL-MCNC: 1.01 MG/DL (ref 0.76–1.27)
EGFRCR SERPLBLD CKD-EPI 2021: 88 ML/MIN/1.73
EOSINOPHIL # BLD AUTO: 0.2 X10E3/UL (ref 0–0.4)
EOSINOPHIL NFR BLD AUTO: 6 %
ERYTHROCYTE [DISTWIDTH] IN BLOOD BY AUTOMATED COUNT: 14.9 % (ref 11.6–15.4)
FERRITIN SERPL-MCNC: 170 NG/ML (ref 30–400)
FOLATE SERPL-MCNC: 15 NG/ML
GLOBULIN SER CALC-MCNC: 3 G/DL (ref 1.5–4.5)
GLUCOSE SERPL-MCNC: 179 MG/DL (ref 70–99)
HCT VFR BLD AUTO: 42.8 % (ref 37.5–51)
HGB BLD-MCNC: 13 G/DL (ref 13–17.7)
IMM GRANULOCYTES # BLD AUTO: 0 X10E3/UL (ref 0–0.1)
IMM GRANULOCYTES NFR BLD AUTO: 0 %
IRON SERPL-MCNC: 44 UG/DL (ref 38–169)
LYMPHOCYTES # BLD AUTO: 1.9 X10E3/UL (ref 0.7–3.1)
LYMPHOCYTES NFR BLD AUTO: 50 %
MCH RBC QN AUTO: 24.5 PG (ref 26.6–33)
MCHC RBC AUTO-ENTMCNC: 30.4 G/DL (ref 31.5–35.7)
MCV RBC AUTO: 81 FL (ref 79–97)
METHYLMALONATE SERPL-SCNC: 170 NMOL/L (ref 0–378)
MONOCYTES # BLD AUTO: 0.3 X10E3/UL (ref 0.1–0.9)
MONOCYTES NFR BLD AUTO: 8 %
NEUTROPHILS # BLD AUTO: 1.4 X10E3/UL (ref 1.4–7)
NEUTROPHILS NFR BLD AUTO: 35 %
PLATELET # BLD AUTO: 245 X10E3/UL (ref 150–450)
POTASSIUM SERPL-SCNC: 4.6 MMOL/L (ref 3.5–5.2)
PREALB SERPL-MCNC: 25 MG/DL (ref 10–36)
PROT SERPL-MCNC: 7.1 G/DL (ref 6–8.5)
RBC # BLD AUTO: 5.3 X10E6/UL (ref 4.14–5.8)
SODIUM SERPL-SCNC: 141 MMOL/L (ref 134–144)
VIT B1 BLD-SCNC: 288.9 NMOL/L (ref 66.5–200)
WBC # BLD AUTO: 3.9 X10E3/UL (ref 3.4–10.8)

## (undated) DEVICE — ENDOPATH XCEL UNIVERSAL TROCAR STABLILITY SLEEVES: Brand: ENDOPATH XCEL

## (undated) DEVICE — GLV SURG SENSICARE PI MIC PF SZ9 LF STRL

## (undated) DEVICE — TROCARS: Brand: KII® OPTICAL ACCESS SYSTEM

## (undated) DEVICE — DRSNG WND BORDR/ADHS NONADHR/GZ LF 2X2IN STRL

## (undated) DEVICE — INTENDED USE FOR SURGICAL MARKING ON INTACT SKIN, ALSO PROVIDES A PERMANENT METHOD OF IDENTIFYING OBJECTS IN THE OPERATING ROOM: Brand: WRITESITE® REGULAR TIP SKIN MARKER

## (undated) DEVICE — PROXIMATE RH ROTATING HEAD SKIN STAPLERS (35 WIDE) CONTAINS 35 STAINLESS STEEL STAPLES: Brand: PROXIMATE

## (undated) DEVICE — PENROSE DRAIN 18 X .5" SILICONE: Brand: MEDLINE

## (undated) DEVICE — ENDOPATH XCEL BLADELESS TROCARS WITH STABILITY SLEEVES: Brand: ENDOPATH XCEL

## (undated) DEVICE — POWER SHELL: Brand: SIGNIA

## (undated) DEVICE — JP PERF DRN SIL FLT 10MM FULL: Brand: CARDINAL HEALTH

## (undated) DEVICE — GOWN,NON-REINFORCED,SIRUS,SET IN SLV,XXL: Brand: MEDLINE

## (undated) DEVICE — Device: Brand: STANDARD BOUGIE, 38FR

## (undated) DEVICE — SUT PDS LP 1 TP1 96IN VIO PDP880GA

## (undated) DEVICE — ENSEAL LAPAROSCOPIC TISSUE SEALER G2 ARTICULATING CURVED JAW FOR USE WITH G2 GENERATOR 5MM DIAMETER 45CM SHAFT LENGTH: Brand: ENSEAL

## (undated) DEVICE — GLV SURG SENSICARE PI MIC PF SZ8.5 LF STRL

## (undated) DEVICE — APPL CHLORAPREP TINTED 26ML TEAL

## (undated) DEVICE — SHT AIR TRANSFR COMFRT GLIDE LAT 40X80IN

## (undated) DEVICE — JACKSON-PRATT 100CC BULB RESERVOIR: Brand: CARDINAL HEALTH

## (undated) DEVICE — CONN TBG Y 5 IN 1 LF STRL

## (undated) DEVICE — IMPLANTABLE DEVICE
Type: IMPLANTABLE DEVICE | Site: STOMACH | Status: NON-FUNCTIONAL
Brand: TITAN SGS STANDARD GASTRIC STAPLER

## (undated) DEVICE — Device: Brand: DEFENDO AIR/WATER/SUCTION AND BIOPSY VALVE

## (undated) DEVICE — SYR LUERLOK 30CC

## (undated) DEVICE — SUT ETHLN 2/0 PS 18IN 585H

## (undated) DEVICE — UNDRPD COMFRT GLD DRYPAD 36X57IN

## (undated) DEVICE — COVER,TABLE,HEAVY DUTY,50"X90",STRL: Brand: MEDLINE

## (undated) DEVICE — SPNG LAP PREWSH SFTPK 18X18IN STRL PK/5

## (undated) DEVICE — ENDOPATH 5MM ENDOSCOPIC BLUNT TIP DISSECTORS (12 POUCHES CONTAINING 3 DISSECTORS EACH): Brand: ENDOPATH

## (undated) DEVICE — COVADERM PLUS: Brand: DEROYAL

## (undated) DEVICE — DRSNG WND BORDR/ADHS NONADHR/GZ LF 4X4IN STRL

## (undated) DEVICE — LAPAROVUE VISIBILITY SYSTEM LAPAROSCOPIC SOLUTIONS: Brand: LAPAROVUE

## (undated) DEVICE — ANTIBACTERIAL VIOLET BRAIDED (POLYGLACTIN 910), SYNTHETIC ABSORBABLE SUTURE: Brand: COATED VICRYL

## (undated) DEVICE — ELECTRD BLD EZ CLN STD 6.5IN

## (undated) DEVICE — TBG PENCL TELESCP MEGADYNE SMOKE EVAC 10FT

## (undated) DEVICE — PK BARIATRIC 10

## (undated) DEVICE — CONTN GRAD MEAS TRIANG 32OZ BLK

## (undated) DEVICE — PATIENT RETURN ELECTRODE, SINGLE-USE, CONTACT QUALITY MONITORING, ADULT, WITH 9FT CORD, FOR PATIENTS WEIGING OVER 33LBS. (15KG): Brand: MEGADYNE

## (undated) DEVICE — MEDI-VAC YANKAUER SUCTION HANDLE W/BULBOUS TIP: Brand: CARDINAL HEALTH

## (undated) DEVICE — 3M™ MEDIPORE™ H SOFT CLOTH SURGICAL TAPE, 2863, 3 IN X 10 YD, 12/CASE: Brand: 3M™ MEDIPORE™

## (undated) DEVICE — BLANKT WARM UPPR/BDY ARM/OUT 57X196CM

## (undated) DEVICE — POOLE SUCTION INSTRUMENT WITH REMOVABLE SHEATH: Brand: POOLE

## (undated) DEVICE — ELECTRD BLD EZ CLN STD 2.5IN

## (undated) DEVICE — REINFORCED INTELLIGENT RELOAD, FOR USE WITH SIGNIA STAPLING SYSTEM
Type: IMPLANTABLE DEVICE | Site: STOMACH | Status: NON-FUNCTIONAL
Brand: TRI-STAPLE 2.0

## (undated) DEVICE — [HIGH FLOW INSUFFLATOR,  DO NOT USE IF PACKAGE IS DAMAGED,  KEEP DRY,  KEEP AWAY FROM SUNLIGHT,  PROTECT FROM HEAT AND RADIOACTIVE SOURCES.]: Brand: PNEUMOSURE

## (undated) DEVICE — APL DUPLOSPRAYER MIS 40CM

## (undated) DEVICE — LAPAROSCOPIC SMOKE FILTRATION SYSTEM: Brand: PALL LAPAROSHIELD® PLUS LAPAROSCOPIC SMOKE FILTRATION SYSTEM

## (undated) DEVICE — TUBING, SUCTION, 1/4" X 10', STRAIGHT: Brand: MEDLINE

## (undated) DEVICE — BLACK REINFORCED INTELLIGENT RELOAD, FOR USE WITH SIGNIA STAPLING SYSTEM
Type: IMPLANTABLE DEVICE | Site: STOMACH | Status: NON-FUNCTIONAL
Brand: TRI-STAPLE 2.0

## (undated) DEVICE — TROC STANDARDTROCAR FOR/TITANSGS 19MM DISP STRL

## (undated) DEVICE — CONMED REFLEX RHS SINGLE USE, RELOADABLE, ROTATING HEAD SKIN STAPLER: Brand: CONMED REFLEX

## (undated) DEVICE — SUT VIC 1 CTX 36IN OBGYN VCP977H

## (undated) DEVICE — IRRIGATOR BULB ASEPTO 60CC STRL

## (undated) DEVICE — TROCAR: Brand: KII FIOS FIRST ENTRY